# Patient Record
Sex: FEMALE | Race: WHITE | Employment: OTHER | URBAN - METROPOLITAN AREA
[De-identification: names, ages, dates, MRNs, and addresses within clinical notes are randomized per-mention and may not be internally consistent; named-entity substitution may affect disease eponyms.]

---

## 2017-01-06 DIAGNOSIS — E55.9 VITAMIN D DEFICIENCY: ICD-10-CM

## 2017-01-06 DIAGNOSIS — E78.00 PURE HYPERCHOLESTEROLEMIA: ICD-10-CM

## 2017-01-06 LAB — LDL CHOLESTEROL DIRECT: 114 MG/DL

## 2017-01-10 ENCOUNTER — TELEPHONE (OUTPATIENT)
Dept: FAMILY MEDICINE CLINIC | Age: 67
End: 2017-01-10

## 2017-01-10 DIAGNOSIS — E55.9 VITAMIN D DEFICIENCY: Primary | ICD-10-CM

## 2017-01-10 DIAGNOSIS — E78.00 PURE HYPERCHOLESTEROLEMIA: ICD-10-CM

## 2017-01-12 ENCOUNTER — OFFICE VISIT (OUTPATIENT)
Dept: FAMILY MEDICINE CLINIC | Age: 67
End: 2017-01-12

## 2017-01-12 VITALS
HEART RATE: 60 BPM | BODY MASS INDEX: 33.72 KG/M2 | SYSTOLIC BLOOD PRESSURE: 114 MMHG | WEIGHT: 183.25 LBS | HEIGHT: 62 IN | DIASTOLIC BLOOD PRESSURE: 56 MMHG | RESPIRATION RATE: 16 BRPM

## 2017-01-12 DIAGNOSIS — Z12.11 SCREEN FOR COLON CANCER: ICD-10-CM

## 2017-01-12 DIAGNOSIS — M25.50 ARTHRALGIA, UNSPECIFIED JOINT: ICD-10-CM

## 2017-01-12 DIAGNOSIS — Z00.00 WELL ADULT EXAM: Primary | ICD-10-CM

## 2017-01-12 LAB
HEMOCCULT STL QL: NORMAL

## 2017-01-12 PROCEDURE — 99396 PREV VISIT EST AGE 40-64: CPT | Performed by: FAMILY MEDICINE

## 2017-01-12 PROCEDURE — 82270 OCCULT BLOOD FECES: CPT | Performed by: FAMILY MEDICINE

## 2017-01-12 RX ORDER — MELOXICAM 15 MG/1
15 TABLET ORAL DAILY
Qty: 30 TABLET | Refills: 11 | Status: SHIPPED | OUTPATIENT
Start: 2017-01-12 | End: 2017-07-12

## 2017-01-12 ASSESSMENT — ENCOUNTER SYMPTOMS
CONSTIPATION: 0
SHORTNESS OF BREATH: 0
SINUS PRESSURE: 0

## 2017-03-27 ENCOUNTER — TELEPHONE (OUTPATIENT)
Dept: FAMILY MEDICINE CLINIC | Age: 67
End: 2017-03-27

## 2017-06-14 RX ORDER — LISINOPRIL 20 MG/1
TABLET ORAL
Qty: 90 TABLET | Refills: 3 | Status: SHIPPED | OUTPATIENT
Start: 2017-06-14 | End: 2018-09-10 | Stop reason: SDUPTHER

## 2017-07-05 ENCOUNTER — TELEPHONE (OUTPATIENT)
Dept: FAMILY MEDICINE CLINIC | Age: 67
End: 2017-07-05

## 2017-07-08 LAB
CHOLESTEROL, TOTAL: NORMAL MG/DL
CHOLESTEROL/HDL RATIO: NORMAL
HDLC SERPL-MCNC: NORMAL MG/DL (ref 35–70)
LDL CHOLESTEROL CALCULATED: 104 MG/DL (ref 0–160)
TRIGL SERPL-MCNC: NORMAL MG/DL
VLDLC SERPL CALC-MCNC: NORMAL MG/DL

## 2017-07-10 DIAGNOSIS — E55.9 VITAMIN D DEFICIENCY: ICD-10-CM

## 2017-07-10 DIAGNOSIS — E78.00 PURE HYPERCHOLESTEROLEMIA: ICD-10-CM

## 2017-07-11 DIAGNOSIS — E78.00 PURE HYPERCHOLESTEROLEMIA: ICD-10-CM

## 2017-07-11 DIAGNOSIS — E55.9 VITAMIN D DEFICIENCY: Primary | ICD-10-CM

## 2017-07-12 ENCOUNTER — TELEPHONE (OUTPATIENT)
Dept: FAMILY MEDICINE CLINIC | Age: 67
End: 2017-07-12

## 2017-07-12 ENCOUNTER — OFFICE VISIT (OUTPATIENT)
Dept: FAMILY MEDICINE CLINIC | Age: 67
End: 2017-07-12

## 2017-07-12 VITALS
HEIGHT: 62 IN | RESPIRATION RATE: 14 BRPM | HEART RATE: 68 BPM | WEIGHT: 186.25 LBS | SYSTOLIC BLOOD PRESSURE: 106 MMHG | BODY MASS INDEX: 34.27 KG/M2 | DIASTOLIC BLOOD PRESSURE: 64 MMHG

## 2017-07-12 DIAGNOSIS — I10 ESSENTIAL HYPERTENSION: ICD-10-CM

## 2017-07-12 DIAGNOSIS — E78.00 PURE HYPERCHOLESTEROLEMIA: Primary | ICD-10-CM

## 2017-07-12 PROCEDURE — 99213 OFFICE O/P EST LOW 20 MIN: CPT | Performed by: FAMILY MEDICINE

## 2017-07-12 RX ORDER — SIMVASTATIN 20 MG
TABLET ORAL
Qty: 90 TABLET | Refills: 3 | Status: SHIPPED | OUTPATIENT
Start: 2017-07-12 | End: 2018-07-05 | Stop reason: SDUPTHER

## 2017-07-12 ASSESSMENT — ENCOUNTER SYMPTOMS
BACK PAIN: 1
CONSTIPATION: 0
SINUS PRESSURE: 0
SHORTNESS OF BREATH: 0

## 2018-01-26 DIAGNOSIS — E78.00 PURE HYPERCHOLESTEROLEMIA: ICD-10-CM

## 2018-01-26 DIAGNOSIS — E55.9 VITAMIN D DEFICIENCY: ICD-10-CM

## 2018-01-26 LAB — LDL CHOLESTEROL DIRECT: 93 MG/DL

## 2018-01-27 DIAGNOSIS — E78.00 PURE HYPERCHOLESTEROLEMIA: Primary | ICD-10-CM

## 2018-01-27 DIAGNOSIS — E55.9 VITAMIN D DEFICIENCY: ICD-10-CM

## 2018-01-29 ENCOUNTER — TELEPHONE (OUTPATIENT)
Dept: FAMILY MEDICINE CLINIC | Age: 68
End: 2018-01-29

## 2018-02-02 ENCOUNTER — OFFICE VISIT (OUTPATIENT)
Dept: FAMILY MEDICINE CLINIC | Age: 68
End: 2018-02-02

## 2018-02-02 VITALS
HEART RATE: 72 BPM | RESPIRATION RATE: 16 BRPM | WEIGHT: 187 LBS | DIASTOLIC BLOOD PRESSURE: 68 MMHG | HEIGHT: 62 IN | SYSTOLIC BLOOD PRESSURE: 130 MMHG | BODY MASS INDEX: 34.41 KG/M2

## 2018-02-02 DIAGNOSIS — M48.062 SPINAL STENOSIS OF LUMBAR REGION WITH NEUROGENIC CLAUDICATION: Primary | ICD-10-CM

## 2018-02-02 PROCEDURE — 99213 OFFICE O/P EST LOW 20 MIN: CPT | Performed by: FAMILY MEDICINE

## 2018-02-02 RX ORDER — FAMOTIDINE 10 MG
10 TABLET ORAL DAILY PRN
Status: ON HOLD | COMMUNITY
End: 2019-02-04 | Stop reason: ALTCHOICE

## 2018-02-02 ASSESSMENT — ENCOUNTER SYMPTOMS
SINUS PRESSURE: 0
BACK PAIN: 1
SHORTNESS OF BREATH: 0
CONSTIPATION: 0

## 2018-02-06 ENCOUNTER — HOSPITAL ENCOUNTER (OUTPATIENT)
Age: 68
Discharge: HOME | End: 2018-02-06
Payer: MEDICARE

## 2018-02-06 ENCOUNTER — HOSPITAL ENCOUNTER (OUTPATIENT)
Dept: GENERAL RADIOLOGY | Age: 68
Discharge: HOME | End: 2018-02-06
Payer: MEDICARE

## 2018-02-06 DIAGNOSIS — M48.062 SPINAL STENOSIS OF LUMBAR REGION WITH NEUROGENIC CLAUDICATION: ICD-10-CM

## 2018-02-06 PROCEDURE — 72110 X-RAY EXAM L-2 SPINE 4/>VWS: CPT

## 2018-02-19 ENCOUNTER — TELEPHONE (OUTPATIENT)
Dept: FAMILY MEDICINE CLINIC | Age: 68
End: 2018-02-19

## 2018-04-09 ENCOUNTER — TELEPHONE (OUTPATIENT)
Dept: FAMILY MEDICINE CLINIC | Age: 68
End: 2018-04-09

## 2018-04-09 DIAGNOSIS — M48.062 SPINAL STENOSIS OF LUMBAR REGION WITH NEUROGENIC CLAUDICATION: Primary | ICD-10-CM

## 2018-04-09 DIAGNOSIS — I10 ESSENTIAL HYPERTENSION: Primary | ICD-10-CM

## 2018-04-10 ENCOUNTER — HOSPITAL ENCOUNTER (OUTPATIENT)
Age: 68
Discharge: HOME | End: 2018-04-10
Payer: MEDICARE

## 2018-04-10 DIAGNOSIS — I10 ESSENTIAL HYPERTENSION: ICD-10-CM

## 2018-04-10 LAB
BUN BLDV-MCNC: 19 MG/DL (ref 7–22)
CREAT SERPL-MCNC: 0.8 MG/DL (ref 0.4–1.2)
GFR SERPL CREATININE-BSD FRML MDRD: 72 ML/MIN/1.73M2

## 2018-04-10 PROCEDURE — 36415 COLL VENOUS BLD VENIPUNCTURE: CPT

## 2018-04-10 PROCEDURE — 82565 ASSAY OF CREATININE: CPT

## 2018-04-10 PROCEDURE — 84520 ASSAY OF UREA NITROGEN: CPT

## 2018-04-13 ENCOUNTER — TELEPHONE (OUTPATIENT)
Dept: FAMILY MEDICINE CLINIC | Age: 68
End: 2018-04-13

## 2018-04-13 ENCOUNTER — HOSPITAL ENCOUNTER (OUTPATIENT)
Dept: MRI IMAGING | Age: 68
Discharge: HOME | End: 2018-04-13
Payer: MEDICARE

## 2018-04-13 DIAGNOSIS — F41.9 ANXIETY: Primary | ICD-10-CM

## 2018-04-13 DIAGNOSIS — M48.062 SPINAL STENOSIS OF LUMBAR REGION WITH NEUROGENIC CLAUDICATION: ICD-10-CM

## 2018-04-13 PROCEDURE — 72148 MRI LUMBAR SPINE W/O DYE: CPT

## 2018-04-13 RX ORDER — ALPRAZOLAM 1 MG/1
1 TABLET ORAL ONCE
Qty: 1 TABLET | Refills: 0 | Status: SHIPPED | OUTPATIENT
Start: 2018-04-13 | End: 2018-04-13

## 2018-04-17 ENCOUNTER — TELEPHONE (OUTPATIENT)
Dept: FAMILY MEDICINE CLINIC | Age: 68
End: 2018-04-17

## 2018-07-05 DIAGNOSIS — I10 ESSENTIAL HYPERTENSION: ICD-10-CM

## 2018-07-05 DIAGNOSIS — E78.00 PURE HYPERCHOLESTEROLEMIA: ICD-10-CM

## 2018-07-05 RX ORDER — SIMVASTATIN 20 MG
TABLET ORAL
Qty: 90 TABLET | Refills: 0 | Status: SHIPPED | OUTPATIENT
Start: 2018-07-05 | End: 2018-10-07 | Stop reason: SDUPTHER

## 2018-09-10 ENCOUNTER — TELEPHONE (OUTPATIENT)
Dept: FAMILY MEDICINE CLINIC | Age: 68
End: 2018-09-10

## 2018-09-10 DIAGNOSIS — I10 ESSENTIAL HYPERTENSION: ICD-10-CM

## 2018-09-10 DIAGNOSIS — E55.9 VITAMIN D DEFICIENCY: Primary | ICD-10-CM

## 2018-09-10 RX ORDER — LISINOPRIL 20 MG/1
TABLET ORAL
Qty: 90 TABLET | Refills: 0 | Status: SHIPPED | OUTPATIENT
Start: 2018-09-10 | End: 2018-10-09 | Stop reason: SDUPTHER

## 2018-09-10 NOTE — TELEPHONE ENCOUNTER
Pt called stating that she was going to get labs done but they have . She is asking for them to be reordered and faxed for her to get them done.      Fax to [facility] on [address] also needing a refill of:     lisinopril (PRINIVIL;ZESTRIL) 20 MG tablet    Send 90 day supply to [facility] on [address]

## 2018-09-12 DIAGNOSIS — I10 ESSENTIAL HYPERTENSION: ICD-10-CM

## 2018-09-12 DIAGNOSIS — E55.9 VITAMIN D DEFICIENCY: ICD-10-CM

## 2018-09-12 LAB
CHOLESTEROL, TOTAL: NORMAL MG/DL
CHOLESTEROL/HDL RATIO: NORMAL
HDLC SERPL-MCNC: NORMAL MG/DL (ref 35–70)
LDL CHOLESTEROL CALCULATED: 100 MG/DL (ref 0–160)
TRIGL SERPL-MCNC: NORMAL MG/DL
VLDLC SERPL CALC-MCNC: NORMAL MG/DL

## 2018-10-07 DIAGNOSIS — I10 ESSENTIAL HYPERTENSION: ICD-10-CM

## 2018-10-07 DIAGNOSIS — E78.00 PURE HYPERCHOLESTEROLEMIA: ICD-10-CM

## 2018-10-08 RX ORDER — SIMVASTATIN 20 MG
TABLET ORAL
Qty: 90 TABLET | Refills: 3 | Status: SHIPPED | OUTPATIENT
Start: 2018-10-08 | End: 2018-10-09 | Stop reason: SDUPTHER

## 2018-10-09 ENCOUNTER — OFFICE VISIT (OUTPATIENT)
Dept: FAMILY MEDICINE CLINIC | Age: 68
End: 2018-10-09

## 2018-10-09 VITALS
BODY MASS INDEX: 33.51 KG/M2 | DIASTOLIC BLOOD PRESSURE: 62 MMHG | WEIGHT: 182.13 LBS | HEART RATE: 64 BPM | SYSTOLIC BLOOD PRESSURE: 120 MMHG | HEIGHT: 62 IN | RESPIRATION RATE: 13 BRPM

## 2018-10-09 DIAGNOSIS — G89.29 CHRONIC MIDLINE LOW BACK PAIN WITHOUT SCIATICA: Primary | ICD-10-CM

## 2018-10-09 DIAGNOSIS — F33.41 RECURRENT MAJOR DEPRESSIVE DISORDER, IN PARTIAL REMISSION (HCC): ICD-10-CM

## 2018-10-09 DIAGNOSIS — E78.00 PURE HYPERCHOLESTEROLEMIA: ICD-10-CM

## 2018-10-09 DIAGNOSIS — E55.9 VITAMIN D DEFICIENCY: ICD-10-CM

## 2018-10-09 DIAGNOSIS — M54.50 CHRONIC MIDLINE LOW BACK PAIN WITHOUT SCIATICA: Primary | ICD-10-CM

## 2018-10-09 DIAGNOSIS — I10 ESSENTIAL HYPERTENSION: ICD-10-CM

## 2018-10-09 PROCEDURE — 99214 OFFICE O/P EST MOD 30 MIN: CPT | Performed by: FAMILY MEDICINE

## 2018-10-09 PROCEDURE — 1101F PT FALLS ASSESS-DOCD LE1/YR: CPT | Performed by: FAMILY MEDICINE

## 2018-10-09 RX ORDER — AMLODIPINE BESYLATE 2.5 MG/1
2.5 TABLET ORAL DAILY
Qty: 90 TABLET | Refills: 3 | Status: SHIPPED | OUTPATIENT
Start: 2018-10-09 | End: 2021-12-09 | Stop reason: SDUPTHER

## 2018-10-09 RX ORDER — LISINOPRIL 20 MG/1
TABLET ORAL
Qty: 90 TABLET | Refills: 3 | Status: SHIPPED | OUTPATIENT
Start: 2018-10-09 | End: 2019-10-28 | Stop reason: SDUPTHER

## 2018-10-09 RX ORDER — SIMVASTATIN 20 MG
TABLET ORAL
Qty: 90 TABLET | Refills: 3 | Status: SHIPPED | OUTPATIENT
Start: 2018-10-09 | End: 2019-12-13 | Stop reason: SDUPTHER

## 2018-10-09 ASSESSMENT — PATIENT HEALTH QUESTIONNAIRE - PHQ9
SUM OF ALL RESPONSES TO PHQ QUESTIONS 1-9: 0
SUM OF ALL RESPONSES TO PHQ QUESTIONS 1-9: 0
1. LITTLE INTEREST OR PLEASURE IN DOING THINGS: 0
2. FEELING DOWN, DEPRESSED OR HOPELESS: 0
SUM OF ALL RESPONSES TO PHQ9 QUESTIONS 1 & 2: 0

## 2018-10-09 ASSESSMENT — ENCOUNTER SYMPTOMS
SHORTNESS OF BREATH: 0
SINUS PRESSURE: 0
CONSTIPATION: 0

## 2018-11-27 ENCOUNTER — OFFICE VISIT (OUTPATIENT)
Dept: PHYSICAL MEDICINE AND REHAB | Age: 68
End: 2018-11-27
Payer: MEDICARE

## 2018-11-27 VITALS
SYSTOLIC BLOOD PRESSURE: 134 MMHG | HEIGHT: 62 IN | WEIGHT: 192.8 LBS | DIASTOLIC BLOOD PRESSURE: 82 MMHG | HEART RATE: 72 BPM | BODY MASS INDEX: 35.48 KG/M2

## 2018-11-27 DIAGNOSIS — G89.4 CHRONIC PAIN SYNDROME: ICD-10-CM

## 2018-11-27 DIAGNOSIS — M46.1 SI (SACROILIAC) JOINT INFLAMMATION (HCC): ICD-10-CM

## 2018-11-27 DIAGNOSIS — M47.816 SPONDYLOSIS OF LUMBAR REGION WITHOUT MYELOPATHY OR RADICULOPATHY: Primary | ICD-10-CM

## 2018-11-27 PROCEDURE — G8484 FLU IMMUNIZE NO ADMIN: HCPCS | Performed by: NURSE PRACTITIONER

## 2018-11-27 PROCEDURE — 1036F TOBACCO NON-USER: CPT | Performed by: NURSE PRACTITIONER

## 2018-11-27 PROCEDURE — 1101F PT FALLS ASSESS-DOCD LE1/YR: CPT | Performed by: NURSE PRACTITIONER

## 2018-11-27 PROCEDURE — G8427 DOCREV CUR MEDS BY ELIG CLIN: HCPCS | Performed by: NURSE PRACTITIONER

## 2018-11-27 PROCEDURE — 1123F ACP DISCUSS/DSCN MKR DOCD: CPT | Performed by: NURSE PRACTITIONER

## 2018-11-27 PROCEDURE — 99205 OFFICE O/P NEW HI 60 MIN: CPT | Performed by: NURSE PRACTITIONER

## 2018-11-27 PROCEDURE — 1090F PRES/ABSN URINE INCON ASSESS: CPT | Performed by: NURSE PRACTITIONER

## 2018-11-27 PROCEDURE — G8400 PT W/DXA NO RESULTS DOC: HCPCS | Performed by: NURSE PRACTITIONER

## 2018-11-27 PROCEDURE — G8417 CALC BMI ABV UP PARAM F/U: HCPCS | Performed by: NURSE PRACTITIONER

## 2018-11-27 PROCEDURE — 3017F COLORECTAL CA SCREEN DOC REV: CPT | Performed by: NURSE PRACTITIONER

## 2018-11-27 PROCEDURE — 4040F PNEUMOC VAC/ADMIN/RCVD: CPT | Performed by: NURSE PRACTITIONER

## 2018-11-27 RX ORDER — HYDROCODONE BITARTRATE AND ACETAMINOPHEN 5; 325 MG/1; MG/1
1 TABLET ORAL 2 TIMES DAILY PRN
Qty: 28 TABLET | Refills: 0 | Status: SHIPPED | OUTPATIENT
Start: 2018-11-27 | End: 2018-12-11

## 2018-11-27 ASSESSMENT — ENCOUNTER SYMPTOMS
SORE THROAT: 0
DIARRHEA: 0
EYE PAIN: 0
CONSTIPATION: 0
WHEEZING: 0
CHEST TIGHTNESS: 0
VOMITING: 0
COUGH: 0
COLOR CHANGE: 0
SHORTNESS OF BREATH: 0
NAUSEA: 0
SINUS PRESSURE: 0
PHOTOPHOBIA: 0
ABDOMINAL PAIN: 0
BACK PAIN: 1
RHINORRHEA: 0

## 2018-11-27 NOTE — PROGRESS NOTES
ChiefComplaint: Low back pain SI pain    HPI  New patient here today with c/o low back pain for the last 7 years. Denies any trauma fall or MVA. She worked at [employer] for 21 years and stands all shift. She has never and lumbar spine surgery. She has seen Dr Hieu Gomez. She has went to [facility] pain clinic in the past and has had Bilateral Lumbar RFA L4-5,5-S1 in 2014 with great relief for about 6-12 months. She also had SI  MBB and Caudal epidurals with some relief. Describes the low back pain as constant sharp aching pain increases to stabbing pains with standing, walking. The pain is 90% back, rarely she has a burning sensation into her thighs and tailbone. The pain radiates into her tailbone and SI also. She has morning stiffness and c/o muscle spasms. Rates pain at worse 9/10, best 3/10, average 6/10. Treatments tried Lumbar RFA in 2014, Caudal Epidural SI injection, PT, home therapy, ice, heat, Chiropractor, TENS Unit, binder, OTC Advil, Neurontin, NSAID,Tramdaol, Tylenol, OTC rubs patches, creams, compound cream.  She denies h/o cancer or long term steroid use. She denies any bowel or bladder dysfunction. Lumbar MRI reviewed 2018  FINDINGS:           There is 5 mm of anterolisthesis of L4 on L5. This is new. There are degenerative marrow changes in the endplates at the V2-6 level. There is associated edema on the right.  There are no compression fractures.  No pars defects are noted.  There is disc    desiccation throughout.       The distal spinal cord, conus medullaris and cauda equina are normal.        There are mild degenerative changes in the lower thoracic spine.       On the axial images, at L1-L2, there are mild facet degenerative changes. There is no spinal canal stenosis. There is mild bilateral foraminal stenosis.       At L2-L3, there are facet degenerative changes. There is thickening of the ligamentum flavum. There is a diffuse disc bulge. There is mild spinal canal stenosis.  There is moderate severity right foraminal stenosis. There is mild left foraminal stenosis.       At L3-L4, there are mild facet degenerative changes. There is thickening of ligamentum flavum. There is mild spinal canal stenosis. There is no significant foraminal stenosis.       At L4-L5, there are severe left-sided facet degenerative changes. There are moderate severity right-sided facet degenerative changes. There is a diffuse disc bulge. There is moderate severity spinal canal stenosis. There is moderate to severe left    foraminal stenosis.       At L5-S1, there is no spinal canal stenosis. There is mild left foraminal stenosis.       There are no suspicious findings in the visualized aspects of the retroperitoneum and paraspinal soft tissues.           Impression       1. Moderate to severe left foraminal stenosis with moderate severity spinal canal stenosis at the L4-5 level. There is also 5 mm of anterolisthesis at this level. 2. Mild spinal canal stenosis at the L3-4 level. 3. Mild spinal canal stenosis with moderate severity right and mild left foraminal stenosis at the L2-3 level. Lumbar XR reviewed 2/6/2018  1. Mild thoracolumbar dextro scoliosis. Mild lumbar levoscoliosis. Partial sacralization of L5 with bilateral pseudoarthroses. Marked lateral disc space narrowing L2-3 right side. 2. Mild antegrade spondylolisthesis L4 upon L5, 5 mm. Marked disc space narrowing and degenerative disc disease at this level. 3. No evidence for spondylolysis. No fractures seen. Mild degenerative spondylosis scattered throughout the lumbar spine. Moderate degenerative facet arthropathy L2-3 right side, L3-4 and L4-5 bilaterally. 4. Overall appearance of lumbar spine has worsened since prior study.                     The patient is allergic to motrin [ibuprofen] and pcn [penicillins]. Lv Mast  has a past medical history of Adenomatous colon polyp;  Chronic gastritis; GERD (gastroesophageal reflux 2016  · NSAIDs: Yes,  any benefit? Yes,  how long taken:months   · Chiropractic: Yes,  any benefit? No  · Muscle relaxants: Yes,  any benefit? Yes  · Narcotics: Yes,  any benefit? Yes  · Spine surgeon consult: Yes Dr Yara Castano  · Any Implants: No    Meds. Prescribed:   Orders Placed This Encounter   Medications    HYDROcodone-acetaminophen (NORCO) 5-325 MG per tablet     Sig: Take 1 tablet by mouth 2 times daily as needed for Pain for up to 14 days. .     Dispense:  28 tablet     Refill:  0       Return for Lumbar Facet [email or web address] Bilateral, Follow up after procedure, Follow up pain medications. Time spent with patient was 60  minutes more than 50% was spent  Counseling/coordinated the patient'scare.     Electronically signed by SHERRY Hagan CNP on 11/27/2018 at 8:55 AM

## 2018-12-13 ENCOUNTER — TELEPHONE (OUTPATIENT)
Dept: OPERATING ROOM | Age: 68
End: 2018-12-13

## 2018-12-27 ENCOUNTER — TELEPHONE (OUTPATIENT)
Dept: OPERATING ROOM | Age: 68
End: 2018-12-27

## 2018-12-31 DIAGNOSIS — G89.4 CHRONIC PAIN SYNDROME: Primary | ICD-10-CM

## 2018-12-31 RX ORDER — HYDROCODONE BITARTRATE AND ACETAMINOPHEN 5; 325 MG/1; MG/1
1 TABLET ORAL 2 TIMES DAILY
Qty: 60 TABLET | Refills: 0 | Status: SHIPPED | OUTPATIENT
Start: 2018-12-31 | End: 2019-01-30

## 2018-12-31 NOTE — TELEPHONE ENCOUNTER
OARRS reviewed. UDS: screen on 11/27/2018, no results (new patient). Last seen: 11/27/2018 as a new patient. Follow-up: procedure scheduled for 12/18/2018 was cancelled, scheduled for 01/28/2019 and was also cancelled. Please advise.

## 2019-01-17 ENCOUNTER — PREP FOR PROCEDURE (OUTPATIENT)
Dept: PHYSICAL MEDICINE AND REHAB | Age: 69
End: 2019-01-17

## 2019-02-04 ENCOUNTER — ANESTHESIA (OUTPATIENT)
Dept: OPERATING ROOM | Age: 69
End: 2019-02-04
Payer: MEDICARE

## 2019-02-04 ENCOUNTER — APPOINTMENT (OUTPATIENT)
Dept: GENERAL RADIOLOGY | Age: 69
End: 2019-02-04
Attending: PAIN MEDICINE
Payer: MEDICARE

## 2019-02-04 ENCOUNTER — ANESTHESIA EVENT (OUTPATIENT)
Dept: OPERATING ROOM | Age: 69
End: 2019-02-04
Payer: MEDICARE

## 2019-02-04 ENCOUNTER — HOSPITAL ENCOUNTER (OUTPATIENT)
Age: 69
Setting detail: OUTPATIENT SURGERY
Discharge: HOME | End: 2019-02-04
Attending: PAIN MEDICINE | Admitting: PAIN MEDICINE
Payer: MEDICARE

## 2019-02-04 VITALS
SYSTOLIC BLOOD PRESSURE: 98 MMHG | OXYGEN SATURATION: 99 % | DIASTOLIC BLOOD PRESSURE: 54 MMHG | RESPIRATION RATE: 16 BRPM

## 2019-02-04 VITALS
WEIGHT: 186.4 LBS | OXYGEN SATURATION: 95 % | RESPIRATION RATE: 16 BRPM | BODY MASS INDEX: 34.3 KG/M2 | TEMPERATURE: 97.5 F | SYSTOLIC BLOOD PRESSURE: 116 MMHG | HEART RATE: 79 BPM | DIASTOLIC BLOOD PRESSURE: 71 MMHG | HEIGHT: 62 IN

## 2019-02-04 PROCEDURE — 7100000011 HC PHASE II RECOVERY - ADDTL 15 MIN: Performed by: PAIN MEDICINE

## 2019-02-04 PROCEDURE — 3700000000 HC ANESTHESIA ATTENDED CARE: Performed by: PAIN MEDICINE

## 2019-02-04 PROCEDURE — 2500000003 HC RX 250 WO HCPCS: Performed by: NURSE ANESTHETIST, CERTIFIED REGISTERED

## 2019-02-04 PROCEDURE — 64494 INJ PARAVERT F JNT L/S 2 LEV: CPT | Performed by: PAIN MEDICINE

## 2019-02-04 PROCEDURE — 6360000002 HC RX W HCPCS: Performed by: PAIN MEDICINE

## 2019-02-04 PROCEDURE — 3600000054 HC PAIN LEVEL 3 BASE: Performed by: PAIN MEDICINE

## 2019-02-04 PROCEDURE — 3209999900 FLUORO FOR SURGICAL PROCEDURES

## 2019-02-04 PROCEDURE — 6360000002 HC RX W HCPCS: Performed by: NURSE ANESTHETIST, CERTIFIED REGISTERED

## 2019-02-04 PROCEDURE — 2500000003 HC RX 250 WO HCPCS: Performed by: PAIN MEDICINE

## 2019-02-04 PROCEDURE — 64493 INJ PARAVERT F JNT L/S 1 LEV: CPT | Performed by: PAIN MEDICINE

## 2019-02-04 PROCEDURE — 64495 INJ PARAVERT F JNT L/S 3 LEV: CPT | Performed by: PAIN MEDICINE

## 2019-02-04 PROCEDURE — 2709999900 HC NON-CHARGEABLE SUPPLY: Performed by: PAIN MEDICINE

## 2019-02-04 PROCEDURE — 7100000010 HC PHASE II RECOVERY - FIRST 15 MIN: Performed by: PAIN MEDICINE

## 2019-02-04 RX ORDER — LIDOCAINE HYDROCHLORIDE 10 MG/ML
INJECTION, SOLUTION INFILTRATION; PERINEURAL PRN
Status: DISCONTINUED | OUTPATIENT
Start: 2019-02-04 | End: 2019-02-04 | Stop reason: SDUPTHER

## 2019-02-04 RX ORDER — PROPOFOL 10 MG/ML
INJECTION, EMULSION INTRAVENOUS PRN
Status: DISCONTINUED | OUTPATIENT
Start: 2019-02-04 | End: 2019-02-04 | Stop reason: SDUPTHER

## 2019-02-04 RX ORDER — ROPIVACAINE HYDROCHLORIDE 2 MG/ML
INJECTION, SOLUTION EPIDURAL; INFILTRATION; PERINEURAL PRN
Status: DISCONTINUED | OUTPATIENT
Start: 2019-02-04 | End: 2019-02-04 | Stop reason: HOSPADM

## 2019-02-04 RX ORDER — BETAMETHASONE SODIUM PHOSPHATE AND BETAMETHASONE ACETATE 3; 3 MG/ML; MG/ML
INJECTION, SUSPENSION INTRA-ARTICULAR; INTRALESIONAL; INTRAMUSCULAR; SOFT TISSUE PRN
Status: DISCONTINUED | OUTPATIENT
Start: 2019-02-04 | End: 2019-02-04 | Stop reason: HOSPADM

## 2019-02-04 RX ORDER — PANTOPRAZOLE SODIUM 40 MG/1
40 GRANULE, DELAYED RELEASE ORAL
COMMUNITY
End: 2019-08-27

## 2019-02-04 RX ORDER — LIDOCAINE HYDROCHLORIDE 10 MG/ML
INJECTION, SOLUTION INFILTRATION; PERINEURAL PRN
Status: DISCONTINUED | OUTPATIENT
Start: 2019-02-04 | End: 2019-02-04 | Stop reason: HOSPADM

## 2019-02-04 RX ADMIN — PROPOFOL 50 MG: 10 INJECTION, EMULSION INTRAVENOUS at 10:29

## 2019-02-04 RX ADMIN — LIDOCAINE HYDROCHLORIDE 20 MG: 10 INJECTION, SOLUTION INFILTRATION; PERINEURAL at 10:29

## 2019-02-04 RX ADMIN — PROPOFOL 50 MG: 10 INJECTION, EMULSION INTRAVENOUS at 10:31

## 2019-02-04 ASSESSMENT — PULMONARY FUNCTION TESTS
PIF_VALUE: 0

## 2019-02-04 ASSESSMENT — PAIN - FUNCTIONAL ASSESSMENT: PAIN_FUNCTIONAL_ASSESSMENT: 0-10

## 2019-02-04 ASSESSMENT — PAIN DESCRIPTION - DESCRIPTORS: DESCRIPTORS: SHARP;CONSTANT;SORE

## 2019-02-04 ASSESSMENT — PAIN SCALES - GENERAL: PAINLEVEL_OUTOF10: 0

## 2019-02-08 ENCOUNTER — OFFICE VISIT (OUTPATIENT)
Dept: PHYSICAL MEDICINE AND REHAB | Age: 69
End: 2019-02-08
Payer: MEDICARE

## 2019-02-08 VITALS
HEIGHT: 62 IN | BODY MASS INDEX: 34.63 KG/M2 | SYSTOLIC BLOOD PRESSURE: 138 MMHG | DIASTOLIC BLOOD PRESSURE: 73 MMHG | HEART RATE: 70 BPM | WEIGHT: 188.2 LBS

## 2019-02-08 DIAGNOSIS — M47.816 SPONDYLOSIS OF LUMBAR REGION WITHOUT MYELOPATHY OR RADICULOPATHY: Primary | ICD-10-CM

## 2019-02-08 DIAGNOSIS — M46.1 SI (SACROILIAC) JOINT INFLAMMATION (HCC): ICD-10-CM

## 2019-02-08 DIAGNOSIS — G89.4 CHRONIC PAIN SYNDROME: ICD-10-CM

## 2019-02-08 PROCEDURE — 99213 OFFICE O/P EST LOW 20 MIN: CPT | Performed by: NURSE PRACTITIONER

## 2019-02-08 ASSESSMENT — ENCOUNTER SYMPTOMS
COLOR CHANGE: 0
BACK PAIN: 1

## 2019-03-11 ENCOUNTER — PREP FOR PROCEDURE (OUTPATIENT)
Dept: PHYSICAL MEDICINE AND REHAB | Age: 69
End: 2019-03-11

## 2019-03-25 ENCOUNTER — ANESTHESIA (OUTPATIENT)
Dept: OPERATING ROOM | Age: 69
End: 2019-03-25
Payer: MEDICARE

## 2019-03-25 ENCOUNTER — TELEPHONE (OUTPATIENT)
Dept: FAMILY MEDICINE CLINIC | Age: 69
End: 2019-03-25

## 2019-03-25 ENCOUNTER — HOSPITAL ENCOUNTER (OUTPATIENT)
Age: 69
Setting detail: OUTPATIENT SURGERY
Discharge: HOME | End: 2019-03-25
Attending: PAIN MEDICINE | Admitting: PAIN MEDICINE
Payer: MEDICARE

## 2019-03-25 ENCOUNTER — ANESTHESIA EVENT (OUTPATIENT)
Dept: OPERATING ROOM | Age: 69
End: 2019-03-25
Payer: MEDICARE

## 2019-03-25 ENCOUNTER — HOSPITAL ENCOUNTER (OUTPATIENT)
Dept: GENERAL RADIOLOGY | Age: 69
Setting detail: OUTPATIENT SURGERY
Discharge: HOME | End: 2019-03-25
Attending: PAIN MEDICINE
Payer: MEDICARE

## 2019-03-25 VITALS
DIASTOLIC BLOOD PRESSURE: 51 MMHG | SYSTOLIC BLOOD PRESSURE: 108 MMHG | RESPIRATION RATE: 15 BRPM | OXYGEN SATURATION: 96 %

## 2019-03-25 VITALS
RESPIRATION RATE: 16 BRPM | HEIGHT: 62 IN | HEART RATE: 80 BPM | TEMPERATURE: 97.6 F | BODY MASS INDEX: 34.41 KG/M2 | OXYGEN SATURATION: 94 % | WEIGHT: 187 LBS | DIASTOLIC BLOOD PRESSURE: 59 MMHG | SYSTOLIC BLOOD PRESSURE: 102 MMHG

## 2019-03-25 DIAGNOSIS — F32.A ANXIETY AND DEPRESSION: ICD-10-CM

## 2019-03-25 DIAGNOSIS — F41.9 ANXIETY AND DEPRESSION: ICD-10-CM

## 2019-03-25 DIAGNOSIS — R52 PAIN: ICD-10-CM

## 2019-03-25 PROCEDURE — 2500000003 HC RX 250 WO HCPCS: Performed by: PAIN MEDICINE

## 2019-03-25 PROCEDURE — 3209999900 FLUORO FOR SURGICAL PROCEDURES

## 2019-03-25 PROCEDURE — 64493 INJ PARAVERT F JNT L/S 1 LEV: CPT | Performed by: PAIN MEDICINE

## 2019-03-25 PROCEDURE — 3600000056 HC PAIN LEVEL 4 BASE: Performed by: PAIN MEDICINE

## 2019-03-25 PROCEDURE — 7100000011 HC PHASE II RECOVERY - ADDTL 15 MIN: Performed by: PAIN MEDICINE

## 2019-03-25 PROCEDURE — 7100000010 HC PHASE II RECOVERY - FIRST 15 MIN: Performed by: PAIN MEDICINE

## 2019-03-25 PROCEDURE — 6360000002 HC RX W HCPCS: Performed by: NURSE ANESTHETIST, CERTIFIED REGISTERED

## 2019-03-25 PROCEDURE — 64495 INJ PARAVERT F JNT L/S 3 LEV: CPT | Performed by: PAIN MEDICINE

## 2019-03-25 PROCEDURE — 3700000000 HC ANESTHESIA ATTENDED CARE: Performed by: PAIN MEDICINE

## 2019-03-25 PROCEDURE — 64494 INJ PARAVERT F JNT L/S 2 LEV: CPT | Performed by: PAIN MEDICINE

## 2019-03-25 PROCEDURE — 2709999900 HC NON-CHARGEABLE SUPPLY: Performed by: PAIN MEDICINE

## 2019-03-25 PROCEDURE — 6360000002 HC RX W HCPCS: Performed by: PAIN MEDICINE

## 2019-03-25 PROCEDURE — 2580000003 HC RX 258: Performed by: NURSE ANESTHETIST, CERTIFIED REGISTERED

## 2019-03-25 RX ORDER — PROPOFOL 10 MG/ML
INJECTION, EMULSION INTRAVENOUS PRN
Status: DISCONTINUED | OUTPATIENT
Start: 2019-03-25 | End: 2019-03-25 | Stop reason: SDUPTHER

## 2019-03-25 RX ORDER — ROPIVACAINE HYDROCHLORIDE 2 MG/ML
INJECTION, SOLUTION EPIDURAL; INFILTRATION; PERINEURAL PRN
Status: DISCONTINUED | OUTPATIENT
Start: 2019-03-25 | End: 2019-03-25 | Stop reason: ALTCHOICE

## 2019-03-25 RX ORDER — LIDOCAINE HYDROCHLORIDE 10 MG/ML
INJECTION, SOLUTION INFILTRATION; PERINEURAL PRN
Status: DISCONTINUED | OUTPATIENT
Start: 2019-03-25 | End: 2019-03-25 | Stop reason: ALTCHOICE

## 2019-03-25 RX ORDER — METHYLPREDNISOLONE ACETATE 80 MG/ML
INJECTION, SUSPENSION INTRA-ARTICULAR; INTRALESIONAL; INTRAMUSCULAR; SOFT TISSUE PRN
Status: DISCONTINUED | OUTPATIENT
Start: 2019-03-25 | End: 2019-03-25 | Stop reason: ALTCHOICE

## 2019-03-25 RX ORDER — SODIUM CHLORIDE 9 MG/ML
INJECTION, SOLUTION INTRAVENOUS CONTINUOUS PRN
Status: DISCONTINUED | OUTPATIENT
Start: 2019-03-25 | End: 2019-03-25 | Stop reason: SDUPTHER

## 2019-03-25 RX ORDER — BETAMETHASONE SODIUM PHOSPHATE AND BETAMETHASONE ACETATE 3; 3 MG/ML; MG/ML
INJECTION, SUSPENSION INTRA-ARTICULAR; INTRALESIONAL; INTRAMUSCULAR; SOFT TISSUE PRN
Status: DISCONTINUED | OUTPATIENT
Start: 2019-03-25 | End: 2019-03-25 | Stop reason: ALTCHOICE

## 2019-03-25 RX ADMIN — PROPOFOL 50 MCG/KG/MIN: 10 INJECTION, EMULSION INTRAVENOUS at 10:15

## 2019-03-25 RX ADMIN — PROPOFOL 50 MG: 10 INJECTION, EMULSION INTRAVENOUS at 10:15

## 2019-03-25 RX ADMIN — PROPOFOL 30 MG: 10 INJECTION, EMULSION INTRAVENOUS at 10:19

## 2019-03-25 RX ADMIN — SODIUM CHLORIDE: 9 INJECTION, SOLUTION INTRAVENOUS at 10:12

## 2019-03-25 ASSESSMENT — PAIN - FUNCTIONAL ASSESSMENT: PAIN_FUNCTIONAL_ASSESSMENT: 0-10

## 2019-03-25 ASSESSMENT — PAIN DESCRIPTION - DESCRIPTORS: DESCRIPTORS: CONSTANT

## 2019-03-25 ASSESSMENT — PAIN SCALES - GENERAL: PAINLEVEL_OUTOF10: 0

## 2019-05-14 ENCOUNTER — OFFICE VISIT (OUTPATIENT)
Dept: PHYSICAL MEDICINE AND REHAB | Age: 69
End: 2019-05-14
Payer: MEDICARE

## 2019-05-14 VITALS
WEIGHT: 187 LBS | HEART RATE: 71 BPM | DIASTOLIC BLOOD PRESSURE: 65 MMHG | HEIGHT: 62 IN | BODY MASS INDEX: 34.41 KG/M2 | SYSTOLIC BLOOD PRESSURE: 110 MMHG

## 2019-05-14 DIAGNOSIS — G89.4 CHRONIC PAIN SYNDROME: ICD-10-CM

## 2019-05-14 DIAGNOSIS — M47.816 SPONDYLOSIS OF LUMBAR REGION WITHOUT MYELOPATHY OR RADICULOPATHY: Primary | ICD-10-CM

## 2019-05-14 PROCEDURE — 99213 OFFICE O/P EST LOW 20 MIN: CPT | Performed by: NURSE PRACTITIONER

## 2019-05-14 ASSESSMENT — ENCOUNTER SYMPTOMS
BACK PAIN: 1
COLOR CHANGE: 0

## 2019-05-14 NOTE — PROGRESS NOTES
[address] [facility]  [address]  Dept: [phone]  Dept Fax: [phone]  Loc: [phone]    Visit Date: 5/14/2019    Functionality Assessment/Goals Worksheet     On a scale of 0 (Does not Interfere) to 10 (Completely Interferes)     1. Which number describes how during the past week pain has interfered with       the following:  A. General Activity:  7  B. Mood: 6  C. Walking Ability:  7  D. Normal Work (Includes both work outside the home and housework):  7  E. Relations with Other People:   3  F. Sleep:   5  G. Enjoyment of Life:   8    2. Patient Prefers to Take their Pain Medications:     []  On a regular basis   [x]  Only when necessary    []  Does not take pain medications    3. What are the Patient's Goals/Expectations for Visiting Pain Management? []  Learn about my pain    [x]  Receive Medication   []  Physical Therapy     []  Treat Depression   [x]  Receive Injections    []  Treat Sleep   []  Deal with Anxiety and Stress   []  Treat Opoid Dependence/Addiction   []  Other:      HPI:   Nadia Izquierdo is a 79 y.o. female is here today for    Chief Complaint: Low back pain    HPI     Diagnostic/Confirmatory median branch blocks at the levels of L2-3,L3-4 and L4-5 bilateral under fluoroscopic guidance # 2 with Dr Dionne Tim on 3/25/19. Patient states that she got decent relief for about 2-3 days then pain returned slowly over the next week. Patient states it was the best she had felt in a long time and very happy with the results. Patient states pain became more dull, patient states that walking, standing, and sleeping were improved and able to function better in what she was doing. Patient states that overall improvement was about 75-80% relief.      Previous OV \"Diagnostic/Confirmatory median branch blocks at the levels of L2-3,L3-4 and L4-5 bilateral under fluoroscopic guidance # 1 with Dr Tico Borges on 2/4/19. Patient states overall she feels she has gotten over 50% relief with the injections still today. Patient states the pain has changed from sharp to more dull and more tolerable. Patient states that her ADLs, walking, standing, and sleeping have been easier. \"    Patient would like to proceed with RFA for longer term pain relief. Patient states she would like to proceed with the right side first.     Medications reviewed. Pain scale with out pain medications or at its worst is 8/10. Pain scale with pain medications or at its best is 6/10. Drug screen reviewed from 11/27/18 and was appropriate      The patientis allergic to motrin [ibuprofen] and pcn [penicillins]. Past Medical History  Lavern Fournier  has a past medical history of Adenomatous colon polyp, Chronic gastritis, Coronary artery spasm ([address] GERD (gastroesophageal reflux disease), Hiatal hernia, Hypertension, MVP (mitral valve prolapse), Obesity, Class II, BMI 35-39.9, with comorbidity, Osteopenia, Scoliosis (and kyphoscoliosis), idiopathic, and Vitamin D deficiency. Past Surgical History  The patient  has a past surgical history that includes Carpal tunnel release (2007); Hand surgery (2008); Upper gastrointestinal endoscopy (10/2013); Cardiac catheterization (2008); Cardiac catheterization (12/18/2018); Endoscopy, colon, diagnostic (01/27/2019); Nerve Block Lumb Facet Level 1 Bilateral (Bilateral, 2/4/2019); and Nerve Block Lumb Facet Level 1 Bilateral (Bilateral, 3/25/2019). Family History  This patient's family history includes Alzheimer's Disease in her mother; Heart Attack in her brother and father; Heart Disease in her brother and father; High Blood Pressure in her father and mother. Social History  Lavern Fournier  reports that she has never smoked. She has never used smokeless tobacco. She reports that she does not drink alcohol or use drugs.     Medications    Current Outpatient Medications:     sertraline (ZOLOFT) 50 MG macrocephalic and not microcephalic. Right Ear: External ear normal.   Left Ear: External ear normal.   Eyes: Conjunctivae are normal. Right eye exhibits no discharge. Left eye exhibits no discharge. Neck: No tracheal deviation present. Pulmonary/Chest: Effort normal. No respiratory distress. Musculoskeletal: She exhibits tenderness. She exhibits no edema. Lumbar back: She exhibits decreased range of motion, tenderness, pain and spasm. Back:    Neurological: She is alert and oriented to person, place, and time. No cranial nerve deficit. Skin: Skin is warm and dry. No rash noted. She is not diaphoretic. No pallor. Psychiatric: She has a normal mood and affect. Her speech is normal and behavior is normal. Judgment and thought content normal. She is not actively hallucinating. Cognition and memory are normal. She is attentive. Vitals reviewed. Assessment:     1. Spondylosis of lumbar region without myelopathy or radiculopathy    2. Chronic pain syndrome            Plan:      · OARRS reviewed. Current MED: 0  · Patient was not offered naloxone for home. · Discussed long term side effects of medications, tolerance, dependency and addiction. · Previous UDS reviewed  · Patient told can not receive any pain medications from any other source. · No evidence of abuse, diversion or aberrant behavior.  Medications and/or procedures to improve function and quality of life- patient understanding with this and that may not be pain free   Discussed with patient about safe storage of medications at home   Discussed possible weaning of medication dosing dependent on treatment/procedure results.  Testing: none   Procedures: Lumbar RFA L2-3, L3-4, L4-5 BILATERAL, RIGHT SIDE FIRST - schedule both please    Discussed with patient about risks with procedure including infection, reaction to medication, increased pain, or bleeding.  Medications: none       Meds.  Prescribed:   No orders of

## 2019-05-20 ENCOUNTER — ANESTHESIA EVENT (OUTPATIENT)
Dept: OPERATING ROOM | Age: 69
End: 2019-05-20
Payer: MEDICARE

## 2019-05-20 ENCOUNTER — APPOINTMENT (OUTPATIENT)
Dept: GENERAL RADIOLOGY | Age: 69
End: 2019-05-20
Attending: PAIN MEDICINE
Payer: MEDICARE

## 2019-05-20 ENCOUNTER — ANESTHESIA (OUTPATIENT)
Dept: OPERATING ROOM | Age: 69
End: 2019-05-20
Payer: MEDICARE

## 2019-05-20 ENCOUNTER — HOSPITAL ENCOUNTER (OUTPATIENT)
Age: 69
Setting detail: OUTPATIENT SURGERY
Discharge: HOME | End: 2019-05-20
Attending: PAIN MEDICINE | Admitting: PAIN MEDICINE
Payer: MEDICARE

## 2019-05-20 VITALS
HEART RATE: 69 BPM | DIASTOLIC BLOOD PRESSURE: 65 MMHG | RESPIRATION RATE: 16 BRPM | HEIGHT: 62 IN | OXYGEN SATURATION: 93 % | SYSTOLIC BLOOD PRESSURE: 132 MMHG | BODY MASS INDEX: 34.41 KG/M2 | WEIGHT: 187 LBS | TEMPERATURE: 98 F

## 2019-05-20 VITALS
OXYGEN SATURATION: 97 % | SYSTOLIC BLOOD PRESSURE: 170 MMHG | RESPIRATION RATE: 12 BRPM | DIASTOLIC BLOOD PRESSURE: 79 MMHG

## 2019-05-20 PROCEDURE — 7100000011 HC PHASE II RECOVERY - ADDTL 15 MIN: Performed by: PAIN MEDICINE

## 2019-05-20 PROCEDURE — 2709999900 HC NON-CHARGEABLE SUPPLY: Performed by: PAIN MEDICINE

## 2019-05-20 PROCEDURE — 2500000003 HC RX 250 WO HCPCS: Performed by: PAIN MEDICINE

## 2019-05-20 PROCEDURE — 3700000001 HC ADD 15 MINUTES (ANESTHESIA): Performed by: PAIN MEDICINE

## 2019-05-20 PROCEDURE — 6360000002 HC RX W HCPCS: Performed by: NURSE ANESTHETIST, CERTIFIED REGISTERED

## 2019-05-20 PROCEDURE — 64636 DESTROY L/S FACET JNT ADDL: CPT | Performed by: PAIN MEDICINE

## 2019-05-20 PROCEDURE — 6360000002 HC RX W HCPCS: Performed by: PAIN MEDICINE

## 2019-05-20 PROCEDURE — 7100000010 HC PHASE II RECOVERY - FIRST 15 MIN: Performed by: PAIN MEDICINE

## 2019-05-20 PROCEDURE — 3209999900 FLUORO FOR SURGICAL PROCEDURES

## 2019-05-20 PROCEDURE — 3700000000 HC ANESTHESIA ATTENDED CARE: Performed by: PAIN MEDICINE

## 2019-05-20 PROCEDURE — 3600000056 HC PAIN LEVEL 4 BASE: Performed by: PAIN MEDICINE

## 2019-05-20 PROCEDURE — 64635 DESTROY LUMB/SAC FACET JNT: CPT | Performed by: PAIN MEDICINE

## 2019-05-20 PROCEDURE — 3600000057 HC PAIN LEVEL 4 ADDL 15 MIN: Performed by: PAIN MEDICINE

## 2019-05-20 PROCEDURE — 2500000003 HC RX 250 WO HCPCS: Performed by: NURSE ANESTHETIST, CERTIFIED REGISTERED

## 2019-05-20 RX ORDER — METHYLPREDNISOLONE ACETATE 80 MG/ML
INJECTION, SUSPENSION INTRA-ARTICULAR; INTRALESIONAL; INTRAMUSCULAR; SOFT TISSUE PRN
Status: DISCONTINUED | OUTPATIENT
Start: 2019-05-20 | End: 2019-05-20 | Stop reason: ALTCHOICE

## 2019-05-20 RX ORDER — LIDOCAINE HYDROCHLORIDE 10 MG/ML
INJECTION, SOLUTION INFILTRATION; PERINEURAL PRN
Status: DISCONTINUED | OUTPATIENT
Start: 2019-05-20 | End: 2019-05-20 | Stop reason: SDUPTHER

## 2019-05-20 RX ORDER — ROPIVACAINE HYDROCHLORIDE 2 MG/ML
INJECTION, SOLUTION EPIDURAL; INFILTRATION; PERINEURAL PRN
Status: DISCONTINUED | OUTPATIENT
Start: 2019-05-20 | End: 2019-05-20 | Stop reason: ALTCHOICE

## 2019-05-20 RX ORDER — FENTANYL CITRATE 50 UG/ML
INJECTION, SOLUTION INTRAMUSCULAR; INTRAVENOUS PRN
Status: DISCONTINUED | OUTPATIENT
Start: 2019-05-20 | End: 2019-05-20 | Stop reason: SDUPTHER

## 2019-05-20 RX ORDER — PROPOFOL 10 MG/ML
INJECTION, EMULSION INTRAVENOUS PRN
Status: DISCONTINUED | OUTPATIENT
Start: 2019-05-20 | End: 2019-05-20 | Stop reason: SDUPTHER

## 2019-05-20 RX ORDER — LIDOCAINE HYDROCHLORIDE 10 MG/ML
INJECTION, SOLUTION EPIDURAL; INFILTRATION; INTRACAUDAL; PERINEURAL PRN
Status: DISCONTINUED | OUTPATIENT
Start: 2019-05-20 | End: 2019-05-20 | Stop reason: ALTCHOICE

## 2019-05-20 RX ADMIN — FENTANYL CITRATE 100 MCG: 50 INJECTION INTRAMUSCULAR; INTRAVENOUS at 12:45

## 2019-05-20 RX ADMIN — PROPOFOL 50 MG: 10 INJECTION, EMULSION INTRAVENOUS at 12:54

## 2019-05-20 RX ADMIN — LIDOCAINE HYDROCHLORIDE 3 ML: 10 INJECTION, SOLUTION INFILTRATION; PERINEURAL at 12:54

## 2019-05-20 RX ADMIN — PROPOFOL 30 MG: 10 INJECTION, EMULSION INTRAVENOUS at 12:55

## 2019-05-20 ASSESSMENT — PULMONARY FUNCTION TESTS
PIF_VALUE: 0

## 2019-05-20 ASSESSMENT — PAIN SCALES - GENERAL: PAINLEVEL_OUTOF10: 2

## 2019-05-20 ASSESSMENT — PAIN DESCRIPTION - DESCRIPTORS: DESCRIPTORS: ACHING;NAGGING

## 2019-05-20 ASSESSMENT — PAIN DESCRIPTION - PAIN TYPE: TYPE: CHRONIC PAIN

## 2019-05-20 ASSESSMENT — PAIN DESCRIPTION - FREQUENCY: FREQUENCY: CONTINUOUS

## 2019-05-20 ASSESSMENT — PAIN DESCRIPTION - LOCATION: LOCATION: BACK

## 2019-05-20 ASSESSMENT — PAIN - FUNCTIONAL ASSESSMENT: PAIN_FUNCTIONAL_ASSESSMENT: 0-10

## 2019-05-20 ASSESSMENT — PAIN DESCRIPTION - ORIENTATION: ORIENTATION: LOWER

## 2019-05-20 NOTE — PROGRESS NOTES
1259 Awake and oriented on arrival to PACU pain minimal  1305  to bedside pt given something  To eat and drink  1325 Pt up to chair to get dressed gait steady

## 2019-05-20 NOTE — ANESTHESIA PRE PROCEDURE
BP Readings from Last 3 Encounters:   05/20/19 126/67   05/14/19 110/65   03/25/19 (!) 102/59       NPO Status: Time of last liquid consumption: 0500                        Time of last solid consumption: 1800                        Date of last liquid consumption: 05/20/19                        Date of last solid food consumption: 05/19/19    BMI:   Wt Readings from Last 3 Encounters:   05/20/19 187 lb (84.8 kg)   05/14/19 187 lb (84.8 kg)   03/25/19 187 lb (84.8 kg)     Body mass index is 34.2 kg/m². CBC:   Lab Results   Component Value Date    WBC 5.7 12/11/2018    RBC 4.17 12/11/2018    HGB 12.6 12/11/2018    HCT 37.7 12/11/2018    MCV 90.3 12/11/2018    RDW 13.6 12/11/2018     12/11/2018       CMP:   Lab Results   Component Value Date     12/20/2018    K 4.0 12/20/2018     12/20/2018    CO2 23 12/20/2018    BUN 20 12/20/2018    CREATININE 0.57 12/20/2018    AGRATIO 1.6 12/14/2018    LABGLOM 72 04/10/2018    GLUCOSE 91 12/20/2018    PROT 7.4 12/14/2018    CALCIUM 9.5 12/20/2018    BILITOT 1.1 12/14/2018    ALKPHOS 88 12/14/2018    AST 27 12/14/2018    ALT 21 12/14/2018       POC Tests: No results for input(s): POCGLU, POCNA, POCK, POCCL, POCBUN, POCHEMO, POCHCT in the last 72 hours.     Coags:   Lab Results   Component Value Date    PROTIME 12.0 12/14/2018    INR 1.04 12/14/2018       HCG (If Applicable): No results found for: PREGTESTUR, PREGSERUM, HCG, HCGQUANT     ABGs: No results found for: PHART, PO2ART, MZI0ODW, EWR1YEA, BEART, L6XPTOFE     Type & Screen (If Applicable):  No results found for: LABABO, LABRH    Anesthesia Evaluation    Airway: Mallampati: II       Mouth opening: > = 3 FB Dental:          Pulmonary:                              Cardiovascular:    (+) hypertension:, CAD:,                   Neuro/Psych:               GI/Hepatic/Renal:   (+) hiatal hernia, GERD:,           Endo/Other:                     Abdominal:   (+) obese,         Vascular: Anesthesia Plan      MAC     ASA 3             Anesthetic plan and risks discussed with patient. Plan discussed with CRNA.                   Samantha Coppola MD   5/20/2019

## 2019-06-03 ENCOUNTER — OFFICE VISIT (OUTPATIENT)
Dept: PHYSICAL MEDICINE AND REHAB | Age: 69
End: 2019-06-03
Payer: MEDICARE

## 2019-06-03 VITALS
HEIGHT: 62 IN | SYSTOLIC BLOOD PRESSURE: 113 MMHG | DIASTOLIC BLOOD PRESSURE: 70 MMHG | HEART RATE: 56 BPM | WEIGHT: 187 LBS | BODY MASS INDEX: 34.41 KG/M2

## 2019-06-03 DIAGNOSIS — G89.4 CHRONIC PAIN SYNDROME: ICD-10-CM

## 2019-06-03 DIAGNOSIS — M46.1 SI (SACROILIAC) JOINT INFLAMMATION (HCC): ICD-10-CM

## 2019-06-03 DIAGNOSIS — M47.816 SPONDYLOSIS OF LUMBAR REGION WITHOUT MYELOPATHY OR RADICULOPATHY: Primary | ICD-10-CM

## 2019-06-03 PROCEDURE — 99213 OFFICE O/P EST LOW 20 MIN: CPT | Performed by: NURSE PRACTITIONER

## 2019-06-03 ASSESSMENT — ENCOUNTER SYMPTOMS
BACK PAIN: 1
COLOR CHANGE: 0

## 2019-06-03 NOTE — PROGRESS NOTES
[address] [facility]  [address] [facility] [address]  Dept: [phone]  Dept Fax: [phone]: [phone]    Visit Date: 6/3/2019    Functionality Assessment/Goals Worksheet     On a scale of 0 (Does not Interfere) to 10 (Completely Interferes)     1. Which number describes how during the past week pain has interfered with       the following:  A. General Activity:  9  B. Mood: 6  C. Walking Ability:  9  D. Normal Work (Includes both work outside the home and housework):  9  E. Relations with Other People:   3  F. Sleep:   3  G. Enjoyment of Life:   9    2. Patient Prefers to Take their Pain Medications:     []  On a regular basis   [x]  Only when necessary    []  Does not take pain medications    3. What are the Patient's Goals/Expectations for Visiting Pain Management? []  Learn about my pain    []  Receive Medication   []  Physical Therapy     []  Treat Depression   [x]  Receive Injections    []  Treat Sleep   []  Deal with Anxiety and Stress   []  Treat Opoid Dependence/Addiction   []  Other:  Pain in area where I had injection         HPI:   Neri Sanders is a 79 y.o. female is here today for    Chief Complaint: Low back pain    HPI     Radiofrequency ablation of median branches at the levels of L2-3,L3-4 and L4-5 right under fluoroscopic guidance with Dr Arroyo Poster on 5/20/19. Patient states that her right low back with at least 50% relief, but states hard to tell for sure as left side needs done also. Patient states she has had some in tenderness to the touch in the right buttock. Patient has been using Aspercreme with lidocaine and states it only helps a little bit. Discussed using more consistently the lidocaine, ibuprofen, and ice PRN. Patient agreeable. Patient with osteoporosis, discussed f/u with Lexii Vilchis for another dexa scan. Will avoid steroids at this time and monitor results. Medications reviewed. Pain scale with out pain medications or at its worst is 6/10. Pain scale with pain medications or at its best is 5/10. Drug screen reviewed from 11/27/18 and was appropriate      The patientis allergic to motrin [ibuprofen] and pcn [penicillins]. Past Medical History  Juan Carvajal  has a past medical history of Adenomatous colon polyp, Chronic gastritis, Coronary artery spasm ([address] GERD (gastroesophageal reflux disease), Hiatal hernia, Hyperlipidemia, Hypertension, MVP (mitral valve prolapse), Obesity, Class II, BMI 35-39.9, with comorbidity, Osteopenia, Scoliosis (and kyphoscoliosis), idiopathic, and Vitamin D deficiency. Past Surgical History  The patient  has a past surgical history that includes Carpal tunnel release (2007); Hand surgery (2008); Upper gastrointestinal endoscopy (10/2013); Cardiac catheterization (2008); Cardiac catheterization (12/18/2018); Endoscopy, colon, diagnostic (01/27/2019); Nerve Block Lumb Facet Level 1 Bilateral (Bilateral, 2/4/2019); Nerve Block Lumb Facet Level 1 Bilateral (Bilateral, 3/25/2019); and Lumbar spine surgery (Right, 5/20/2019). Family History  This patient's family history includes Alzheimer's Disease in her mother; Heart Attack in her brother and father; Heart Disease in her brother and father; High Blood Pressure in her father and mother. Social History  Juan Carvajal  reports that she has never smoked. She has never used smokeless tobacco. She reports that she does not drink alcohol or use drugs.     Medications    Current Outpatient Medications:     pantoprazole sodium (PROTONIX) 40 MG PACK packet, Take 40 mg by mouth every morning (before breakfast), Disp: , Rfl:     ibuprofen (ADVIL;MOTRIN) 200 MG tablet, Take 200 mg by mouth every 6 hours as needed for Pain, Disp: , Rfl:     amLODIPine (NORVASC) 2.5 MG tablet, Take 1 tablet by mouth daily, Disp: 90 tablet, Rfl: 3    simvastatin (ZOCOR) 20 MG tablet, TAKE 1 TABLET BY MOUTH EVERY DAY, Disp: 90 tablet, Rfl: 3    lisinopril (PRINIVIL;ZESTRIL) 20 MG tablet, TAKE 1 TABLET BY MOUTH DAILY. , Disp: 90 tablet, Rfl: 3    Cholecalciferol (VITAMIN D3) 5000 UNITS CAPS, Take 1 capsule by mouth daily. , Disp: , Rfl:     aspirin 81 MG tablet, Take 81 mg by mouth daily. , Disp: , Rfl:     calcium carbonate 600 MG TABS tablet, Take 1 tablet by mouth 2 times daily. , Disp: , Rfl:     Subjective:      Review of Systems   Constitutional: Positive for activity change. Negative for chills, diaphoresis, fatigue and fever. Genitourinary: Negative for difficulty urinating, frequency and urgency. Musculoskeletal: Positive for arthralgias, back pain and myalgias. Negative for gait problem, neck pain and neck stiffness. Skin: Negative for color change, rash and wound. Allergic/Immunologic: Negative for environmental allergies and food allergies. Neurological: Negative for dizziness, seizures, light-headedness, numbness and headaches. Hematological: Does not bruise/bleed easily. Psychiatric/Behavioral: Negative for sleep disturbance. The patient is not nervous/anxious. Objective:     Vitals:    06/03/19 0745   BP: 113/70   Site: Left Upper Arm   Position: Sitting   Cuff Size: Large Adult   Pulse: 56   Weight: 187 lb (84.8 kg)   Height: 5' 2\" (1.575 m)       Physical Exam   Constitutional: She is oriented to person, place, and time. She appears well-developed and well-nourished. No distress. HENT:   Head: Normocephalic and atraumatic. Not macrocephalic and not microcephalic. Right Ear: External ear normal.   Left Ear: External ear normal.   Eyes: Conjunctivae are normal. Right eye exhibits no discharge. Left eye exhibits no discharge. Neck: No tracheal deviation present. Pulmonary/Chest: Effort normal. No respiratory distress. Musculoskeletal: She exhibits tenderness. She exhibits no edema. Lumbar back: She exhibits decreased range of motion, tenderness, pain and spasm. Back:    Neurological: She is alert and oriented to person, place, and time. No cranial nerve deficit. Skin: Skin is warm and dry. No rash noted. She is not diaphoretic. No pallor. Psychiatric: She has a normal mood and affect. Her speech is normal and behavior is normal. Judgment and thought content normal. She is not actively hallucinating. Cognition and memory are normal. She is attentive. Vitals reviewed. Assessment:     1. Spondylosis of lumbar region without myelopathy or radiculopathy    2. Chronic pain syndrome    3. SI (sacroiliac) joint inflammation ([facility] [address]            Plan:      · OARRS reviewed. Current MED: 0  · Patient was not offered naloxone for home. · Discussed long term side effects of medications, tolerance, dependency and addiction. · Previous UDS reviewed  · UDS preformed today for compliance. · Patient told can not receive any pain medications from any other source. · No evidence of abuse, diversion or aberrant behavior.  Medications and/or procedures to improve function and quality of life- patient understanding with this and that may not be pain free   Discussed with patient about safe storage of medications at home   Discussed possible weaning of medication dosing dependent on treatment/procedure results.  Testing: none    Procedures: Lumbar RFA  L2-3,L3-4 and L4-5 LEFT - already scheduled    Discussed with patient about risks with procedure including infection, reaction to medication, increased pain, or bleeding.  Medications: Discussed using more consistently the lidocaine, ibuprofen, and ice PRN. Meds. Prescribed:   No orders of the defined types were placed in this encounter. Return for Lumbar RFA  L2-3,L3-4 and L4-5 LEFT - already scheduled , follow up after procedure.          Electronically signed by SHERRY Peng CNP on6/3/2019 at 8:04 AM

## 2019-06-06 LAB — LDL CHOLESTEROL DIRECT: 84 MG/DL

## 2019-06-07 DIAGNOSIS — E55.9 VITAMIN D DEFICIENCY: ICD-10-CM

## 2019-06-07 DIAGNOSIS — E78.00 PURE HYPERCHOLESTEROLEMIA: ICD-10-CM

## 2019-06-09 DIAGNOSIS — E78.00 PURE HYPERCHOLESTEROLEMIA: Primary | ICD-10-CM

## 2019-06-09 DIAGNOSIS — E55.9 VITAMIN D DEFICIENCY: ICD-10-CM

## 2019-06-10 ENCOUNTER — PREP FOR PROCEDURE (OUTPATIENT)
Dept: PHYSICAL MEDICINE AND REHAB | Age: 69
End: 2019-06-10

## 2019-06-10 ENCOUNTER — TELEPHONE (OUTPATIENT)
Dept: FAMILY MEDICINE CLINIC | Age: 69
End: 2019-06-10

## 2019-06-10 NOTE — TELEPHONE ENCOUNTER
----- Message from Donnette Spurling, MD sent at 6/9/2019  5:28 PM EDT -----  Let her know the lab was fine and no med changes are needed.  Check the fasting labs in early December

## 2019-06-10 NOTE — H&P (VIEW-ONLY)
Shannan Khanna is a 79 y.o. female is here today for     Chief Complaint: Low back pain          The patientis allergic to motrin [ibuprofen] and pcn [penicillins]. Past Medical History  Floyd Gustafson  has a past medical history of Adenomatous colon polyp, Chronic gastritis, Coronary artery spasm ([address] GERD (gastroesophageal reflux disease), Hiatal hernia, Hyperlipidemia, Hypertension, MVP (mitral valve prolapse), Obesity, Class II, BMI 35-39.9, with comorbidity, Osteopenia, Scoliosis (and kyphoscoliosis), idiopathic, and Vitamin D deficiency. Past Surgical History  The patient  has a past surgical history that includes Carpal tunnel release (2007); Hand surgery (2008); Upper gastrointestinal endoscopy (10/2013); Cardiac catheterization (2008); Cardiac catheterization (12/18/2018); Endoscopy, colon, diagnostic (01/27/2019); Nerve Block Lumb Facet Level 1 Bilateral (Bilateral, 2/4/2019); Nerve Block Lumb Facet Level 1 Bilateral (Bilateral, 3/25/2019); and Lumbar spine surgery (Right, 5/20/2019). Family History  This patient's family history includes Alzheimer's Disease in her mother; Heart Attack in her brother and father; Heart Disease in her brother and father; High Blood Pressure in her father and mother. Social History  Floyd Gustafson  reports that she has never smoked. She has never used smokeless tobacco. She reports that she does not drink alcohol or use drugs.      Medications    Current Medication      Current Outpatient Medications:     pantoprazole sodium (PROTONIX) 40 MG PACK packet, Take 40 mg by mouth every morning (before breakfast), Disp: , Rfl:     ibuprofen (ADVIL;MOTRIN) 200 MG tablet, Take 200 mg by mouth every 6 hours as needed for Pain, Disp: , Rfl:     amLODIPine (NORVASC) 2.5 MG tablet, Take 1 tablet by mouth daily, Disp: 90 tablet, Rfl: 3    simvastatin (ZOCOR) 20 MG tablet, TAKE 1 TABLET BY MOUTH EVERY DAY, Disp: 90 tablet, Rfl: 3    lisinopril (PRINIVIL;ZESTRIL) 20 MG tablet, TAKE 1 TABLET BY MOUTH DAILY. , Disp: 90 tablet, Rfl: 3    Cholecalciferol (VITAMIN D3) 5000 UNITS CAPS, Take 1 capsule by mouth daily. , Disp: , Rfl:     aspirin 81 MG tablet, Take 81 mg by mouth daily. , Disp: , Rfl:     calcium carbonate 600 MG TABS tablet, Take 1 tablet by mouth 2 times daily. , Disp: , Rfl:         Subjective:      Review of Systems   Constitutional: Positive for activity change. Negative for chills, diaphoresis, fatigue and fever. Genitourinary: Negative for difficulty urinating, frequency and urgency. Musculoskeletal: Positive for arthralgias, back pain and myalgias. Negative for gait problem, neck pain and neck stiffness. Skin: Negative for color change, rash and wound. Allergic/Immunologic: Negative for environmental allergies and food allergies. Neurological: Negative for dizziness, seizures, light-headedness, numbness and headaches. Hematological: Does not bruise/bleed easily. Psychiatric/Behavioral: Negative for sleep disturbance. The patient is not nervous/anxious. Objective:      Vitals                                  Weight: 187 lb (84.8 kg)   Height: 5' 2\" (1.575 m)            Physical Exam   Constitutional: She is oriented to person, place, and time. She appears well-developed and well-nourished. No distress. HENT:   Head: Normocephalic and atraumatic. Not macrocephalic and not microcephalic. Right Ear: External ear normal.   Left Ear: External ear normal.   Eyes: Conjunctivae are normal. Right eye exhibits no discharge. Left eye exhibits no discharge. Neck: No tracheal deviation present. Pulmonary/Chest: Effort normal. No respiratory distress. Musculoskeletal: She exhibits tenderness. She exhibits no edema. Lumbar back: She exhibits decreased range of motion, tenderness, pain and spasm. Back:    Neurological: She is alert and oriented to person, place, and time. No cranial nerve deficit. Skin: Skin is warm and dry. No rash noted.  She is not diaphoretic. No pallor. Psychiatric: She has a normal mood and affect. Her speech is normal and behavior is normal. Judgment and thought content normal. She is not actively hallucinating. Cognition and memory are normal. She is attentive. Vitals reviewed. Assessment:      1. Spondylosis of lumbar region without myelopathy or radiculopathy    2. Chronic pain syndrome    3.  SI (sacroiliac) joint inflammation (HCC)          Plan:  Lumbar RFA  L2-3,L3-4 and L4-5 LEFT       SHERRY Cobian - KADY  6/10/2019

## 2019-06-10 NOTE — H&P
Roby Gomez is a 79 y.o. female is here today for     Chief Complaint: Low back pain          The patientis allergic to motrin [ibuprofen] and pcn [penicillins]. Past Medical History  Juan Carvajal  has a past medical history of Adenomatous colon polyp, Chronic gastritis, Coronary artery spasm ([address] GERD (gastroesophageal reflux disease), Hiatal hernia, Hyperlipidemia, Hypertension, MVP (mitral valve prolapse), Obesity, Class II, BMI 35-39.9, with comorbidity, Osteopenia, Scoliosis (and kyphoscoliosis), idiopathic, and Vitamin D deficiency. Past Surgical History  The patient  has a past surgical history that includes Carpal tunnel release (2007); Hand surgery (2008); Upper gastrointestinal endoscopy (10/2013); Cardiac catheterization (2008); Cardiac catheterization (12/18/2018); Endoscopy, colon, diagnostic (01/27/2019); Nerve Block Lumb Facet Level 1 Bilateral (Bilateral, 2/4/2019); Nerve Block Lumb Facet Level 1 Bilateral (Bilateral, 3/25/2019); and Lumbar spine surgery (Right, 5/20/2019). Family History  This patient's family history includes Alzheimer's Disease in her mother; Heart Attack in her brother and father; Heart Disease in her brother and father; High Blood Pressure in her father and mother. Social History  Juan Carvajal  reports that she has never smoked. She has never used smokeless tobacco. She reports that she does not drink alcohol or use drugs.      Medications    Current Medication      Current Outpatient Medications:     pantoprazole sodium (PROTONIX) 40 MG PACK packet, Take 40 mg by mouth every morning (before breakfast), Disp: , Rfl:     ibuprofen (ADVIL;MOTRIN) 200 MG tablet, Take 200 mg by mouth every 6 hours as needed for Pain, Disp: , Rfl:     amLODIPine (NORVASC) 2.5 MG tablet, Take 1 tablet by mouth daily, Disp: 90 tablet, Rfl: 3    simvastatin (ZOCOR) 20 MG tablet, TAKE 1 TABLET BY MOUTH EVERY DAY, Disp: 90 tablet, Rfl: 3    lisinopril (PRINIVIL;ZESTRIL) 20 MG tablet, TAKE 1 TABLET BY MOUTH DAILY. , Disp: 90 tablet, Rfl: 3    Cholecalciferol (VITAMIN D3) 5000 UNITS CAPS, Take 1 capsule by mouth daily. , Disp: , Rfl:     aspirin 81 MG tablet, Take 81 mg by mouth daily. , Disp: , Rfl:     calcium carbonate 600 MG TABS tablet, Take 1 tablet by mouth 2 times daily. , Disp: , Rfl:         Subjective:      Review of Systems   Constitutional: Positive for activity change. Negative for chills, diaphoresis, fatigue and fever. Genitourinary: Negative for difficulty urinating, frequency and urgency. Musculoskeletal: Positive for arthralgias, back pain and myalgias. Negative for gait problem, neck pain and neck stiffness. Skin: Negative for color change, rash and wound. Allergic/Immunologic: Negative for environmental allergies and food allergies. Neurological: Negative for dizziness, seizures, light-headedness, numbness and headaches. Hematological: Does not bruise/bleed easily. Psychiatric/Behavioral: Negative for sleep disturbance. The patient is not nervous/anxious. Objective:      Vitals                                  Weight: 187 lb (84.8 kg)   Height: 5' 2\" (1.575 m)            Physical Exam   Constitutional: She is oriented to person, place, and time. She appears well-developed and well-nourished. No distress. HENT:   Head: Normocephalic and atraumatic. Not macrocephalic and not microcephalic. Right Ear: External ear normal.   Left Ear: External ear normal.   Eyes: Conjunctivae are normal. Right eye exhibits no discharge. Left eye exhibits no discharge. Neck: No tracheal deviation present. Pulmonary/Chest: Effort normal. No respiratory distress. Musculoskeletal: She exhibits tenderness. She exhibits no edema. Lumbar back: She exhibits decreased range of motion, tenderness, pain and spasm. Back:    Neurological: She is alert and oriented to person, place, and time. No cranial nerve deficit. Skin: Skin is warm and dry. No rash noted.  She is

## 2019-06-17 ENCOUNTER — HOSPITAL ENCOUNTER (OUTPATIENT)
Age: 69
Setting detail: OUTPATIENT SURGERY
Discharge: HOME | End: 2019-06-17
Attending: PAIN MEDICINE | Admitting: PAIN MEDICINE
Payer: MEDICARE

## 2019-06-17 ENCOUNTER — APPOINTMENT (OUTPATIENT)
Dept: GENERAL RADIOLOGY | Age: 69
End: 2019-06-17
Attending: PAIN MEDICINE
Payer: MEDICARE

## 2019-06-17 ENCOUNTER — ANESTHESIA EVENT (OUTPATIENT)
Dept: OPERATING ROOM | Age: 69
End: 2019-06-17
Payer: MEDICARE

## 2019-06-17 ENCOUNTER — ANESTHESIA (OUTPATIENT)
Dept: OPERATING ROOM | Age: 69
End: 2019-06-17
Payer: MEDICARE

## 2019-06-17 VITALS
WEIGHT: 186 LBS | TEMPERATURE: 97.7 F | HEART RATE: 69 BPM | BODY MASS INDEX: 34.23 KG/M2 | DIASTOLIC BLOOD PRESSURE: 54 MMHG | SYSTOLIC BLOOD PRESSURE: 92 MMHG | OXYGEN SATURATION: 94 % | RESPIRATION RATE: 18 BRPM | HEIGHT: 62 IN

## 2019-06-17 VITALS
DIASTOLIC BLOOD PRESSURE: 58 MMHG | RESPIRATION RATE: 11 BRPM | SYSTOLIC BLOOD PRESSURE: 122 MMHG | OXYGEN SATURATION: 97 %

## 2019-06-17 PROCEDURE — 7100000010 HC PHASE II RECOVERY - FIRST 15 MIN: Performed by: PAIN MEDICINE

## 2019-06-17 PROCEDURE — 3600000056 HC PAIN LEVEL 4 BASE: Performed by: PAIN MEDICINE

## 2019-06-17 PROCEDURE — 64635 DESTROY LUMB/SAC FACET JNT: CPT | Performed by: PAIN MEDICINE

## 2019-06-17 PROCEDURE — 3700000001 HC ADD 15 MINUTES (ANESTHESIA): Performed by: PAIN MEDICINE

## 2019-06-17 PROCEDURE — 7100000011 HC PHASE II RECOVERY - ADDTL 15 MIN: Performed by: PAIN MEDICINE

## 2019-06-17 PROCEDURE — 64636 DESTROY L/S FACET JNT ADDL: CPT | Performed by: PAIN MEDICINE

## 2019-06-17 PROCEDURE — 2500000003 HC RX 250 WO HCPCS: Performed by: PAIN MEDICINE

## 2019-06-17 PROCEDURE — 3209999900 FLUORO FOR SURGICAL PROCEDURES

## 2019-06-17 PROCEDURE — 2709999900 HC NON-CHARGEABLE SUPPLY: Performed by: PAIN MEDICINE

## 2019-06-17 PROCEDURE — 6360000002 HC RX W HCPCS: Performed by: PAIN MEDICINE

## 2019-06-17 PROCEDURE — 3700000000 HC ANESTHESIA ATTENDED CARE: Performed by: PAIN MEDICINE

## 2019-06-17 PROCEDURE — 3600000057 HC PAIN LEVEL 4 ADDL 15 MIN: Performed by: PAIN MEDICINE

## 2019-06-17 PROCEDURE — 6360000002 HC RX W HCPCS: Performed by: NURSE ANESTHETIST, CERTIFIED REGISTERED

## 2019-06-17 RX ORDER — LIDOCAINE HYDROCHLORIDE 10 MG/ML
INJECTION, SOLUTION EPIDURAL; INFILTRATION; INTRACAUDAL; PERINEURAL PRN
Status: DISCONTINUED | OUTPATIENT
Start: 2019-06-17 | End: 2019-06-17 | Stop reason: ALTCHOICE

## 2019-06-17 RX ORDER — FENTANYL CITRATE 50 UG/ML
INJECTION, SOLUTION INTRAMUSCULAR; INTRAVENOUS PRN
Status: DISCONTINUED | OUTPATIENT
Start: 2019-06-17 | End: 2019-06-17 | Stop reason: SDUPTHER

## 2019-06-17 RX ORDER — PROPOFOL 10 MG/ML
INJECTION, EMULSION INTRAVENOUS PRN
Status: DISCONTINUED | OUTPATIENT
Start: 2019-06-17 | End: 2019-06-17 | Stop reason: SDUPTHER

## 2019-06-17 RX ORDER — ROPIVACAINE HYDROCHLORIDE 2 MG/ML
INJECTION, SOLUTION EPIDURAL; INFILTRATION; PERINEURAL PRN
Status: DISCONTINUED | OUTPATIENT
Start: 2019-06-17 | End: 2019-06-17 | Stop reason: ALTCHOICE

## 2019-06-17 RX ORDER — METHYLPREDNISOLONE ACETATE 80 MG/ML
INJECTION, SUSPENSION INTRA-ARTICULAR; INTRALESIONAL; INTRAMUSCULAR; SOFT TISSUE PRN
Status: DISCONTINUED | OUTPATIENT
Start: 2019-06-17 | End: 2019-06-17 | Stop reason: ALTCHOICE

## 2019-06-17 RX ADMIN — FENTANYL CITRATE 50 MCG: 50 INJECTION INTRAMUSCULAR; INTRAVENOUS at 08:17

## 2019-06-17 RX ADMIN — FENTANYL CITRATE 50 MCG: 50 INJECTION INTRAMUSCULAR; INTRAVENOUS at 08:21

## 2019-06-17 RX ADMIN — PROPOFOL 50 MG: 10 INJECTION, EMULSION INTRAVENOUS at 08:26

## 2019-06-17 ASSESSMENT — PULMONARY FUNCTION TESTS
PIF_VALUE: 0

## 2019-06-17 ASSESSMENT — PAIN DESCRIPTION - DESCRIPTORS: DESCRIPTORS: DISCOMFORT

## 2019-06-17 ASSESSMENT — PAIN - FUNCTIONAL ASSESSMENT: PAIN_FUNCTIONAL_ASSESSMENT: 0-10

## 2019-06-17 NOTE — INTERVAL H&P NOTE
H&P Update    Patient's History and Physical from Tatum 10, 2019 was reviewed. Patient examined. There has been no change.     Vashti Oar

## 2019-06-17 NOTE — ANESTHESIA PRE PROCEDURE
Department of Anesthesiology  Preprocedure Note       Name:  Jaden Ruano   Age:  79 y.o.  :  1950                                          MRN:  [id number]         Date:  2019      Surgeon: Alistair Marie):  Zee Menendez MD    Procedure: RFA L2-3, L3-4, L4-5, LEFT SIDE (Left )    Medications prior to admission:   Prior to Admission medications    Medication Sig Start Date End Date Taking? Authorizing Provider   pantoprazole sodium (PROTONIX) 40 MG PACK packet Take 40 mg by mouth every morning (before breakfast)    Historical Provider, MD   ibuprofen (ADVIL;MOTRIN) 200 MG tablet Take 200 mg by mouth every 6 hours as needed for Pain    Historical Provider, MD   amLODIPine (NORVASC) 2.5 MG tablet Take 1 tablet by mouth daily 10/9/18 10/9/19  Jud Gottlieb MD   simvastatin (ZOCOR) 20 MG tablet TAKE 1 TABLET BY MOUTH EVERY DAY 10/9/18   Jud Gottlieb MD   lisinopril (PRINIVIL;ZESTRIL) 20 MG tablet TAKE 1 TABLET BY MOUTH DAILY. 10/9/18   Jud Gottlieb MD   Cholecalciferol (VITAMIN D3) 5000 UNITS CAPS Take 1 capsule by mouth daily. Jud Gottlieb MD   aspirin 81 MG tablet Take 81 mg by mouth daily. Historical Provider, MD   calcium carbonate 600 MG TABS tablet Take 1 tablet by mouth 2 times daily. Historical Provider, MD       Current medications:    No current outpatient medications on file. No current facility-administered medications for this visit. Allergies:     Allergies   Allergen Reactions    Motrin [Ibuprofen]      Palpitations with high doses    Pcn [Penicillins] Rash       Problem List:    Patient Active Problem List   Diagnosis Code    Anxiety and depression F41.9, F32.9    Obesity, Class II, BMI 35-39.9, with comorbidity [id number]    Osteopenia M85.80    Vitamin D deficiency E55.9    Chronic gastritis K29.50    Tonsillar hypertrophy J35.1    Globus pharyngeus F45.8    Disease of larynx J38.7    GERD (gastroesophageal reflux disease) K21.9  Dysphagia R13.10    Nasal septal deviation J34.2    Epistaxis R04.0    Nasal mucositis (ulcerative) J34.81    Multiple thyroid nodules E04.2    Pure hypercholesterolemia E78.00    Essential hypertension I10    Lumbar spondylosis M47.816    Spondylosis of lumbar region without myelopathy or radiculopathy M47.816       Past Medical History:        Diagnosis Date    Adenomatous colon polyp 2012    Chronic gastritis 2013    Coronary artery spasm (HCC)     GERD (gastroesophageal reflux disease) 2002    Hiatal hernia 01/27/2019    Hyperlipidemia     Hypertension 2001    MVP (mitral valve prolapse)     Obesity, Class II, BMI 35-39.9, with comorbidity 2012    Osteopenia 10/2012    Scoliosis (and kyphoscoliosis), idiopathic     Vitamin D deficiency 2012       Past Surgical History:        Procedure Laterality Date    CARDIAC CATHETERIZATION  2008    CARDIAC CATHETERIZATION  12/18/2018    CARPAL TUNNEL RELEASE  2007    right    ENDOSCOPY, COLON, DIAGNOSTIC  01/27/2019    esophagus stretching    HAND SURGERY  2008    left trigger finger    LUMBAR SPINE SURGERY Right 5/20/2019    LUMBAR RFA, L2-3, L3-4, L4-5, RIGHT SIDE performed by Zee Menendez MD at [facility] 41 FACET LEVEL 1 BILATERAL Bilateral 2/4/2019    LUMBAR FACET MBB @ L2-3,  L3-4,  L4-5   BILATERAL performed by Zee Menendez MD at [place] [address] 1 BILATERAL Bilateral 3/25/2019    LUMBAR FACET MBB @ L2-3,  L3-4,  L4-5   BILATERAL performed by Zee Menendez MD at [address] [place] [address]  10/2013       Social History:    Social History     Tobacco Use    Smoking status: Never Smoker    Smokeless tobacco: Never Used   Substance Use Topics    Alcohol use: No                                Counseling given: Not Answered      Vital Signs (Current): There were no vitals filed for this visit. BP Readings from Last 3 Encounters:   06/03/19 113/70   05/20/19 132/65   05/20/19 (!) 170/79       NPO Status:                                                                                 BMI:   Wt Readings from Last 3 Encounters:   06/03/19 187 lb (84.8 kg)   05/20/19 187 lb (84.8 kg)   05/14/19 187 lb (84.8 kg)     There is no height or weight on file to calculate BMI.    CBC:   Lab Results   Component Value Date    WBC 5.7 12/11/2018    RBC 4.17 12/11/2018    HGB 12.6 12/11/2018    HCT 37.7 12/11/2018    MCV 90.3 12/11/2018    RDW 13.6 12/11/2018     12/11/2018       CMP:   Lab Results   Component Value Date     12/20/2018    K 4.0 12/20/2018     12/20/2018    CO2 23 12/20/2018    BUN 20 12/20/2018    CREATININE 0.57 12/20/2018    AGRATIO 1.6 12/14/2018    LABGLOM 72 04/10/2018    GLUCOSE 91 12/20/2018    PROT 7.4 12/14/2018    CALCIUM 9.5 12/20/2018    BILITOT 1.1 12/14/2018    ALKPHOS 88 12/14/2018    AST 27 12/14/2018    ALT 21 12/14/2018       POC Tests: No results for input(s): POCGLU, POCNA, POCK, POCCL, POCBUN, POCHEMO, POCHCT in the last 72 hours.     Coags:   Lab Results   Component Value Date    PROTIME 12.0 12/14/2018    INR 1.04 12/14/2018       HCG (If Applicable): No results found for: PREGTESTUR, PREGSERUM, HCG, HCGQUANT     ABGs: No results found for: PHART, PO2ART, CZZ4RKH, DEZ6ERQ, BEART, V2FWXQAF     Type & Screen (If Applicable):  No results found for: LABABO, LABRH    Anesthesia Evaluation    Airway: Mallampati: II       Mouth opening: > = 3 FB Dental:          Pulmonary:                              Cardiovascular:    (+) hypertension:, CAD:,                   Neuro/Psych:               GI/Hepatic/Renal:   (+) hiatal hernia, GERD:,           Endo/Other:                     Abdominal:   (+) obese,         Vascular:                                          Anesthesia Plan      MAC     ASA 3             Anesthetic plan and risks discussed with

## 2019-06-17 NOTE — OP NOTE
Pre-Procedure Note    Patient Name: Jazmín Matta   Date of [address] Record Number: [id number]  Date: 6/17/19    Indication:  Lower back pain  Consent: On file. Vital Signs:   Vitals:    06/17/19 0710   BP: 135/67   Pulse: 65   Resp: 16   Temp: 96.9 °F (36.1 °C)   SpO2: 98%       Past Medical History:   has a past medical history of Adenomatous colon polyp, Chronic gastritis, Coronary artery spasm (HCC), GERD (gastroesophageal reflux disease), Hiatal hernia, Hyperlipidemia, Hypertension, MVP (mitral valve prolapse), Obesity, Class II, BMI 35-39.9, with comorbidity, Osteopenia, Scoliosis (and kyphoscoliosis), idiopathic, and Vitamin D deficiency. Past Surgical History:   has a past surgical history that includes Carpal tunnel release (2007); Hand surgery (2008); Upper gastrointestinal endoscopy (10/2013); Cardiac catheterization (2008); Cardiac catheterization (12/18/2018); Endoscopy, colon, diagnostic (01/27/2019); Nerve Block Lumb Facet Level 1 Bilateral (Bilateral, 2/4/2019); Nerve Block Lumb Facet Level 1 Bilateral (Bilateral, 3/25/2019); and Lumbar spine surgery (Right, 5/20/2019). Pre-Sedation Documentation and Exam:   Vital signs have been reviewed (see flow sheet for vitals). Sedation/ Anesthesia Plan:   MAC    Patient is an appropriate candidate for plan of sedation: yes    Preoperative Diagnosis:  L-spondylosis    Post-Op Dx: as above    Procedure Performed:  :Radiofrequency ablation of median branches at the levels of L2-3,L3-4 and L4-5 left under fluoroscopic guidance     Indication for the Procedure: The patient has ahistory of chronic low back pain that is not responding well to the conservative treatment. Patient's pain is mostly axial in nature. Pain is interfering with the activities of daily living. Physical examination revealed facet tenderness and facet loading is positive.   Patient had undergone lumbar facet joint injections with pain relief that lasted for only a short period of time and had greater than 70% pain relief with confirmatory median branch blocks. Hence we decided to do radiofrequency abalation of median branches for long term pain releif. The procedure and risks  were discussed with the patient and an informed consent was obtained. Procedure:  Left side   A meaningful communication was kept up with the patient throughout the procedure. The patient is placed in prone position and skin over the back was prepped and draped in sterile manner. Then using fluoroscopy the junction of the transverse process of the vertebra with the superior process of the facet joint was observed and the view was optimized. The skin and deep tissues posterior were infiltrated with 9 ml of  1% xylocaine. The RF canula with the 10 mm active tip was introduced through the skin wheal under fluoroscopy guidance such that the tip of the needle lies in the groove of the transverse process with the superior processes of the facet joint. Then a lateral view of the lumbar spine was obtained to make sure the tip of needle is not in the neural foramen. Then electric impedence was checked to make sure it is acceptable. Then a sensory stimulus was applied at 50 Hz up to 1 volt and concordant pain symptoms were reproduced. Then a motor stimulus was applied at 2 Hz up to 2 volts and no motor stimulation was seen in lower extremities. Some multifidus stimulus was seen. Then after negative aspiration a mixture of depomedrol 80 mg  and 0.2%  Ropivacaine 1.5 cc  was injected through the needle. Then a initial lesion was done at 80 degrees centigrade for 90 seconds. For the L4 median branch the junction of the transverse process of L5 with the superolateral possible facet joint was taken as a reference point and for S1 median branch the most lateral and superior aspect of S1 foramina was taken as a reference point,.   For L3 median branch the junction of L4 transverse process and superior articular process of facet joint was taken as reference point and so on. Patient's vital signs and neurological status remained stable throughout the procedure and post procedural period. The patient tolerated the procedure well and was discharged home in stable condition.     Electronically signed by Zee Menendez MD on 6/17/19 at 8:18 AM

## 2019-06-17 NOTE — PROGRESS NOTES
0831- Pt arrived to PACU phase 2, VSS, pt breathing deeply on room air, snack given, significant other at bedside. 5039- IV removed pt up to get dressed  0431 35 06 90- Pt given discharge instructions verbalized understanding  [id number] PT discharged home.

## 2019-06-17 NOTE — ANESTHESIA POSTPROCEDURE EVALUATION
Department of Anesthesiology  Postprocedure Note    Patient: Mendel Rear  MRN: [id number]  YOB: 1950  Date of evaluation: 6/17/2019  Time:  9:21 AM     Procedure Summary     Date:  06/17/19 Room / Location:  [facility] OR 03 / [address] [facility]    Anesthesia Start:  0815 Anesthesia Stop:  0830    Procedure:  LUMBAR RFA L2-3, L3-4, L4-5, LEFT SIDE (Left ) Diagnosis:  (LUMBAR SPONDYLOSIS)    Surgeon:  Serene Koch MD Responsible Provider:  Blair Worthy DO    Anesthesia Type:  MAC ASA Status:  3          Anesthesia Type: MAC    Eloy Phase I:      Eloy Phase II: Eloy Score: 10    Last vitals: Reviewed and per EMR flowsheets.        Anesthesia Post Evaluation    Patient location during evaluation: bedside  Patient participation: complete - patient participated  Level of consciousness: awake and alert  Airway patency: patent  Nausea & Vomiting: no nausea and no vomiting  Complications: no  Cardiovascular status: hemodynamically stable  Respiratory status: acceptable  Hydration status: stable

## 2019-06-19 ENCOUNTER — TELEPHONE (OUTPATIENT)
Dept: FAMILY MEDICINE CLINIC | Age: 69
End: 2019-06-19

## 2019-06-19 ENCOUNTER — OFFICE VISIT (OUTPATIENT)
Dept: FAMILY MEDICINE CLINIC | Age: 69
End: 2019-06-19

## 2019-06-19 VITALS
RESPIRATION RATE: 14 BRPM | HEIGHT: 62 IN | HEART RATE: 64 BPM | WEIGHT: 184.25 LBS | BODY MASS INDEX: 33.9 KG/M2 | SYSTOLIC BLOOD PRESSURE: 132 MMHG | DIASTOLIC BLOOD PRESSURE: 70 MMHG

## 2019-06-19 DIAGNOSIS — I10 ESSENTIAL HYPERTENSION: Primary | ICD-10-CM

## 2019-06-19 DIAGNOSIS — Z78.0 POST-MENOPAUSAL: ICD-10-CM

## 2019-06-19 DIAGNOSIS — F33.41 RECURRENT MAJOR DEPRESSIVE DISORDER IN PARTIAL REMISSION (HCC): ICD-10-CM

## 2019-06-19 PROCEDURE — 1036F TOBACCO NON-USER: CPT | Performed by: FAMILY MEDICINE

## 2019-06-19 PROCEDURE — 99213 OFFICE O/P EST LOW 20 MIN: CPT | Performed by: FAMILY MEDICINE

## 2019-06-19 PROCEDURE — 1123F ACP DISCUSS/DSCN MKR DOCD: CPT | Performed by: FAMILY MEDICINE

## 2019-06-19 PROCEDURE — 1090F PRES/ABSN URINE INCON ASSESS: CPT | Performed by: FAMILY MEDICINE

## 2019-06-19 PROCEDURE — 3017F COLORECTAL CA SCREEN DOC REV: CPT | Performed by: FAMILY MEDICINE

## 2019-06-19 PROCEDURE — 4040F PNEUMOC VAC/ADMIN/RCVD: CPT | Performed by: FAMILY MEDICINE

## 2019-06-19 PROCEDURE — G8417 CALC BMI ABV UP PARAM F/U: HCPCS | Performed by: FAMILY MEDICINE

## 2019-06-19 PROCEDURE — G8400 PT W/DXA NO RESULTS DOC: HCPCS | Performed by: FAMILY MEDICINE

## 2019-06-19 PROCEDURE — G8427 DOCREV CUR MEDS BY ELIG CLIN: HCPCS | Performed by: FAMILY MEDICINE

## 2019-06-19 RX ORDER — PANTOPRAZOLE SODIUM 40 MG/1
TABLET, DELAYED RELEASE ORAL
COMMUNITY
Start: 2019-06-19 | End: 2020-12-16 | Stop reason: SDUPTHER

## 2019-06-19 ASSESSMENT — ENCOUNTER SYMPTOMS
SINUS PRESSURE: 0
SHORTNESS OF BREATH: 0
CONSTIPATION: 0

## 2019-06-19 ASSESSMENT — PATIENT HEALTH QUESTIONNAIRE - PHQ9
2. FEELING DOWN, DEPRESSED OR HOPELESS: 0
1. LITTLE INTEREST OR PLEASURE IN DOING THINGS: 0
SUM OF ALL RESPONSES TO PHQ QUESTIONS 1-9: 0
SUM OF ALL RESPONSES TO PHQ QUESTIONS 1-9: 0
SUM OF ALL RESPONSES TO PHQ9 QUESTIONS 1 & 2: 0

## 2019-06-19 NOTE — TELEPHONE ENCOUNTER
DEXA scan scheduled at [facility] for Tues July 16th at 10am. Pt informed and orders faxed.

## 2019-06-19 NOTE — PROGRESS NOTES
Subjective:      Patient ID: Hardik Torres is a 79 y.o. female. HPI  1. She continues with pain management  2. There is family history of dementia and she worries about that  3. She states she had the flu and pneumonia shots in 10/2018 but we have no record. Review of Systems   Constitutional: Negative for fatigue. HENT: Negative for sinus pressure. Eyes: Negative for visual disturbance. Respiratory: Negative for shortness of breath. Cardiovascular: Negative for chest pain. Gastrointestinal: Negative for constipation. Genitourinary: Negative. Musculoskeletal: Negative for arthralgias. Skin: Negative for rash. Neurological: Negative for headaches. The patient's medications, allergies, past medical problems, surgical, social, and family histories were reviewed and updated as needed. Objective:   Physical Exam   Constitutional: She is oriented to person, place, and time. She appears well-developed and well-nourished. No distress. HENT:   Head: Normocephalic and atraumatic. Eyes: Conjunctivae are normal. No scleral icterus. Neck: No tracheal deviation present. Cardiovascular: Normal rate, regular rhythm and normal heart sounds. Pulmonary/Chest: Effort normal and breath sounds normal.   Musculoskeletal: She exhibits no edema. Neurological: She is alert and oriented to person, place, and time. Skin: Skin is warm and dry. Psychiatric: She has a normal mood and affect. Her behavior is normal.   Blood pressure 132/70, pulse 64, resp. rate 14, height 5' 2\" (1.575 m), weight 184 lb 4 oz (83.6 kg), not currently breastfeeding. MOCA done today    Assessment:       Diagnosis Orders   1. Essential hypertension     2.  Post-menopausal  JACKELINE Dexa Bone Density Scan                Plan:      Do the fasting labs in December   Get the bone density test done  See me in October        Florian Seip, MD

## 2019-07-23 ENCOUNTER — OFFICE VISIT (OUTPATIENT)
Dept: PHYSICAL MEDICINE AND REHAB | Age: 69
End: 2019-07-23
Payer: MEDICARE

## 2019-07-23 VITALS
HEIGHT: 62 IN | SYSTOLIC BLOOD PRESSURE: 122 MMHG | BODY MASS INDEX: 33.92 KG/M2 | HEART RATE: 56 BPM | WEIGHT: 184.3 LBS | DIASTOLIC BLOOD PRESSURE: 68 MMHG

## 2019-07-23 DIAGNOSIS — M51.36 BULGING LUMBAR DISC: Primary | ICD-10-CM

## 2019-07-23 DIAGNOSIS — M46.1 SI (SACROILIAC) JOINT INFLAMMATION (HCC): ICD-10-CM

## 2019-07-23 DIAGNOSIS — M47.816 SPONDYLOSIS OF LUMBAR REGION WITHOUT MYELOPATHY OR RADICULOPATHY: ICD-10-CM

## 2019-07-23 DIAGNOSIS — G89.4 CHRONIC PAIN SYNDROME: ICD-10-CM

## 2019-07-23 DIAGNOSIS — M48.062 LUMBAR STENOSIS WITH NEUROGENIC CLAUDICATION: ICD-10-CM

## 2019-07-23 PROCEDURE — 99214 OFFICE O/P EST MOD 30 MIN: CPT | Performed by: NURSE PRACTITIONER

## 2019-07-23 ASSESSMENT — ENCOUNTER SYMPTOMS
BACK PAIN: 1
COLOR CHANGE: 0

## 2019-07-26 ENCOUNTER — TELEPHONE (OUTPATIENT)
Dept: FAMILY MEDICINE CLINIC | Age: 69
End: 2019-07-26

## 2019-07-29 ENCOUNTER — TELEPHONE (OUTPATIENT)
Dept: FAMILY MEDICINE CLINIC | Age: 69
End: 2019-07-29

## 2019-07-29 RX ORDER — RALOXIFENE HYDROCHLORIDE 60 MG/1
60 TABLET, FILM COATED ORAL DAILY
Qty: 30 TABLET | Refills: 11 | Status: SHIPPED | OUTPATIENT
Start: 2019-07-29 | End: 2019-08-07 | Stop reason: SDUPTHER

## 2019-07-29 NOTE — TELEPHONE ENCOUNTER
----- Message from Aroldo Burt MD sent at 7/26/2019  7:51 PM EDT -----  Let her know the bone density is down some from before and she should continue the calcium to equal 1500 mg daily and the vit D. It would be good to begin some generic evista to improve the bone strength. Check the DEXA in 2 years and the follow up vit D in December as arranged. Which pharmacy if she agrees to the evista?

## 2019-08-01 ENCOUNTER — ANESTHESIA (OUTPATIENT)
Dept: OPERATING ROOM | Age: 69
End: 2019-08-01
Payer: MEDICARE

## 2019-08-01 ENCOUNTER — HOSPITAL ENCOUNTER (OUTPATIENT)
Age: 69
Setting detail: OUTPATIENT SURGERY
Discharge: HOME | End: 2019-08-01
Attending: PAIN MEDICINE | Admitting: PAIN MEDICINE
Payer: MEDICARE

## 2019-08-01 ENCOUNTER — APPOINTMENT (OUTPATIENT)
Dept: GENERAL RADIOLOGY | Age: 69
End: 2019-08-01
Attending: PAIN MEDICINE
Payer: MEDICARE

## 2019-08-01 ENCOUNTER — ANESTHESIA EVENT (OUTPATIENT)
Dept: OPERATING ROOM | Age: 69
End: 2019-08-01
Payer: MEDICARE

## 2019-08-01 VITALS
OXYGEN SATURATION: 94 % | DIASTOLIC BLOOD PRESSURE: 67 MMHG | RESPIRATION RATE: 15 BRPM | SYSTOLIC BLOOD PRESSURE: 136 MMHG

## 2019-08-01 VITALS
DIASTOLIC BLOOD PRESSURE: 60 MMHG | HEART RATE: 75 BPM | TEMPERATURE: 98.3 F | SYSTOLIC BLOOD PRESSURE: 112 MMHG | OXYGEN SATURATION: 95 % | RESPIRATION RATE: 16 BRPM

## 2019-08-01 PROCEDURE — 3600000052 HC PAIN LEVEL 2 BASE: Performed by: PAIN MEDICINE

## 2019-08-01 PROCEDURE — 3209999900 FLUORO FOR SURGICAL PROCEDURES

## 2019-08-01 PROCEDURE — 3700000000 HC ANESTHESIA ATTENDED CARE: Performed by: PAIN MEDICINE

## 2019-08-01 PROCEDURE — 2580000003 HC RX 258: Performed by: PAIN MEDICINE

## 2019-08-01 PROCEDURE — 6360000002 HC RX W HCPCS: Performed by: PAIN MEDICINE

## 2019-08-01 PROCEDURE — 2500000003 HC RX 250 WO HCPCS: Performed by: PAIN MEDICINE

## 2019-08-01 PROCEDURE — 7100000011 HC PHASE II RECOVERY - ADDTL 15 MIN: Performed by: PAIN MEDICINE

## 2019-08-01 PROCEDURE — 7100000010 HC PHASE II RECOVERY - FIRST 15 MIN: Performed by: PAIN MEDICINE

## 2019-08-01 PROCEDURE — 2709999900 HC NON-CHARGEABLE SUPPLY: Performed by: PAIN MEDICINE

## 2019-08-01 PROCEDURE — 62323 NJX INTERLAMINAR LMBR/SAC: CPT | Performed by: PAIN MEDICINE

## 2019-08-01 PROCEDURE — 6360000002 HC RX W HCPCS: Performed by: ANESTHESIOLOGY

## 2019-08-01 PROCEDURE — 6360000004 HC RX CONTRAST MEDICATION: Performed by: PAIN MEDICINE

## 2019-08-01 RX ORDER — LIDOCAINE HYDROCHLORIDE 10 MG/ML
INJECTION, SOLUTION INFILTRATION; PERINEURAL PRN
Status: DISCONTINUED | OUTPATIENT
Start: 2019-08-01 | End: 2019-08-01 | Stop reason: ALTCHOICE

## 2019-08-01 RX ORDER — DEXAMETHASONE SODIUM PHOSPHATE 4 MG/ML
INJECTION, SOLUTION INTRA-ARTICULAR; INTRALESIONAL; INTRAMUSCULAR; INTRAVENOUS; SOFT TISSUE PRN
Status: DISCONTINUED | OUTPATIENT
Start: 2019-08-01 | End: 2019-08-01 | Stop reason: ALTCHOICE

## 2019-08-01 RX ORDER — 0.9 % SODIUM CHLORIDE 0.9 %
VIAL (ML) INJECTION PRN
Status: DISCONTINUED | OUTPATIENT
Start: 2019-08-01 | End: 2019-08-01 | Stop reason: ALTCHOICE

## 2019-08-01 RX ADMIN — PROPOFOL 50 MG: 10 INJECTION, EMULSION INTRAVENOUS at 14:15

## 2019-08-01 RX ADMIN — PROPOFOL 20 MG: 10 INJECTION, EMULSION INTRAVENOUS at 14:18

## 2019-08-01 ASSESSMENT — PAIN SCALES - GENERAL: PAINLEVEL_OUTOF10: 0

## 2019-08-01 ASSESSMENT — PULMONARY FUNCTION TESTS
PIF_VALUE: 0

## 2019-08-07 RX ORDER — RALOXIFENE HYDROCHLORIDE 60 MG/1
60 TABLET, FILM COATED ORAL DAILY
Qty: 90 TABLET | Refills: 3 | Status: ON HOLD | OUTPATIENT
Start: 2019-08-07 | End: 2020-06-15 | Stop reason: ALTCHOICE

## 2019-08-22 ENCOUNTER — OFFICE VISIT (OUTPATIENT)
Dept: PHYSICAL MEDICINE AND REHAB | Age: 69
End: 2019-08-22
Payer: MEDICARE

## 2019-08-22 VITALS
HEIGHT: 62 IN | DIASTOLIC BLOOD PRESSURE: 80 MMHG | HEART RATE: 61 BPM | SYSTOLIC BLOOD PRESSURE: 134 MMHG | BODY MASS INDEX: 33.92 KG/M2 | WEIGHT: 184.3 LBS

## 2019-08-22 DIAGNOSIS — M48.062 LUMBAR STENOSIS WITH NEUROGENIC CLAUDICATION: Primary | ICD-10-CM

## 2019-08-22 DIAGNOSIS — M47.816 SPONDYLOSIS OF LUMBAR REGION WITHOUT MYELOPATHY OR RADICULOPATHY: ICD-10-CM

## 2019-08-22 DIAGNOSIS — M51.36 BULGING LUMBAR DISC: ICD-10-CM

## 2019-08-22 DIAGNOSIS — G89.4 CHRONIC PAIN SYNDROME: ICD-10-CM

## 2019-08-22 PROCEDURE — 99213 OFFICE O/P EST LOW 20 MIN: CPT | Performed by: NURSE PRACTITIONER

## 2019-08-22 RX ORDER — ACETAMINOPHEN AND CODEINE PHOSPHATE 300; 30 MG/1; MG/1
1 TABLET ORAL EVERY 8 HOURS PRN
Qty: 42 TABLET | Refills: 0 | Status: SHIPPED | OUTPATIENT
Start: 2019-08-22 | End: 2019-09-05

## 2019-08-22 ASSESSMENT — ENCOUNTER SYMPTOMS
COLOR CHANGE: 0
BACK PAIN: 1

## 2019-08-22 NOTE — PROGRESS NOTES
(PROTONIX) 40 MG tablet, , Disp: , Rfl:     pantoprazole sodium (PROTONIX) 40 MG PACK packet, Take 40 mg by mouth every morning (before breakfast), Disp: , Rfl:     ibuprofen (ADVIL;MOTRIN) 200 MG tablet, Take 200 mg by mouth every 6 hours as needed for Pain, Disp: , Rfl:     amLODIPine (NORVASC) 2.5 MG tablet, Take 1 tablet by mouth daily, Disp: 90 tablet, Rfl: 3    simvastatin (ZOCOR) 20 MG tablet, TAKE 1 TABLET BY MOUTH EVERY DAY, Disp: 90 tablet, Rfl: 3    lisinopril (PRINIVIL;ZESTRIL) 20 MG tablet, TAKE 1 TABLET BY MOUTH DAILY. , Disp: 90 tablet, Rfl: 3    Cholecalciferol (VITAMIN D3) 5000 UNITS CAPS, Take 1 capsule by mouth daily. , Disp: , Rfl:     aspirin 81 MG tablet, Take 81 mg by mouth daily. , Disp: , Rfl:     calcium carbonate 600 MG TABS tablet, Take 1 tablet by mouth 2 times daily. , Disp: , Rfl:     Subjective:      Review of Systems   Constitutional: Positive for activity change. Negative for chills, diaphoresis, fatigue and fever. Genitourinary: Negative for difficulty urinating, frequency and urgency. Musculoskeletal: Positive for arthralgias, back pain and myalgias. Negative for gait problem, neck pain and neck stiffness. Skin: Negative for color change, rash and wound. Allergic/Immunologic: Negative for environmental allergies and food allergies. Neurological: Negative for dizziness, seizures, light-headedness, numbness and headaches. Hematological: Does not bruise/bleed easily. Psychiatric/Behavioral: Negative for sleep disturbance. The patient is not nervous/anxious. Objective:     Vitals:    08/22/19 0850   BP: 134/80   Site: Right Upper Arm   Position: Sitting   Cuff Size: Medium Adult   Pulse: 61   Weight: 184 lb 4.9 oz (83.6 kg)   Height: 5' 2.01\" (1.575 m)       Physical Exam   Constitutional: She is oriented to person, place, and time. She appears well-developed and well-nourished. No distress. HENT:   Head: Normocephalic and atraumatic.  Not macrocephalic

## 2019-08-30 ENCOUNTER — TELEPHONE (OUTPATIENT)
Dept: FAMILY MEDICINE CLINIC | Age: 69
End: 2019-08-30

## 2019-09-05 ENCOUNTER — PREP FOR PROCEDURE (OUTPATIENT)
Dept: PHYSICAL MEDICINE AND REHAB | Age: 69
End: 2019-09-05

## 2019-09-05 NOTE — H&P
exhibits no discharge. Neck: No tracheal deviation present. Pulmonary/Chest: Effort normal. No respiratory distress. Musculoskeletal: She exhibits tenderness. She exhibits no edema. Lumbar back: She exhibits decreased range of motion, tenderness, pain and spasm. Back:    Neurological: She is alert and oriented to person, place, and time. No cranial nerve deficit. Skin: Skin is warm and dry. No rash noted. She is not diaphoretic. No pallor. Psychiatric: She has a normal mood and affect. Her speech is normal and behavior is normal. Judgment and thought content normal. She is not actively hallucinating. Cognition and memory are normal. She is attentive. Vitals reviewed.        Assessment:      1. Lumbar stenosis with neurogenic claudication    2. Spondylosis of lumbar region without myelopathy or radiculopathy    3. Chronic pain syndrome    4.  Bulging lumbar disc           Plan:  LESI L4 #2       SHERRY Lee - CNP  9/5/2019

## 2019-09-06 ENCOUNTER — TELEPHONE (OUTPATIENT)
Dept: PHYSICAL MEDICINE AND REHAB | Age: 69
End: 2019-09-06

## 2019-09-19 ENCOUNTER — HOSPITAL ENCOUNTER (OUTPATIENT)
Age: 69
Setting detail: OUTPATIENT SURGERY
Discharge: HOME | End: 2019-09-19
Attending: PAIN MEDICINE | Admitting: PAIN MEDICINE
Payer: MEDICARE

## 2019-09-19 ENCOUNTER — ANESTHESIA EVENT (OUTPATIENT)
Dept: OPERATING ROOM | Age: 69
End: 2019-09-19
Payer: MEDICARE

## 2019-09-19 ENCOUNTER — ANESTHESIA (OUTPATIENT)
Dept: OPERATING ROOM | Age: 69
End: 2019-09-19
Payer: MEDICARE

## 2019-09-19 ENCOUNTER — APPOINTMENT (OUTPATIENT)
Dept: GENERAL RADIOLOGY | Age: 69
End: 2019-09-19
Attending: PAIN MEDICINE
Payer: MEDICARE

## 2019-09-19 VITALS
DIASTOLIC BLOOD PRESSURE: 60 MMHG | SYSTOLIC BLOOD PRESSURE: 125 MMHG | RESPIRATION RATE: 11 BRPM | OXYGEN SATURATION: 99 %

## 2019-09-19 VITALS
OXYGEN SATURATION: 96 % | DIASTOLIC BLOOD PRESSURE: 58 MMHG | WEIGHT: 187 LBS | HEIGHT: 62 IN | HEART RATE: 75 BPM | BODY MASS INDEX: 34.41 KG/M2 | RESPIRATION RATE: 15 BRPM | TEMPERATURE: 97 F | SYSTOLIC BLOOD PRESSURE: 123 MMHG

## 2019-09-19 PROCEDURE — 3700000000 HC ANESTHESIA ATTENDED CARE: Performed by: PAIN MEDICINE

## 2019-09-19 PROCEDURE — 3209999900 FLUORO FOR SURGICAL PROCEDURES

## 2019-09-19 PROCEDURE — 7100000011 HC PHASE II RECOVERY - ADDTL 15 MIN: Performed by: PAIN MEDICINE

## 2019-09-19 PROCEDURE — 2500000003 HC RX 250 WO HCPCS: Performed by: PAIN MEDICINE

## 2019-09-19 PROCEDURE — 3600000052 HC PAIN LEVEL 2 BASE: Performed by: PAIN MEDICINE

## 2019-09-19 PROCEDURE — 2500000003 HC RX 250 WO HCPCS: Performed by: NURSE ANESTHETIST, CERTIFIED REGISTERED

## 2019-09-19 PROCEDURE — 6360000004 HC RX CONTRAST MEDICATION: Performed by: PAIN MEDICINE

## 2019-09-19 PROCEDURE — 2580000003 HC RX 258: Performed by: PAIN MEDICINE

## 2019-09-19 PROCEDURE — 62323 NJX INTERLAMINAR LMBR/SAC: CPT | Performed by: PAIN MEDICINE

## 2019-09-19 PROCEDURE — 6360000002 HC RX W HCPCS: Performed by: PAIN MEDICINE

## 2019-09-19 PROCEDURE — 2709999900 HC NON-CHARGEABLE SUPPLY: Performed by: PAIN MEDICINE

## 2019-09-19 PROCEDURE — 6360000002 HC RX W HCPCS: Performed by: NURSE ANESTHETIST, CERTIFIED REGISTERED

## 2019-09-19 PROCEDURE — 7100000010 HC PHASE II RECOVERY - FIRST 15 MIN: Performed by: PAIN MEDICINE

## 2019-09-19 RX ORDER — LIDOCAINE HYDROCHLORIDE 10 MG/ML
INJECTION, SOLUTION INFILTRATION; PERINEURAL PRN
Status: DISCONTINUED | OUTPATIENT
Start: 2019-09-19 | End: 2019-09-19 | Stop reason: SDUPTHER

## 2019-09-19 RX ORDER — LIDOCAINE HYDROCHLORIDE 10 MG/ML
INJECTION, SOLUTION INFILTRATION; PERINEURAL PRN
Status: DISCONTINUED | OUTPATIENT
Start: 2019-09-19 | End: 2019-09-19 | Stop reason: ALTCHOICE

## 2019-09-19 RX ORDER — PROPOFOL 10 MG/ML
INJECTION, EMULSION INTRAVENOUS PRN
Status: DISCONTINUED | OUTPATIENT
Start: 2019-09-19 | End: 2019-09-19 | Stop reason: SDUPTHER

## 2019-09-19 RX ORDER — 0.9 % SODIUM CHLORIDE 0.9 %
VIAL (ML) INJECTION PRN
Status: DISCONTINUED | OUTPATIENT
Start: 2019-09-19 | End: 2019-09-19 | Stop reason: ALTCHOICE

## 2019-09-19 RX ORDER — DEXAMETHASONE SODIUM PHOSPHATE 4 MG/ML
INJECTION, SOLUTION INTRA-ARTICULAR; INTRALESIONAL; INTRAMUSCULAR; INTRAVENOUS; SOFT TISSUE PRN
Status: DISCONTINUED | OUTPATIENT
Start: 2019-09-19 | End: 2019-09-19 | Stop reason: ALTCHOICE

## 2019-09-19 RX ADMIN — PROPOFOL 100 MG: 10 INJECTION, EMULSION INTRAVENOUS at 14:32

## 2019-09-19 RX ADMIN — LIDOCAINE HYDROCHLORIDE 20 MG: 10 INJECTION, SOLUTION INFILTRATION; PERINEURAL at 14:32

## 2019-09-19 ASSESSMENT — PULMONARY FUNCTION TESTS
PIF_VALUE: 0

## 2019-09-19 ASSESSMENT — PAIN DESCRIPTION - PAIN TYPE: TYPE: SURGICAL PAIN

## 2019-09-19 ASSESSMENT — PAIN - FUNCTIONAL ASSESSMENT: PAIN_FUNCTIONAL_ASSESSMENT: 0-10

## 2019-09-19 ASSESSMENT — PAIN SCALES - GENERAL: PAINLEVEL_OUTOF10: 0

## 2019-09-19 NOTE — ANESTHESIA POSTPROCEDURE EVALUATION
Department of Anesthesiology  Postprocedure Note    Patient: Kellie Emerson  MRN: [id number]  YOB: 1950  Date of evaluation: 9/19/2019  Time:  2:43 PM     Procedure Summary     Date:  09/19/19 Room / Location:  [facility] OR [address] [place] [address]    Anesthesia Start:  1223 Anesthesia Stop:  9650    Procedure:  LESI L4 or other level #2 (Bilateral Neck) Diagnosis:  (lumbar spinal stenosis)    Surgeon:  Musa Carpenter MD Responsible Provider:  Lisa Otero MD    Anesthesia Type:  MAC ASA Status:  3          Anesthesia Type: MAC    Eloy Phase I:      Eloy Phase II:      Last vitals: Reviewed and per EMR flowsheets. Anesthesia Post Evaluation    Patient location during evaluation: PACU  Patient participation: complete - patient participated  Level of consciousness: awake and alert  Airway patency: patent  Nausea & Vomiting: no nausea and no vomiting  Complications: no  Cardiovascular status: hemodynamically stable  Respiratory status: acceptable  Hydration status: euvolemic      [facility]  POST-ANESTHESIA NOTE       Name:  Kellie Emerson                                         Age:  79 y.o.   MRN:  [id number]      Last Vitals:  /63   Pulse 72   Temp 96.8 °F (36 °C) (Temporal)   Resp 16   Ht 5' 2\" (1.575 m)   Wt 187 lb (84.8 kg)   SpO2 98%   BMI 34.20 kg/m²   Patient Vitals for the past 4 hrs:   BP Temp Temp src Pulse Resp SpO2 Height Weight   09/19/19 1334 133/63 96.8 °F (36 °C) Temporal 72 16 98 % 5' 2\" (1.575 m) 187 lb (84.8 kg)       Level of Consciousness:  Awake    Respiratory:  Stable    Oxygen Saturation:  Stable    Cardiovascular:  Stable    Hydration:  Adequate    PONV:  Stable    Post-op Pain:  Adequate analgesia    Post-op Assessment:  No apparent anesthetic complications    Additional Follow-Up / Treatment / Comment:  None    Neyda Lombardi MD  September 19, 2019   2:43 PM

## 2019-09-29 ASSESSMENT — ENCOUNTER SYMPTOMS
SINUS PRESSURE: 0
SHORTNESS OF BREATH: 0
CONSTIPATION: 0

## 2019-09-30 ENCOUNTER — OFFICE VISIT (OUTPATIENT)
Dept: FAMILY MEDICINE CLINIC | Age: 69
End: 2019-09-30

## 2019-09-30 VITALS
SYSTOLIC BLOOD PRESSURE: 108 MMHG | RESPIRATION RATE: 16 BRPM | DIASTOLIC BLOOD PRESSURE: 60 MMHG | BODY MASS INDEX: 34.67 KG/M2 | HEART RATE: 76 BPM | WEIGHT: 188.38 LBS | HEIGHT: 62 IN

## 2019-09-30 DIAGNOSIS — Z23 NEED FOR INFLUENZA VACCINATION: ICD-10-CM

## 2019-09-30 DIAGNOSIS — E78.00 PURE HYPERCHOLESTEROLEMIA: ICD-10-CM

## 2019-09-30 DIAGNOSIS — I10 ESSENTIAL HYPERTENSION: Primary | ICD-10-CM

## 2019-09-30 PROCEDURE — G8427 DOCREV CUR MEDS BY ELIG CLIN: HCPCS | Performed by: FAMILY MEDICINE

## 2019-09-30 PROCEDURE — 4040F PNEUMOC VAC/ADMIN/RCVD: CPT | Performed by: FAMILY MEDICINE

## 2019-09-30 PROCEDURE — 1090F PRES/ABSN URINE INCON ASSESS: CPT | Performed by: FAMILY MEDICINE

## 2019-09-30 PROCEDURE — 3017F COLORECTAL CA SCREEN DOC REV: CPT | Performed by: FAMILY MEDICINE

## 2019-09-30 PROCEDURE — G8417 CALC BMI ABV UP PARAM F/U: HCPCS | Performed by: FAMILY MEDICINE

## 2019-09-30 PROCEDURE — 1036F TOBACCO NON-USER: CPT | Performed by: FAMILY MEDICINE

## 2019-09-30 PROCEDURE — G0008 ADMIN INFLUENZA VIRUS VAC: HCPCS | Performed by: FAMILY MEDICINE

## 2019-09-30 PROCEDURE — 90688 IIV4 VACCINE SPLT 0.5 ML IM: CPT | Performed by: FAMILY MEDICINE

## 2019-09-30 PROCEDURE — G8400 PT W/DXA NO RESULTS DOC: HCPCS | Performed by: FAMILY MEDICINE

## 2019-09-30 PROCEDURE — 99214 OFFICE O/P EST MOD 30 MIN: CPT | Performed by: FAMILY MEDICINE

## 2019-09-30 PROCEDURE — 1123F ACP DISCUSS/DSCN MKR DOCD: CPT | Performed by: FAMILY MEDICINE

## 2019-09-30 ASSESSMENT — PATIENT HEALTH QUESTIONNAIRE - PHQ9
SUM OF ALL RESPONSES TO PHQ QUESTIONS 1-9: 0
1. LITTLE INTEREST OR PLEASURE IN DOING THINGS: 0
SUM OF ALL RESPONSES TO PHQ9 QUESTIONS 1 & 2: 0
SUM OF ALL RESPONSES TO PHQ QUESTIONS 1-9: 0
2. FEELING DOWN, DEPRESSED OR HOPELESS: 0

## 2019-10-03 ENCOUNTER — OFFICE VISIT (OUTPATIENT)
Dept: PHYSICAL MEDICINE AND REHAB | Age: 69
End: 2019-10-03
Payer: MEDICARE

## 2019-10-03 VITALS
WEIGHT: 187.39 LBS | SYSTOLIC BLOOD PRESSURE: 110 MMHG | BODY MASS INDEX: 34.48 KG/M2 | HEART RATE: 80 BPM | HEIGHT: 62 IN | DIASTOLIC BLOOD PRESSURE: 70 MMHG

## 2019-10-03 DIAGNOSIS — M51.36 BULGING LUMBAR DISC: ICD-10-CM

## 2019-10-03 DIAGNOSIS — M47.816 SPONDYLOSIS OF LUMBAR REGION WITHOUT MYELOPATHY OR RADICULOPATHY: Primary | ICD-10-CM

## 2019-10-03 DIAGNOSIS — M54.50 LUMBAR PAIN: ICD-10-CM

## 2019-10-03 DIAGNOSIS — M48.062 LUMBAR STENOSIS WITH NEUROGENIC CLAUDICATION: ICD-10-CM

## 2019-10-03 PROCEDURE — G8417 CALC BMI ABV UP PARAM F/U: HCPCS | Performed by: NURSE PRACTITIONER

## 2019-10-03 PROCEDURE — 4040F PNEUMOC VAC/ADMIN/RCVD: CPT | Performed by: NURSE PRACTITIONER

## 2019-10-03 PROCEDURE — 1036F TOBACCO NON-USER: CPT | Performed by: NURSE PRACTITIONER

## 2019-10-03 PROCEDURE — 3017F COLORECTAL CA SCREEN DOC REV: CPT | Performed by: NURSE PRACTITIONER

## 2019-10-03 PROCEDURE — 1123F ACP DISCUSS/DSCN MKR DOCD: CPT | Performed by: NURSE PRACTITIONER

## 2019-10-03 PROCEDURE — 99214 OFFICE O/P EST MOD 30 MIN: CPT | Performed by: NURSE PRACTITIONER

## 2019-10-03 PROCEDURE — G8400 PT W/DXA NO RESULTS DOC: HCPCS | Performed by: NURSE PRACTITIONER

## 2019-10-03 PROCEDURE — 1090F PRES/ABSN URINE INCON ASSESS: CPT | Performed by: NURSE PRACTITIONER

## 2019-10-03 PROCEDURE — G8427 DOCREV CUR MEDS BY ELIG CLIN: HCPCS | Performed by: NURSE PRACTITIONER

## 2019-10-03 PROCEDURE — G8482 FLU IMMUNIZE ORDER/ADMIN: HCPCS | Performed by: NURSE PRACTITIONER

## 2019-10-03 RX ORDER — TIZANIDINE 2 MG/1
2 TABLET ORAL EVERY 8 HOURS PRN
Qty: 42 TABLET | Refills: 0 | Status: SHIPPED | OUTPATIENT
Start: 2019-10-03 | End: 2020-05-07

## 2019-10-03 ASSESSMENT — ENCOUNTER SYMPTOMS
COLOR CHANGE: 0
BACK PAIN: 1

## 2019-10-30 ENCOUNTER — NURSE ONLY (OUTPATIENT)
Dept: FAMILY MEDICINE CLINIC | Age: 69
End: 2019-10-30

## 2019-10-30 DIAGNOSIS — Z23 NEED FOR PNEUMOCOCCAL VACCINATION: Primary | ICD-10-CM

## 2019-10-30 PROCEDURE — 90732 PPSV23 VACC 2 YRS+ SUBQ/IM: CPT | Performed by: FAMILY MEDICINE

## 2019-10-30 PROCEDURE — G0009 ADMIN PNEUMOCOCCAL VACCINE: HCPCS | Performed by: FAMILY MEDICINE

## 2019-11-26 ENCOUNTER — PREP FOR PROCEDURE (OUTPATIENT)
Dept: PHYSICAL MEDICINE AND REHAB | Age: 69
End: 2019-11-26

## 2019-12-02 ENCOUNTER — ANESTHESIA (OUTPATIENT)
Dept: OPERATING ROOM | Age: 69
End: 2019-12-02
Payer: MEDICARE

## 2019-12-02 ENCOUNTER — HOSPITAL ENCOUNTER (OUTPATIENT)
Age: 69
Setting detail: OUTPATIENT SURGERY
Discharge: HOME | End: 2019-12-02
Attending: PAIN MEDICINE | Admitting: PAIN MEDICINE
Payer: MEDICARE

## 2019-12-02 ENCOUNTER — ANESTHESIA EVENT (OUTPATIENT)
Dept: OPERATING ROOM | Age: 69
End: 2019-12-02
Payer: MEDICARE

## 2019-12-02 ENCOUNTER — APPOINTMENT (OUTPATIENT)
Dept: GENERAL RADIOLOGY | Age: 69
End: 2019-12-02
Attending: PAIN MEDICINE
Payer: MEDICARE

## 2019-12-02 VITALS
TEMPERATURE: 97.6 F | WEIGHT: 190 LBS | BODY MASS INDEX: 34.96 KG/M2 | OXYGEN SATURATION: 94 % | SYSTOLIC BLOOD PRESSURE: 96 MMHG | RESPIRATION RATE: 16 BRPM | DIASTOLIC BLOOD PRESSURE: 49 MMHG | HEIGHT: 62 IN | HEART RATE: 71 BPM

## 2019-12-02 VITALS
RESPIRATION RATE: 12 BRPM | DIASTOLIC BLOOD PRESSURE: 59 MMHG | SYSTOLIC BLOOD PRESSURE: 112 MMHG | OXYGEN SATURATION: 97 %

## 2019-12-02 PROCEDURE — 7100000010 HC PHASE II RECOVERY - FIRST 15 MIN: Performed by: PAIN MEDICINE

## 2019-12-02 PROCEDURE — 2500000003 HC RX 250 WO HCPCS: Performed by: PAIN MEDICINE

## 2019-12-02 PROCEDURE — 3700000000 HC ANESTHESIA ATTENDED CARE: Performed by: PAIN MEDICINE

## 2019-12-02 PROCEDURE — 2580000003 HC RX 258: Performed by: NURSE ANESTHETIST, CERTIFIED REGISTERED

## 2019-12-02 PROCEDURE — 2709999900 HC NON-CHARGEABLE SUPPLY: Performed by: PAIN MEDICINE

## 2019-12-02 PROCEDURE — 3209999900 FLUORO FOR SURGICAL PROCEDURES

## 2019-12-02 PROCEDURE — 6360000002 HC RX W HCPCS: Performed by: PAIN MEDICINE

## 2019-12-02 PROCEDURE — 6360000002 HC RX W HCPCS: Performed by: NURSE ANESTHETIST, CERTIFIED REGISTERED

## 2019-12-02 PROCEDURE — 3600000057 HC PAIN LEVEL 4 ADDL 15 MIN: Performed by: PAIN MEDICINE

## 2019-12-02 PROCEDURE — 3600000056 HC PAIN LEVEL 4 BASE: Performed by: PAIN MEDICINE

## 2019-12-02 PROCEDURE — 3700000001 HC ADD 15 MINUTES (ANESTHESIA): Performed by: PAIN MEDICINE

## 2019-12-02 PROCEDURE — 7100000011 HC PHASE II RECOVERY - ADDTL 15 MIN: Performed by: PAIN MEDICINE

## 2019-12-02 PROCEDURE — 64635 DESTROY LUMB/SAC FACET JNT: CPT | Performed by: PAIN MEDICINE

## 2019-12-02 PROCEDURE — 64636 DESTROY L/S FACET JNT ADDL: CPT | Performed by: PAIN MEDICINE

## 2019-12-02 RX ORDER — PROPOFOL 10 MG/ML
INJECTION, EMULSION INTRAVENOUS PRN
Status: DISCONTINUED | OUTPATIENT
Start: 2019-12-02 | End: 2019-12-02 | Stop reason: SDUPTHER

## 2019-12-02 RX ORDER — FENTANYL CITRATE 50 UG/ML
INJECTION, SOLUTION INTRAMUSCULAR; INTRAVENOUS PRN
Status: DISCONTINUED | OUTPATIENT
Start: 2019-12-02 | End: 2019-12-02 | Stop reason: SDUPTHER

## 2019-12-02 RX ORDER — ROPIVACAINE HYDROCHLORIDE 2 MG/ML
INJECTION, SOLUTION EPIDURAL; INFILTRATION; PERINEURAL PRN
Status: DISCONTINUED | OUTPATIENT
Start: 2019-12-02 | End: 2019-12-02 | Stop reason: ALTCHOICE

## 2019-12-02 RX ORDER — LIDOCAINE HYDROCHLORIDE 10 MG/ML
INJECTION, SOLUTION EPIDURAL; INFILTRATION; INTRACAUDAL; PERINEURAL PRN
Status: DISCONTINUED | OUTPATIENT
Start: 2019-12-02 | End: 2019-12-02 | Stop reason: ALTCHOICE

## 2019-12-02 RX ORDER — METHYLPREDNISOLONE ACETATE 80 MG/ML
INJECTION, SUSPENSION INTRA-ARTICULAR; INTRALESIONAL; INTRAMUSCULAR; SOFT TISSUE PRN
Status: DISCONTINUED | OUTPATIENT
Start: 2019-12-02 | End: 2019-12-02 | Stop reason: ALTCHOICE

## 2019-12-02 RX ORDER — SODIUM CHLORIDE 9 MG/ML
INJECTION INTRAVENOUS PRN
Status: DISCONTINUED | OUTPATIENT
Start: 2019-12-02 | End: 2019-12-02 | Stop reason: SDUPTHER

## 2019-12-02 RX ADMIN — SODIUM CHLORIDE 5 ML: 9 INJECTION, SOLUTION INTRAMUSCULAR; INTRAVENOUS; SUBCUTANEOUS at 09:11

## 2019-12-02 RX ADMIN — SODIUM CHLORIDE 5 ML: 9 INJECTION, SOLUTION INTRAMUSCULAR; INTRAVENOUS; SUBCUTANEOUS at 09:12

## 2019-12-02 RX ADMIN — PROPOFOL 50 MG: 10 INJECTION, EMULSION INTRAVENOUS at 09:19

## 2019-12-02 RX ADMIN — FENTANYL CITRATE 50 MCG: 50 INJECTION INTRAMUSCULAR; INTRAVENOUS at 09:11

## 2019-12-02 RX ADMIN — SODIUM CHLORIDE 5 ML: 9 INJECTION, SOLUTION INTRAMUSCULAR; INTRAVENOUS; SUBCUTANEOUS at 09:19

## 2019-12-02 RX ADMIN — FENTANYL CITRATE 50 MCG: 50 INJECTION INTRAMUSCULAR; INTRAVENOUS at 09:12

## 2019-12-02 ASSESSMENT — PULMONARY FUNCTION TESTS
PIF_VALUE: 0
PIF_VALUE: 1
PIF_VALUE: 0

## 2019-12-02 ASSESSMENT — PAIN SCALES - GENERAL: PAINLEVEL_OUTOF10: 0

## 2019-12-02 ASSESSMENT — PAIN DESCRIPTION - DESCRIPTORS: DESCRIPTORS: CONSTANT

## 2019-12-02 ASSESSMENT — PAIN - FUNCTIONAL ASSESSMENT: PAIN_FUNCTIONAL_ASSESSMENT: 0-10

## 2019-12-13 DIAGNOSIS — I10 ESSENTIAL HYPERTENSION: ICD-10-CM

## 2019-12-13 DIAGNOSIS — E78.00 PURE HYPERCHOLESTEROLEMIA: ICD-10-CM

## 2019-12-13 RX ORDER — SIMVASTATIN 20 MG
TABLET ORAL
Qty: 90 TABLET | Refills: 3 | Status: SHIPPED | OUTPATIENT
Start: 2019-12-13 | End: 2020-12-16 | Stop reason: SDUPTHER

## 2019-12-19 ENCOUNTER — OFFICE VISIT (OUTPATIENT)
Dept: PHYSICAL MEDICINE AND REHAB | Age: 69
End: 2019-12-19
Payer: MEDICARE

## 2019-12-19 VITALS
BODY MASS INDEX: 34.97 KG/M2 | SYSTOLIC BLOOD PRESSURE: 122 MMHG | WEIGHT: 190.04 LBS | DIASTOLIC BLOOD PRESSURE: 64 MMHG | HEIGHT: 62 IN

## 2019-12-19 DIAGNOSIS — M47.816 SPONDYLOSIS OF LUMBAR REGION WITHOUT MYELOPATHY OR RADICULOPATHY: Primary | ICD-10-CM

## 2019-12-19 DIAGNOSIS — M54.50 LUMBAR PAIN: ICD-10-CM

## 2019-12-19 DIAGNOSIS — M46.1 SI (SACROILIAC) JOINT INFLAMMATION (HCC): ICD-10-CM

## 2019-12-19 DIAGNOSIS — G89.4 CHRONIC PAIN SYNDROME: ICD-10-CM

## 2019-12-19 DIAGNOSIS — M48.062 LUMBAR STENOSIS WITH NEUROGENIC CLAUDICATION: ICD-10-CM

## 2019-12-19 DIAGNOSIS — M51.36 BULGING LUMBAR DISC: ICD-10-CM

## 2019-12-19 LAB
CHOLESTEROL, FASTING: NORMAL
HDLC SERPL-MCNC: NORMAL MG/DL
LDL CHOLESTEROL CALCULATED: 83 MG/DL (ref 0–160)
TRIGLYCERIDE, FASTING: NORMAL

## 2019-12-19 PROCEDURE — G8482 FLU IMMUNIZE ORDER/ADMIN: HCPCS | Performed by: NURSE PRACTITIONER

## 2019-12-19 PROCEDURE — 99213 OFFICE O/P EST LOW 20 MIN: CPT | Performed by: NURSE PRACTITIONER

## 2019-12-19 PROCEDURE — G8400 PT W/DXA NO RESULTS DOC: HCPCS | Performed by: NURSE PRACTITIONER

## 2019-12-19 PROCEDURE — 3017F COLORECTAL CA SCREEN DOC REV: CPT | Performed by: NURSE PRACTITIONER

## 2019-12-19 PROCEDURE — 1123F ACP DISCUSS/DSCN MKR DOCD: CPT | Performed by: NURSE PRACTITIONER

## 2019-12-19 PROCEDURE — 4040F PNEUMOC VAC/ADMIN/RCVD: CPT | Performed by: NURSE PRACTITIONER

## 2019-12-19 PROCEDURE — G8427 DOCREV CUR MEDS BY ELIG CLIN: HCPCS | Performed by: NURSE PRACTITIONER

## 2019-12-19 PROCEDURE — 1036F TOBACCO NON-USER: CPT | Performed by: NURSE PRACTITIONER

## 2019-12-19 PROCEDURE — G8417 CALC BMI ABV UP PARAM F/U: HCPCS | Performed by: NURSE PRACTITIONER

## 2019-12-19 PROCEDURE — 1090F PRES/ABSN URINE INCON ASSESS: CPT | Performed by: NURSE PRACTITIONER

## 2019-12-19 RX ORDER — TRAMADOL HYDROCHLORIDE 50 MG/1
50 TABLET ORAL EVERY 8 HOURS PRN
Qty: 42 TABLET | Refills: 0 | Status: SHIPPED | OUTPATIENT
Start: 2019-12-19 | End: 2020-01-02

## 2019-12-19 ASSESSMENT — ENCOUNTER SYMPTOMS
COLOR CHANGE: 0
BACK PAIN: 1

## 2019-12-20 DIAGNOSIS — E78.00 PURE HYPERCHOLESTEROLEMIA: ICD-10-CM

## 2019-12-20 DIAGNOSIS — E55.9 VITAMIN D DEFICIENCY: ICD-10-CM

## 2019-12-20 DIAGNOSIS — E78.00 PURE HYPERCHOLESTEROLEMIA: Primary | ICD-10-CM

## 2019-12-23 ENCOUNTER — TELEPHONE (OUTPATIENT)
Dept: FAMILY MEDICINE CLINIC | Age: 69
End: 2019-12-23

## 2020-05-07 ENCOUNTER — VIRTUAL VISIT (OUTPATIENT)
Dept: PHYSICAL MEDICINE AND REHAB | Age: 70
End: 2020-05-07
Payer: MEDICARE

## 2020-05-07 PROCEDURE — 3017F COLORECTAL CA SCREEN DOC REV: CPT | Performed by: NURSE PRACTITIONER

## 2020-05-07 PROCEDURE — 1090F PRES/ABSN URINE INCON ASSESS: CPT | Performed by: NURSE PRACTITIONER

## 2020-05-07 PROCEDURE — 4040F PNEUMOC VAC/ADMIN/RCVD: CPT | Performed by: NURSE PRACTITIONER

## 2020-05-07 PROCEDURE — G8400 PT W/DXA NO RESULTS DOC: HCPCS | Performed by: NURSE PRACTITIONER

## 2020-05-07 PROCEDURE — 1123F ACP DISCUSS/DSCN MKR DOCD: CPT | Performed by: NURSE PRACTITIONER

## 2020-05-07 PROCEDURE — 99214 OFFICE O/P EST MOD 30 MIN: CPT | Performed by: NURSE PRACTITIONER

## 2020-05-07 PROCEDURE — G8428 CUR MEDS NOT DOCUMENT: HCPCS | Performed by: NURSE PRACTITIONER

## 2020-05-07 RX ORDER — TIZANIDINE 2 MG/1
2-4 TABLET ORAL 3 TIMES DAILY PRN
Qty: 90 TABLET | Refills: 0 | Status: ON HOLD | OUTPATIENT
Start: 2020-05-07 | End: 2020-06-15 | Stop reason: ALTCHOICE

## 2020-05-07 ASSESSMENT — ENCOUNTER SYMPTOMS
COLOR CHANGE: 0
BACK PAIN: 1

## 2020-05-07 NOTE — PROGRESS NOTES
TELEHEALTH EVALUATION -- Audio/Visual (During EMPVF-36 public health emergency)      This service is provided through Telehealth. Patient reports that place of service was home. Provider was located at their homeEvaluation of the following organ systems was limited: Vitals/Constitutional/EENT/Resp/CV/GI//MS/Neuro/Skin/Heme-Lymph-Imm. Pursuant to the emergency declaration under the [address] [place] [address], [address] [place] [address] and the [employer] Act, this Virtual Visit was conducted with patient's (and/or legal guardian's) consent, to reduce the patient's risk of exposure to COVID-19 and provide necessary medical care. This Telehealth audio/visual visit was conducted using [employer]. Patient identification was verified at the start of this visit: yes        HPI:   Bassam Mullen is a 81 y.o. female is here today for    Chief Complaint: Low back pain    HPI     \"Radiofrequency ablation of median branches at the levels of L2-3,L3-4 and L4-5 right under fluoroscopic guidance with dr Reshma Thomas on 12/2/19. Patient states she has received at least 50% relief. Patient states she does have some bad days but overall has appreciated the improvement. \" patient states she feels this is nearly worn off.      \"Radiofrequency ablation of median branches at the levels of L2-3,L3-4 and L4-5 left under fluoroscopic guidance with Dr Reshma Thomas on 6/17/19. Patient states she has received 80% relief with this procedure. Patient feels she continues to get about 70% relief at this time. \" patient states she feels that this has worn off. Patient states that most of her pain comes with movement and activity as with prior to RFA. Patient states that she has issues with constant activity and needs to rest at times. \"Lumbar epidural steroid injection under fluoroscopy guidance # 2 at L4 with Dr Reshma Thomas on 9/19/19.  Patient states day one was 80% relief but now down Skin:     Coloration: Skin is not ashen, cyanotic, jaundiced or pale. Neurological:      General: No focal deficit present. Mental Status: She is alert and oriented to person, place, and time. Cranial Nerves: No cranial nerve deficit or facial asymmetry. Psychiatric:         Attention and Perception: Attention normal.         Mood and Affect: Mood and affect normal.         Speech: Speech normal.         Behavior: Behavior normal. Behavior is cooperative. Thought Content: Thought content normal.         Cognition and Memory: Cognition normal.         Judgment: Judgment normal.            Assessment:     1. Spondylosis of lumbar region without myelopathy or radiculopathy    2. Lumbar pain    3. Lumbar stenosis with neurogenic claudication    4. Chronic pain syndrome            Plan:      · [place]AR[place] reviewed. Current MED: 0  · Patient was offered naloxone for home. · Discussed long term side effects of medications, tolerance, dependency and addiction. · Previous UDS reviewed  · Patient told can not receive any pain medications from any other source. · No evidence of abuse, diversion or aberrant behavior.  Medications and/or procedures to improve function and quality of life- patient understanding with this and that may not be pain free   Discussed with patient about safe storage of medications at home   Discussed possible weaning of medication dosing dependent on treatment/procedure results.  Testing:  none   Procedures: Lumbar RFA  L2-3,L3-4 and L4-5 left   Discussed with patient about risks with procedure including infection, reaction to medication, increased pain, or bleeding.  Medications: tizanidine 2-4mg TID     Discussed possiblity      Meds.  Prescribed:   Orders Placed This Encounter   Medications    tiZANidine (ZANAFLEX) 2 MG tablet     Sig: Take 1-2 tablets by mouth 3 times daily as needed (Muscle spasms)     Dispense:  90 tablet     Refill:  0       Return for Lumbar RFA  L2-3,L3-4 and L4-5 left, follow up after procedure. Time spent with patient was 17 minutes, more than 50% was spent Counseling/coordinated the patient'scare.       Electronically signed by SHERRY Harvey CNP on5/7/2020 at 8:27 AM

## 2020-06-08 ENCOUNTER — TELEPHONE (OUTPATIENT)
Dept: PHYSICAL MEDICINE AND REHAB | Age: 70
End: 2020-06-08

## 2020-06-08 NOTE — TELEPHONE ENCOUNTER
Pt. Contacted, Procedure and follow up scheduled. Covid test 6/9 or 6/10/2020. Educated on pre-procedure instructions. Verbalized understanding.

## 2020-06-10 ENCOUNTER — HOSPITAL ENCOUNTER (OUTPATIENT)
Age: 70
Discharge: HOME | End: 2020-06-10
Payer: MEDICARE

## 2020-06-10 PROCEDURE — U0002 COVID-19 LAB TEST NON-CDC: HCPCS

## 2020-06-11 LAB
PERFORMING LAB: NORMAL
REPORT: NORMAL
SARS-COV-2: NOT DETECTED

## 2020-06-15 ENCOUNTER — HOSPITAL ENCOUNTER (OUTPATIENT)
Age: 70
Setting detail: OUTPATIENT SURGERY
Discharge: HOME | End: 2020-06-15
Attending: PAIN MEDICINE | Admitting: PAIN MEDICINE
Payer: MEDICARE

## 2020-06-15 ENCOUNTER — ANESTHESIA EVENT (OUTPATIENT)
Dept: OPERATING ROOM | Age: 70
End: 2020-06-15
Payer: MEDICARE

## 2020-06-15 ENCOUNTER — TELEPHONE (OUTPATIENT)
Dept: PHYSICAL MEDICINE AND REHAB | Age: 70
End: 2020-06-15

## 2020-06-15 ENCOUNTER — ANESTHESIA (OUTPATIENT)
Dept: OPERATING ROOM | Age: 70
End: 2020-06-15
Payer: MEDICARE

## 2020-06-15 ENCOUNTER — APPOINTMENT (OUTPATIENT)
Dept: GENERAL RADIOLOGY | Age: 70
End: 2020-06-15
Attending: PAIN MEDICINE
Payer: MEDICARE

## 2020-06-15 VITALS
OXYGEN SATURATION: 96 % | TEMPERATURE: 97.7 F | WEIGHT: 187.2 LBS | BODY MASS INDEX: 34.45 KG/M2 | DIASTOLIC BLOOD PRESSURE: 57 MMHG | HEIGHT: 62 IN | RESPIRATION RATE: 16 BRPM | HEART RATE: 77 BPM | SYSTOLIC BLOOD PRESSURE: 115 MMHG

## 2020-06-15 VITALS
DIASTOLIC BLOOD PRESSURE: 67 MMHG | SYSTOLIC BLOOD PRESSURE: 152 MMHG | RESPIRATION RATE: 11 BRPM | OXYGEN SATURATION: 96 %

## 2020-06-15 PROCEDURE — 3600000057 HC PAIN LEVEL 4 ADDL 15 MIN: Performed by: PAIN MEDICINE

## 2020-06-15 PROCEDURE — 3700000000 HC ANESTHESIA ATTENDED CARE: Performed by: PAIN MEDICINE

## 2020-06-15 PROCEDURE — 2709999900 HC NON-CHARGEABLE SUPPLY: Performed by: PAIN MEDICINE

## 2020-06-15 PROCEDURE — 6360000002 HC RX W HCPCS: Performed by: NURSE ANESTHETIST, CERTIFIED REGISTERED

## 2020-06-15 PROCEDURE — 7100000010 HC PHASE II RECOVERY - FIRST 15 MIN: Performed by: PAIN MEDICINE

## 2020-06-15 PROCEDURE — 7100000011 HC PHASE II RECOVERY - ADDTL 15 MIN: Performed by: PAIN MEDICINE

## 2020-06-15 PROCEDURE — 3209999900 FLUORO FOR SURGICAL PROCEDURES

## 2020-06-15 PROCEDURE — 3600000056 HC PAIN LEVEL 4 BASE: Performed by: PAIN MEDICINE

## 2020-06-15 PROCEDURE — 3700000001 HC ADD 15 MINUTES (ANESTHESIA): Performed by: PAIN MEDICINE

## 2020-06-15 PROCEDURE — 2500000003 HC RX 250 WO HCPCS: Performed by: PAIN MEDICINE

## 2020-06-15 PROCEDURE — 64635 DESTROY LUMB/SAC FACET JNT: CPT | Performed by: PAIN MEDICINE

## 2020-06-15 PROCEDURE — 2720000010 HC SURG SUPPLY STERILE: Performed by: PAIN MEDICINE

## 2020-06-15 PROCEDURE — 64636 DESTROY L/S FACET JNT ADDL: CPT | Performed by: PAIN MEDICINE

## 2020-06-15 PROCEDURE — 6360000002 HC RX W HCPCS: Performed by: PAIN MEDICINE

## 2020-06-15 RX ORDER — BUPIVACAINE HYDROCHLORIDE 2.5 MG/ML
INJECTION, SOLUTION EPIDURAL; INFILTRATION; INTRACAUDAL PRN
Status: DISCONTINUED | OUTPATIENT
Start: 2020-06-15 | End: 2020-06-15 | Stop reason: ALTCHOICE

## 2020-06-15 RX ORDER — METHYLPREDNISOLONE ACETATE 80 MG/ML
INJECTION, SUSPENSION INTRA-ARTICULAR; INTRALESIONAL; INTRAMUSCULAR; SOFT TISSUE PRN
Status: DISCONTINUED | OUTPATIENT
Start: 2020-06-15 | End: 2020-06-15 | Stop reason: ALTCHOICE

## 2020-06-15 RX ORDER — PROPOFOL 10 MG/ML
INJECTION, EMULSION INTRAVENOUS PRN
Status: DISCONTINUED | OUTPATIENT
Start: 2020-06-15 | End: 2020-06-15 | Stop reason: SDUPTHER

## 2020-06-15 RX ORDER — FENTANYL CITRATE 50 UG/ML
INJECTION, SOLUTION INTRAMUSCULAR; INTRAVENOUS PRN
Status: DISCONTINUED | OUTPATIENT
Start: 2020-06-15 | End: 2020-06-15 | Stop reason: SDUPTHER

## 2020-06-15 RX ORDER — LIDOCAINE HYDROCHLORIDE 10 MG/ML
INJECTION, SOLUTION EPIDURAL; INFILTRATION; INTRACAUDAL; PERINEURAL PRN
Status: DISCONTINUED | OUTPATIENT
Start: 2020-06-15 | End: 2020-06-15 | Stop reason: ALTCHOICE

## 2020-06-15 RX ADMIN — PROPOFOL 20 MG: 10 INJECTION, EMULSION INTRAVENOUS at 11:41

## 2020-06-15 RX ADMIN — PROPOFOL 50 MG: 10 INJECTION, EMULSION INTRAVENOUS at 11:42

## 2020-06-15 RX ADMIN — FENTANYL CITRATE 50 MCG: 50 INJECTION, SOLUTION INTRAMUSCULAR; INTRAVENOUS at 11:35

## 2020-06-15 RX ADMIN — FENTANYL CITRATE 50 MCG: 50 INJECTION, SOLUTION INTRAMUSCULAR; INTRAVENOUS at 11:37

## 2020-06-15 ASSESSMENT — PAIN SCALES - GENERAL
PAINLEVEL_OUTOF10: 0
PAINLEVEL_OUTOF10: 0

## 2020-06-15 ASSESSMENT — PULMONARY FUNCTION TESTS
PIF_VALUE: 0

## 2020-06-15 NOTE — H&P
[address] [address]  History and Physical Update    Pt Name: Ritchie Fermin  MRN: [id number]  YOB: 1950  Date of evaluation: 6/15/2020      I have examined the patient and reviewed the H&P/Consult and there are no changes to the patient or plans.         Electronically signed by Amrit Fitzgerald MD on 6/15/2020 at 9:35 AM
catheterization (2008); Cardiac catheterization (12/18/2018); Endoscopy, colon, diagnostic (01/27/2019); Nerve Block Lumb Facet Level 1 Bilateral (Bilateral, 2/4/2019); Nerve Block Lumb Facet Level 1 Bilateral (Bilateral, 3/25/2019); Lumbar spine surgery (Right, 5/20/2019); Lumbar spine surgery (Left, 6/17/2019); epidural steroid injection (Bilateral, 8/1/2019); epidural steroid injection (Bilateral, 9/19/2019); and Lumbar spine surgery (Right, 12/2/2019).    Family History  This patient's family history includes Alzheimer's Disease in her mother; Heart Attack in her brother and father; Heart Disease in her brother and father; High Blood Pressure in her father and mother.  Margarita Valles  reports that she has never smoked. She has never used smokeless tobacco. She reports that she does not drink alcohol or use drugs.     Medications    Current Medication      Current Outpatient Medications:     tiZANidine (ZANAFLEX) 2 MG tablet, Take 1-2 tablets by mouth 3 times daily as needed (Muscle spasms), Disp: 90 tablet, Rfl: 0    simvastatin (ZOCOR) 20 MG tablet, TAKE 1 TABLET BY MOUTH EVERY DAY, Disp: 90 tablet, Rfl: 3    lisinopril (PRINIVIL;ZESTRIL) 20 MG tablet, TAKE 1 TABLET BY MOUTH EVERY DAY, Disp: 90 tablet, Rfl: 3    raloxifene (EVISTA) 60 MG tablet, Take 1 tablet by mouth daily, Disp: 90 tablet, Rfl: 3    pantoprazole (PROTONIX) 40 MG tablet, , Disp: , Rfl:     ibuprofen (ADVIL;MOTRIN) 200 MG tablet, Take 200 mg by mouth every 6 hours as needed for Pain, Disp: , Rfl:     amLODIPine (NORVASC) 2.5 MG tablet, Take 1 tablet by mouth daily, Disp: 90 tablet, Rfl: 3    Cholecalciferol (VITAMIN D3) 5000 UNITS CAPS, Take 1 capsule by mouth daily. , Disp: , Rfl:     aspirin 81 MG tablet, Take 81 mg by mouth daily. , Disp: , Rfl:     calcium carbonate 600 MG TABS tablet, Take 1 tablet by mouth 2 times daily.   , Disp: , Rfl:         Subjective:      Review of Systems   Constitutional: Positive for activity

## 2020-06-15 NOTE — ANESTHESIA PRE PROCEDURE
Department of Anesthesiology  Preprocedure Note       Name:  Mary Ann Riddle   Age:  81 y.o.  :  1950                                          MRN:  [id number]         Date:  6/15/2020      Surgeon: Mayo Banks):  Lilly German MD    Procedure: Procedure(s):  Lumbar RFA  L2-3,L3-4 and L4-5 left,    Medications prior to admission:   Prior to Admission medications    Medication Sig Start Date End Date Taking? Authorizing Provider   simvastatin (ZOCOR) 20 MG tablet TAKE 1 TABLET BY MOUTH EVERY DAY 19  Yes Marjan Dennison MD   lisinopril (PRINIVIL;ZESTRIL) 20 MG tablet TAKE 1 TABLET BY MOUTH EVERY DAY 10/29/19  Yes Marjan Dennison MD   amLODIPine (NORVASC) 2.5 MG tablet Take 1 tablet by mouth daily 10/9/18 6/15/20 Yes Marjan Dennison MD   pantoprazole (PROTONIX) 40 MG tablet  19   Historical Provider, MD   ibuprofen (ADVIL;MOTRIN) 200 MG tablet Take 200 mg by mouth every 6 hours as needed for Pain    Historical Provider, MD   Cholecalciferol (VITAMIN D3) 5000 UNITS CAPS Take 1 capsule by mouth daily. Marjan Dennison MD   aspirin 81 MG tablet Take 81 mg by mouth daily. Historical Provider, MD   calcium carbonate 600 MG TABS tablet Take 1 tablet by mouth 2 times daily. Historical Provider, MD       Current medications:    No current facility-administered medications for this encounter. Allergies:     Allergies   Allergen Reactions    Codeine Swelling    Pcn [Penicillins] Rash       Problem List:    Patient Active Problem List   Diagnosis Code    Anxiety and depression F41.9, F32.9    Obesity, Class II, BMI 35-39.9, with comorbidity [id number]    Osteopenia M85.80    Vitamin D deficiency E55.9    Chronic gastritis K29.50    Tonsillar hypertrophy J35.1    Globus pharyngeus R09.89    Disease of larynx J38.7    GERD (gastroesophageal reflux disease) K21.9    Dysphagia R13.10    Nasal septal deviation J34.2    Epistaxis R04.0    Nasal mucositis (ulcerative) LUMBAR FACET MBB @ L2-3,  L3-4,  L4-5   BILATERAL performed by William Garcia MD at [facility]  10/2013       Social History:    Social History     Tobacco Use    Smoking status: Never Smoker    Smokeless tobacco: Never Used   Substance Use Topics    Alcohol use: No                                Counseling given: Not Answered      Vital Signs (Current):   Vitals:    06/15/20 1016   BP: 133/63   Pulse: 61   Resp: 20   Temp: 96.8 °F (36 °C)   TempSrc: Temporal   SpO2: 96%   Weight: 187 lb 3.2 oz (84.9 kg)   Height: 5' 2\" (1.575 m)                                              BP Readings from Last 3 Encounters:   06/15/20 133/63   12/19/19 122/64   12/02/19 (!) 96/49       NPO Status:                                                                                 BMI:   Wt Readings from Last 3 Encounters:   06/15/20 187 lb 3.2 oz (84.9 kg)   12/19/19 190 lb 0.6 oz (86.2 kg)   12/02/19 190 lb (86.2 kg)     Body mass index is 34.24 kg/m². CBC:   Lab Results   Component Value Date    WBC 6.9 08/30/2019    RBC 4.19 08/30/2019    HGB 12.5 08/30/2019    HCT 37.6 08/30/2019    MCV 89.8 08/30/2019    RDW 14.1 08/30/2019     08/30/2019       CMP:   Lab Results   Component Value Date     08/30/2019    K 3.8 08/30/2019     08/30/2019    CO2 26 08/30/2019    BUN 16 08/30/2019    CREATININE 0.67 08/30/2019    AGRATIO 1.7 08/30/2019    LABGLOM 72 04/10/2018    GLUCOSE 112 08/30/2019    PROT 6.5 08/30/2019    CALCIUM 8.90 08/30/2019    BILITOT 0.5 08/30/2019    ALKPHOS 87 08/30/2019    AST 22 08/30/2019    ALT 19 08/30/2019       POC Tests: No results for input(s): POCGLU, POCNA, POCK, POCCL, POCBUN, POCHEMO, POCHCT in the last 72 hours.     Coags:   Lab Results   Component Value Date    PROTIME 12.0 12/14/2018    INR 1.04 12/14/2018       HCG (If Applicable): No results found for: PREGTESTUR, PREGSERUM, HCG, HCGQUANT     ABGs: No results found for: PHART, PO2ART, ZPL2SPQ, KNA1RQF, BEART, A9TGECZA     Type & Screen (If Applicable):  No results found for: LABABO, LABRH    Drug/Infectious Status (If Applicable):  No results found for: HIV, HEPCAB    COVID-19 Screening (If Applicable):   Lab Results   Component Value Date    COVID19 NOT DETECTED 06/10/2020         Anesthesia Evaluation  Patient summary reviewed  Airway: Mallampati: III  TM distance: >3 FB   Neck ROM: full  Mouth opening: > = 3 FB Dental:          Pulmonary:                              Cardiovascular:    (+) hypertension:, CAD:,               Stress test reviewed                Neuro/Psych:   (+) neuromuscular disease:, psychiatric history:            GI/Hepatic/Renal:   (+) GERD:,           Endo/Other:                     Abdominal:           Vascular:                                      Anesthesia Plan      MAC     ASA 3       Induction: intravenous. Anesthetic plan and risks discussed with patient. Plan discussed with LIBERTAD Brock.  [address] [place] [address],    6/15/2020

## 2020-06-15 NOTE — OP NOTE
Operative Note  Pre-Procedure Note    Patient Name: Azucena Mccartney   YOB: 1950  Medical Record Number: [id number]  Date: 6/15/20    Indication:  Lower back pain  Consent: On file. Vital Signs:   Vitals:    06/15/20 1016   BP: 133/63   Pulse: 61   Resp: 20   Temp: 96.8 °F (36 °C)   SpO2: 96%       Past Medical History:   has a past medical history of Adenomatous colon polyp, Chronic gastritis, Coronary artery spasm (HCC), GERD (gastroesophageal reflux disease), Hiatal hernia, Hyperlipidemia, Hypertension, MVP (mitral valve prolapse), Obesity, Class II, BMI 35-39.9, with comorbidity, Osteopenia, Scoliosis (and kyphoscoliosis), idiopathic, and Vitamin D deficiency. Past Surgical History:   has a past surgical history that includes Carpal tunnel release (2007); Hand surgery (2008); Upper gastrointestinal endoscopy (10/2013); Cardiac catheterization (2008); Cardiac catheterization (12/18/2018); Endoscopy, colon, diagnostic (01/27/2019); Nerve Block Lumb Facet Level 1 Bilateral (Bilateral, 2/4/2019); Nerve Block Lumb Facet Level 1 Bilateral (Bilateral, 3/25/2019); Lumbar spine surgery (Right, 5/20/2019); Lumbar spine surgery (Left, 6/17/2019); epidural steroid injection (Bilateral, 8/1/2019); epidural steroid injection (Bilateral, 9/19/2019); and Lumbar spine surgery (Right, 12/2/2019). Pre-Sedation Documentation and Exam:   Vital signs have been reviewed (see flow sheet for vitals).      Sedation/ Anesthesia Plan:   MAC    Patient is an appropriate candidate for plan of sedation: yes       Preoperative Diagnosis:  L-spondylosis     Post-Op Dx: as above     Procedure Performed:  :Radiofrequency ablation of median branches at the levels of L2-3,L3-4 and L4-5 left  under fluoroscopic guidance      Indication for the Procedure:  The patient has ahistory of chronic low back pain that is not responding well to the conservative treatment.  Patient's pain is mostly axial in nature.  Pain is interfering with

## 2020-07-06 ENCOUNTER — OFFICE VISIT (OUTPATIENT)
Dept: PHYSICAL MEDICINE AND REHAB | Age: 70
End: 2020-07-06
Payer: MEDICARE

## 2020-07-06 VITALS
SYSTOLIC BLOOD PRESSURE: 106 MMHG | DIASTOLIC BLOOD PRESSURE: 60 MMHG | HEIGHT: 62 IN | TEMPERATURE: 97.2 F | BODY MASS INDEX: 34.41 KG/M2 | WEIGHT: 187 LBS

## 2020-07-06 PROCEDURE — 1090F PRES/ABSN URINE INCON ASSESS: CPT | Performed by: NURSE PRACTITIONER

## 2020-07-06 PROCEDURE — G8400 PT W/DXA NO RESULTS DOC: HCPCS | Performed by: NURSE PRACTITIONER

## 2020-07-06 PROCEDURE — 4040F PNEUMOC VAC/ADMIN/RCVD: CPT | Performed by: NURSE PRACTITIONER

## 2020-07-06 PROCEDURE — 99213 OFFICE O/P EST LOW 20 MIN: CPT | Performed by: NURSE PRACTITIONER

## 2020-07-06 PROCEDURE — 1123F ACP DISCUSS/DSCN MKR DOCD: CPT | Performed by: NURSE PRACTITIONER

## 2020-07-06 PROCEDURE — G8427 DOCREV CUR MEDS BY ELIG CLIN: HCPCS | Performed by: NURSE PRACTITIONER

## 2020-07-06 PROCEDURE — G8417 CALC BMI ABV UP PARAM F/U: HCPCS | Performed by: NURSE PRACTITIONER

## 2020-07-06 PROCEDURE — 3017F COLORECTAL CA SCREEN DOC REV: CPT | Performed by: NURSE PRACTITIONER

## 2020-07-06 PROCEDURE — 1036F TOBACCO NON-USER: CPT | Performed by: NURSE PRACTITIONER

## 2020-07-06 ASSESSMENT — ENCOUNTER SYMPTOMS
BACK PAIN: 1
COLOR CHANGE: 0

## 2020-07-06 NOTE — PROGRESS NOTES
[facility]  [address]  Dept: [phone]  Dept Fax: [phone]: [phone]    Visit Date: 7/6/2020    Functionality Assessment/Goals Worksheet     On a scale of 0 (Does not Interfere) to 10 (Completely Interferes)     1. Which number describes how during the past week pain has interfered with       the following:  A. General Activity:  4  B. Mood: 4  C. Walking Ability:  5  D. Normal Work (Includes both work outside the home and housework):  4  E. Relations with Other People:   2  F. Sleep:   2  G. Enjoyment of Life:   3    2. Patient Prefers to Take their Pain Medications:     []  On a regular basis   [x]  Only when necessary    []  Does not take pain medications    3. What are the Patient's Goals/Expectations for Visiting Pain Management? []  Learn about my pain    []  Receive Medication   []  Physical Therapy     []  Treat Depression   [x]  Receive Injections    []  Treat Sleep   []  Deal with Anxiety and Stress   []  Treat Opoid Dependence/Addiction   []  Other:      HPI:   Julio Parra is a 81 y.o. female is here today for    Chief Complaint: Low back pain    HPI     Radiofrequency ablation of median branches at the levels of L2-3,L3-4 and L4-5 left  under fluoroscopic guidance with Dr Josemanuel Monreal on 6/15/2020. Patient states she has received 60%+ relief on the left side, states this has worked great and happy with results. patient states pain has decreased in intensity. Radiofrequency ablation of median branches at the levels of L2-3,L3-4 and L4-5 right under fluoroscopic guidance with dr Josemanuel Monreal on 12/2/19. Patient states she has received at least 50% relief. Patient states she does have some bad days but overall has appreciated the improvement. patient feels this is nearly worn off at this time and would like to have this repeated. Medications reviewed. 20 MG tablet, TAKE 1 TABLET BY MOUTH EVERY DAY, Disp: 90 tablet, Rfl: 3    pantoprazole (PROTONIX) 40 MG tablet, , Disp: , Rfl:     ibuprofen (ADVIL;MOTRIN) 200 MG tablet, Take 200 mg by mouth every 6 hours as needed for Pain, Disp: , Rfl:     Cholecalciferol (VITAMIN D3) 5000 UNITS CAPS, Take 1 capsule by mouth daily. , Disp: , Rfl:     aspirin 81 MG tablet, Take 81 mg by mouth daily. , Disp: , Rfl:     calcium carbonate 600 MG TABS tablet, Take 1 tablet by mouth 2 times daily. , Disp: , Rfl:     amLODIPine (NORVASC) 2.5 MG tablet, Take 1 tablet by mouth daily, Disp: 90 tablet, Rfl: 3    Subjective:      Review of Systems   Constitutional: Positive for activity change. Negative for chills, diaphoresis, fatigue and fever. Genitourinary: Negative for difficulty urinating, frequency and urgency. Musculoskeletal: Positive for arthralgias, back pain and myalgias. Negative for gait problem, neck pain and neck stiffness. Skin: Negative for color change, rash and wound. Allergic/Immunologic: Negative for environmental allergies and food allergies. Neurological: Negative for dizziness, seizures, light-headedness, numbness and headaches. Hematological: Does not bruise/bleed easily. Psychiatric/Behavioral: Negative for sleep disturbance. The patient is not nervous/anxious. Objective:     Vitals:    07/06/20 1334   BP: 106/60   Site: Left Upper Arm   Position: Sitting   Cuff Size: Medium Adult   Temp: 97.2 °F (36.2 °C)   Weight: 187 lb (84.8 kg)   Height: 5' 2\" (1.575 m)       Physical Exam  Vitals signs reviewed. Constitutional:       General: She is not in acute distress. Appearance: She is well-developed. She is not diaphoretic. HENT:      Head: Normocephalic and atraumatic. Not macrocephalic and not microcephalic. Right Ear: External ear normal.      Left Ear: External ear normal.   Eyes:      General:         Right eye: No discharge. Left eye: No discharge. Conjunctiva/sclera: Conjunctivae normal.   Neck:      Trachea: No tracheal deviation. Pulmonary:      Effort: Pulmonary effort is normal. No respiratory distress. Musculoskeletal:         General: Tenderness present. Lumbar back: She exhibits decreased range of motion, tenderness, pain and spasm. Back:    Skin:     General: Skin is warm and dry. Coloration: Skin is not pale. Findings: No rash. Neurological:      Mental Status: She is alert and oriented to person, place, and time. Cranial Nerves: No cranial nerve deficit. Psychiatric:         Attention and Perception: She is attentive. Speech: Speech normal.         Behavior: Behavior normal.         Thought Content: Thought content normal.         Judgment: Judgment normal.            Assessment:     1. Spondylosis of lumbar region without myelopathy or radiculopathy    2. Lumbar pain    3. Chronic pain syndrome    4. Lumbar stenosis with neurogenic claudication    5. Bulging lumbar disc            Plan:      · OARRS reviewed. Current MED: 15  · Patient was not offered naloxone for home. · Discussed long term side effects of medications, tolerance, dependency and addiction. · Previous UDS reviewed  · Patient told can not receive any pain medications from any other source. · No evidence of abuse, diversion or aberrant behavior.  Medications and/or procedures to improve function and quality of life- patient understanding with this and that may not be pain free   Discussed with patient about safe storage of medications at home   Discussed possible weaning of medication dosing dependent on treatment/procedure results.  Testing: none   Procedures: Lumbar RFA L2-3,L3-4 and L4-5 right   Discussed with patient about risks with procedure including infection, reaction to medication, increased pain, or bleeding.  Medications:  Continue OTC at this time. Meds.  Prescribed:   No orders of the defined types were placed in this encounter. Return for Lumbar RFA L2-3,L3-4 and L4-5 right, follow up after procedure.            Electronically signed by SHERRY Simental CNP on7/6/2020 at 1:52 PM

## 2020-07-15 ENCOUNTER — TELEPHONE (OUTPATIENT)
Dept: FAMILY MEDICINE CLINIC | Age: 70
End: 2020-07-15

## 2020-07-15 RX ORDER — PREDNISONE 20 MG/1
20 TABLET ORAL DAILY
Qty: 10 TABLET | Refills: 0 | Status: SHIPPED | OUTPATIENT
Start: 2020-07-15 | End: 2020-07-25

## 2020-07-15 NOTE — TELEPHONE ENCOUNTER
Pt called req RX for spreading poison ivy. Nothing OTC helping per pt.     [facility], [address]    Phone [phone]

## 2020-07-27 ENCOUNTER — HOSPITAL ENCOUNTER (OUTPATIENT)
Age: 70
Discharge: HOME | End: 2020-07-27
Payer: MEDICARE

## 2020-07-27 PROCEDURE — U0003 INFECTIOUS AGENT DETECTION BY NUCLEIC ACID (DNA OR RNA); SEVERE ACUTE RESPIRATORY SYNDROME CORONAVIRUS 2 (SARS-COV-2) (CORONAVIRUS DISEASE [COVID-19]), AMPLIFIED PROBE TECHNIQUE, MAKING USE OF HIGH THROUGHPUT TECHNOLOGIES AS DESCRIBED BY CMS-2020-01-R: HCPCS

## 2020-07-29 LAB — SARS-COV-2: NOT DETECTED

## 2020-07-30 ENCOUNTER — TELEPHONE (OUTPATIENT)
Dept: PHYSICAL MEDICINE AND REHAB | Age: 70
End: 2020-07-30

## 2020-07-31 ENCOUNTER — APPOINTMENT (OUTPATIENT)
Dept: GENERAL RADIOLOGY | Age: 70
End: 2020-07-31
Attending: PAIN MEDICINE
Payer: MEDICARE

## 2020-07-31 ENCOUNTER — ANESTHESIA EVENT (OUTPATIENT)
Dept: OPERATING ROOM | Age: 70
End: 2020-07-31
Payer: MEDICARE

## 2020-07-31 ENCOUNTER — ANESTHESIA (OUTPATIENT)
Dept: OPERATING ROOM | Age: 70
End: 2020-07-31
Payer: MEDICARE

## 2020-07-31 ENCOUNTER — HOSPITAL ENCOUNTER (OUTPATIENT)
Age: 70
Setting detail: OUTPATIENT SURGERY
Discharge: HOME | End: 2020-07-31
Attending: PAIN MEDICINE | Admitting: PAIN MEDICINE
Payer: MEDICARE

## 2020-07-31 VITALS
RESPIRATION RATE: 11 BRPM | OXYGEN SATURATION: 98 % | SYSTOLIC BLOOD PRESSURE: 126 MMHG | DIASTOLIC BLOOD PRESSURE: 67 MMHG

## 2020-07-31 VITALS
HEART RATE: 74 BPM | HEIGHT: 62 IN | DIASTOLIC BLOOD PRESSURE: 63 MMHG | TEMPERATURE: 97.3 F | WEIGHT: 186 LBS | SYSTOLIC BLOOD PRESSURE: 105 MMHG | OXYGEN SATURATION: 94 % | RESPIRATION RATE: 18 BRPM | BODY MASS INDEX: 34.23 KG/M2

## 2020-07-31 PROCEDURE — 2709999900 HC NON-CHARGEABLE SUPPLY: Performed by: PAIN MEDICINE

## 2020-07-31 PROCEDURE — 3700000001 HC ADD 15 MINUTES (ANESTHESIA): Performed by: PAIN MEDICINE

## 2020-07-31 PROCEDURE — 6360000002 HC RX W HCPCS: Performed by: NURSE ANESTHETIST, CERTIFIED REGISTERED

## 2020-07-31 PROCEDURE — 64636 DESTROY L/S FACET JNT ADDL: CPT | Performed by: PAIN MEDICINE

## 2020-07-31 PROCEDURE — 3209999900 FLUORO FOR SURGICAL PROCEDURES

## 2020-07-31 PROCEDURE — 3600000056 HC PAIN LEVEL 4 BASE: Performed by: PAIN MEDICINE

## 2020-07-31 PROCEDURE — 2500000003 HC RX 250 WO HCPCS: Performed by: PAIN MEDICINE

## 2020-07-31 PROCEDURE — 7100000011 HC PHASE II RECOVERY - ADDTL 15 MIN: Performed by: PAIN MEDICINE

## 2020-07-31 PROCEDURE — 7100000010 HC PHASE II RECOVERY - FIRST 15 MIN: Performed by: PAIN MEDICINE

## 2020-07-31 PROCEDURE — 6360000002 HC RX W HCPCS: Performed by: PAIN MEDICINE

## 2020-07-31 PROCEDURE — 2720000010 HC SURG SUPPLY STERILE: Performed by: PAIN MEDICINE

## 2020-07-31 PROCEDURE — 3600000057 HC PAIN LEVEL 4 ADDL 15 MIN: Performed by: PAIN MEDICINE

## 2020-07-31 PROCEDURE — 2500000003 HC RX 250 WO HCPCS: Performed by: NURSE ANESTHETIST, CERTIFIED REGISTERED

## 2020-07-31 PROCEDURE — 64635 DESTROY LUMB/SAC FACET JNT: CPT | Performed by: PAIN MEDICINE

## 2020-07-31 PROCEDURE — 3700000000 HC ANESTHESIA ATTENDED CARE: Performed by: PAIN MEDICINE

## 2020-07-31 RX ORDER — BUPIVACAINE HYDROCHLORIDE 2.5 MG/ML
INJECTION, SOLUTION EPIDURAL; INFILTRATION; INTRACAUDAL PRN
Status: DISCONTINUED | OUTPATIENT
Start: 2020-07-31 | End: 2020-07-31 | Stop reason: ALTCHOICE

## 2020-07-31 RX ORDER — PROPOFOL 10 MG/ML
INJECTION, EMULSION INTRAVENOUS PRN
Status: DISCONTINUED | OUTPATIENT
Start: 2020-07-31 | End: 2020-07-31 | Stop reason: SDUPTHER

## 2020-07-31 RX ORDER — LIDOCAINE HYDROCHLORIDE 20 MG/ML
INJECTION, SOLUTION EPIDURAL; INFILTRATION; INTRACAUDAL; PERINEURAL PRN
Status: DISCONTINUED | OUTPATIENT
Start: 2020-07-31 | End: 2020-07-31 | Stop reason: SDUPTHER

## 2020-07-31 RX ORDER — LIDOCAINE HYDROCHLORIDE 10 MG/ML
INJECTION, SOLUTION EPIDURAL; INFILTRATION; INTRACAUDAL; PERINEURAL PRN
Status: DISCONTINUED | OUTPATIENT
Start: 2020-07-31 | End: 2020-07-31 | Stop reason: ALTCHOICE

## 2020-07-31 RX ORDER — FENTANYL CITRATE 50 UG/ML
INJECTION, SOLUTION INTRAMUSCULAR; INTRAVENOUS PRN
Status: DISCONTINUED | OUTPATIENT
Start: 2020-07-31 | End: 2020-07-31 | Stop reason: SDUPTHER

## 2020-07-31 RX ORDER — METHYLPREDNISOLONE ACETATE 80 MG/ML
INJECTION, SUSPENSION INTRA-ARTICULAR; INTRALESIONAL; INTRAMUSCULAR; SOFT TISSUE PRN
Status: DISCONTINUED | OUTPATIENT
Start: 2020-07-31 | End: 2020-07-31 | Stop reason: ALTCHOICE

## 2020-07-31 RX ADMIN — FENTANYL CITRATE 50 MCG: 50 INJECTION, SOLUTION INTRAMUSCULAR; INTRAVENOUS at 09:11

## 2020-07-31 RX ADMIN — FENTANYL CITRATE 50 MCG: 50 INJECTION, SOLUTION INTRAMUSCULAR; INTRAVENOUS at 09:10

## 2020-07-31 RX ADMIN — PROPOFOL 60 MG: 10 INJECTION, EMULSION INTRAVENOUS at 09:17

## 2020-07-31 RX ADMIN — LIDOCAINE HYDROCHLORIDE 40 MG: 20 INJECTION, SOLUTION EPIDURAL; INFILTRATION; INTRACAUDAL; PERINEURAL at 09:17

## 2020-07-31 ASSESSMENT — PULMONARY FUNCTION TESTS
PIF_VALUE: 0

## 2020-07-31 ASSESSMENT — PAIN - FUNCTIONAL ASSESSMENT: PAIN_FUNCTIONAL_ASSESSMENT: 0-10

## 2020-07-31 ASSESSMENT — PAIN DESCRIPTION - DESCRIPTORS: DESCRIPTORS: CONSTANT;SHARP;NAGGING

## 2020-07-31 NOTE — H&P
Radiofrequency ablation of median branches at the levels of L2-3,L3-4 and L4-5 left  under fluoroscopic guidance with Dr Zoran Gonzalez on 6/15/2020. Patient states she has received 60%+ relief on the left side, states this has worked great and happy with results. patient states pain has decreased in intensity.      Radiofrequency ablation of median branches at the levels of L2-3,L3-4 and L4-5 right under fluoroscopic guidance with dr Zoran Gonzalez on 12/2/19. Patient states she has received at least 50% relief. Patient states she does have some bad days but overall has appreciated the improvement. patient feels this is nearly worn off at this time and would like to have this repeated.      Medications reviewed. Patient denies side effects with medications. Patient states she is taking medications as prescribed. Shedenies receiving pain medications from other sources. She denies any ER visits since last visit. Pill count was not completed today and was appropriate        The patientis allergic to codeine and pcn [penicillins].     Past Medical History  Domenico Pean  has a past medical history of Adenomatous colon polyp, Chronic gastritis, Coronary artery spasm ([address] GERD (gastroesophageal reflux disease), Hiatal hernia, Hyperlipidemia, Hypertension, MVP (mitral valve prolapse), Obesity, Class II, BMI 35-39.9, with comorbidity, Osteopenia, Scoliosis (and kyphoscoliosis), idiopathic, and Vitamin D deficiency.     Past Surgical History  The patient  has a past surgical history that includes Carpal tunnel release (2007); Hand surgery (2008); Upper gastrointestinal endoscopy (10/2013); Cardiac catheterization (2008); Cardiac catheterization (12/18/2018); Endoscopy, colon, diagnostic (01/27/2019); Nerve Block Lumb Facet Level 1 Bilateral (Bilateral, 2/4/2019); Nerve Block Lumb Facet Level 1 Bilateral (Bilateral, 3/25/2019); Lumbar spine surgery (Right, 5/20/2019);  Lumbar spine surgery (Left, 6/17/2019); epidural steroid injection (Bilateral, 8/1/2019); epidural steroid injection (Bilateral, 9/19/2019); Lumbar spine surgery (Right, 12/2/2019); and Pain management procedure (Left, 6/15/2020).    Family History  This patient's family history includes Alzheimer's Disease in her mother; Heart Attack in her brother and father; Heart Disease in her brother and father; High Blood Pressure in her father and mother.  Lyric Carcamo  reports that she has never smoked. She has never used smokeless tobacco. She reports that she does not drink alcohol or use drugs.     Medications  Current Medication     Current Outpatient Medications:     simvastatin (ZOCOR) 20 MG tablet, TAKE 1 TABLET BY MOUTH EVERY DAY, Disp: 90 tablet, Rfl: 3    lisinopril (PRINIVIL;ZESTRIL) 20 MG tablet, TAKE 1 TABLET BY MOUTH EVERY DAY, Disp: 90 tablet, Rfl: 3    pantoprazole (PROTONIX) 40 MG tablet, , Disp: , Rfl:     ibuprofen (ADVIL;MOTRIN) 200 MG tablet, Take 200 mg by mouth every 6 hours as needed for Pain, Disp: , Rfl:     Cholecalciferol (VITAMIN D3) 5000 UNITS CAPS, Take 1 capsule by mouth daily. , Disp: , Rfl:     aspirin 81 MG tablet, Take 81 mg by mouth daily. , Disp: , Rfl:     calcium carbonate 600 MG TABS tablet, Take 1 tablet by mouth 2 times daily. , Disp: , Rfl:     amLODIPine (NORVASC) 2.5 MG tablet, Take 1 tablet by mouth daily, Disp: 90 tablet, Rfl: 3        Subjective:      Review of Systems   Constitutional: Positive for activity change. Negative for chills, diaphoresis, fatigue and fever. Genitourinary: Negative for difficulty urinating, frequency and urgency. Musculoskeletal: Positive for arthralgias, back pain and myalgias. Negative for gait problem, neck pain and neck stiffness. Skin: Negative for color change, rash and wound. Allergic/Immunologic: Negative for environmental allergies and food allergies. Neurological: Negative for dizziness, seizures, light-headedness, numbness and headaches. Hematological: Does not bruise/bleed easily. Psychiatric/Behavioral: Negative for sleep disturbance. The patient is not nervous/anxious.          Objective:      Vitals       Vitals:     07/06/20 1334   BP: 106/60   Site: Left Upper Arm   Position: Sitting   Cuff Size: Medium Adult   Temp: 97.2 °F (36.2 °C)   Weight: 187 lb (84.8 kg)   Height: 5' 2\" (1.575 m)           Physical Exam  Vitals signs reviewed. Constitutional:       General: She is not in acute distress. Appearance: She is well-developed. She is not diaphoretic. HENT:      Head: Normocephalic and atraumatic. Not macrocephalic and not microcephalic. Right Ear: External ear normal.      Left Ear: External ear normal.   Eyes:      General:         Right eye: No discharge. Left eye: No discharge. Conjunctiva/sclera: Conjunctivae normal.   Neck:      Trachea: No tracheal deviation. Pulmonary:      Effort: Pulmonary effort is normal. No respiratory distress. Musculoskeletal:         General: Tenderness present. Lumbar back: She exhibits decreased range of motion, tenderness, pain and spasm. Back:    Skin:     General: Skin is warm and dry. Coloration: Skin is not pale. Findings: No rash. Neurological:      Mental Status: She is alert and oriented to person, place, and time. Cranial Nerves: No cranial nerve deficit. Psychiatric:         Attention and Perception: She is attentive. Speech: Speech normal.         Behavior: Behavior normal.         Thought Content: Thought content normal.         Judgment: Judgment normal.               Assessment:      1. Spondylosis of lumbar region without myelopathy or radiculopathy    2. Lumbar pain    3. Chronic pain syndrome    4. Lumbar stenosis with neurogenic claudication    5. Bulging lumbar disc            Plan:      · OARRS reviewed. Current MED: 15  · Patient was not offered naloxone for home.    · Discussed long term side effects of medications, tolerance, dependency and

## 2020-07-31 NOTE — PROGRESS NOTES
2317- Pt arrived to PACU phase 2, VSS, pt beahting deeply on room air, Tabitha RN at bedside for report. 3173- Snack given  0937-Pt doing well.    [address] called  0940- IV removed  [id number]- PT discharged walked to car with this RN

## 2020-07-31 NOTE — H&P
[facility]  History and Physical Update    Pt Name: Jonas Luevano  MRN: [id number]  YOB: 1950  Date of evaluation: 7/31/2020      I have examined the patient and reviewed the H&P/Consult and there are no changes to the patient or plans.         Electronically signed by Francisca Flor MD on 7/31/2020 at 8:48 AM

## 2020-07-31 NOTE — OP NOTE
Operative Note    Pre-Procedure Note    Patient Name: Mir Saenz   YOB: 1950  Medical Record Number: [id number]  Date: 7/31/20    Indication:  Lower back pain  Consent: On file. Vital Signs:   Vitals:    07/31/20 0818   BP: 138/64   Pulse: 59   Resp: 15   Temp: 97.3 °F (36.3 °C)   SpO2: 99%       Past Medical History:   has a past medical history of Adenomatous colon polyp, Chronic gastritis, Coronary artery spasm (HCC), GERD (gastroesophageal reflux disease), Hiatal hernia, Hyperlipidemia, Hypertension, MVP (mitral valve prolapse), Obesity, Class II, BMI 35-39.9, with comorbidity, Osteopenia, Scoliosis (and kyphoscoliosis), idiopathic, and Vitamin D deficiency. Past Surgical History:   has a past surgical history that includes Carpal tunnel release (2007); Hand surgery (2008); Upper gastrointestinal endoscopy (10/2013); Cardiac catheterization (2008); Cardiac catheterization (12/18/2018); Endoscopy, colon, diagnostic (01/27/2019); Nerve Block Lumb Facet Level 1 Bilateral (Bilateral, 2/4/2019); Nerve Block Lumb Facet Level 1 Bilateral (Bilateral, 3/25/2019); Lumbar spine surgery (Right, 5/20/2019); Lumbar spine surgery (Left, 6/17/2019); epidural steroid injection (Bilateral, 8/1/2019); epidural steroid injection (Bilateral, 9/19/2019); Lumbar spine surgery (Right, 12/2/2019); and Pain management procedure (Left, 6/15/2020). Pre-Sedation Documentation and Exam:   Vital signs have been reviewed (see flow sheet for vitals).      Sedation/ Anesthesia Plan:   MAC    Patient is an appropriate candidate for plan of sedation: yes           Preoperative Diagnosis:  L-spondylosis     Post-Op Dx: as above     Procedure Performed:  :Radiofrequency ablation of median branches at the levels of L2-3,L3-4 and L4-5 right  under fluoroscopic guidance      Indication for the Procedure:  The patient has ahistory of chronic low back pain that is not responding well to the conservative treatment.  Patient's pain is mostly axial in nature.  Pain is interfering with the activities of daily living.  Physical examination revealed facet tenderness and facet loading is positive.  Patient had undergone lumbar facet joint injections with pain relief that lasted for only a short period of time and had greater than 70% pain relief with confirmatory median branch blocks.  Hence we decided to do radiofrequency abalation of median branches for long term pain releif.   The procedure and risks  were discussed with the patient and an informed consent was obtained.     Procedure:  Right side   A meaningful communication was kept up with the patient throughout the procedure. The patient is placed in prone position and skin over the back was prepped and draped in sterile manner.  Then using fluoroscopy the junction of the transverse process of the vertebra with the superior process of the facet joint was observed and the view was optimized.  The skin and deep tissues posterior were infiltrated with 9 ml of  1% xylocaine. The RF canula with the 10 mm active tip was introduced through the skin wheal under fluoroscopy guidance such that the tip of the needle lies in the groove of the transverse process with the superior processes of the facet joint.  Then a lateral view of the lumbar spine was obtained to make sure the tip of needle is not in the neural foramen.  Then electric impedence was checked to make sure it is acceptable. Then a sensory stimulus was applied at 50 Hz up to 1 volt and concordant pain symptoms were reproduced. Then a motor stimulus was applied at 2 Hz up to 2 volts and no motor stimulation was seen in lower extremities.  Some multifidus stimulus was seen. Dontae Miss after negative aspiration a mixture of depomedrol 80 mg  and 0.25%  marcaine 1.5 cc  was injected through the needle in divided doses.  Then a  lesion was done at 80 degrees centigrade for 90 seconds.     EBL-0     Patient's vital signs and neurological status remained stable throughout the procedure and post procedural period.  The patient tolerated the procedure well and was discharged home in stable condition.           Electronically signed by Carli Antunez MD on 7/31/20 at 9:12 AM EDT

## 2020-07-31 NOTE — ANESTHESIA PRE PROCEDURE
Department of Anesthesiology  Preprocedure Note       Name:  Manuel Vick   Age:  81 y.o.  :  1950                                          MRN:  [id number]         Date:  2020      Surgeon: Galen Baer):  Luis E Navarro MD    Procedure: Procedure(s):  Lumbar RFA L2-3,L3-4 and L4-5 right    Medications prior to admission:   Prior to Admission medications    Medication Sig Start Date End Date Taking? Authorizing Provider   simvastatin (ZOCOR) 20 MG tablet TAKE 1 TABLET BY MOUTH EVERY DAY 19  Yes Lamberto Elaine MD   lisinopril (PRINIVIL;ZESTRIL) 20 MG tablet TAKE 1 TABLET BY MOUTH EVERY DAY 10/29/19  Yes Lamberto Elaine MD   pantoprazole (PROTONIX) 40 MG tablet  19  Yes Historical Provider, MD   amLODIPine (NORVASC) 2.5 MG tablet Take 1 tablet by mouth daily 10/9/18 7/31/20 Yes Lamberto Elaine MD   ibuprofen (ADVIL;MOTRIN) 200 MG tablet Take 200 mg by mouth every 6 hours as needed for Pain    Historical Provider, MD   Cholecalciferol (VITAMIN D3) 5000 UNITS CAPS Take 1 capsule by mouth daily. Lamberto Elaine MD   aspirin 81 MG tablet Take 81 mg by mouth daily. Historical Provider, MD   calcium carbonate 600 MG TABS tablet Take 1 tablet by mouth 2 times daily. Historical Provider, MD       Current medications:    No current facility-administered medications for this encounter. Allergies:     Allergies   Allergen Reactions    Codeine Swelling    Pcn [Penicillins] Rash       Problem List:    Patient Active Problem List   Diagnosis Code    Anxiety and depression F41.9, F32.9    Obesity, Class II, BMI 35-39.9, with comorbidity [id number]    Osteopenia M85.80    Vitamin D deficiency E55.9    Chronic gastritis K29.50    Tonsillar hypertrophy J35.1    Globus pharyngeus R09.89    Disease of larynx J38.7    GERD (gastroesophageal reflux disease) K21.9    Dysphagia R13.10    Nasal septal deviation J34.2    Epistaxis R04.0    Nasal mucositis (ulcerative) LUMBAR FACET MBB @ L2-3,  L3-4,  L4-5   BILATERAL performed by Juvenal Germain MD at [address] Left 6/15/2020    Lumbar RFA  L2-3,L3-4 and L4-5 left, performed by Juvenal Germain MD at [facility]  10/2013       Social History:    Social History     Tobacco Use    Smoking status: Never Smoker    Smokeless tobacco: Never Used   Substance Use Topics    Alcohol use: No                                Counseling given: Not Answered      Vital Signs (Current): There were no vitals filed for this visit. BP Readings from Last 3 Encounters:   07/06/20 106/60   06/15/20 (!) 115/57   06/15/20 (!) 152/67       NPO Status: Time of last liquid consumption: 2000                        Time of last solid consumption: 2000                        Date of last liquid consumption: 07/30/20                        Date of last solid food consumption: 07/30/20    BMI:   Wt Readings from Last 3 Encounters:   07/06/20 187 lb (84.8 kg)   06/15/20 187 lb 3.2 oz (84.9 kg)   12/19/19 190 lb 0.6 oz (86.2 kg)     There is no height or weight on file to calculate BMI.    CBC:   Lab Results   Component Value Date    WBC 6.9 08/30/2019    RBC 4.19 08/30/2019    HGB 12.5 08/30/2019    HCT 37.6 08/30/2019    MCV 89.8 08/30/2019    RDW 14.1 08/30/2019     08/30/2019       CMP:   Lab Results   Component Value Date     08/30/2019    K 3.8 08/30/2019     08/30/2019    CO2 26 08/30/2019    BUN 16 08/30/2019    CREATININE 0.67 08/30/2019    AGRATIO 1.7 08/30/2019    LABGLOM 72 04/10/2018    GLUCOSE 112 08/30/2019    PROT 6.5 08/30/2019    CALCIUM 8.90 08/30/2019    BILITOT 0.5 08/30/2019    ALKPHOS 87 08/30/2019    AST 22 08/30/2019    ALT 19 08/30/2019       POC Tests: No results for input(s): POCGLU, POCNA, POCK, POCCL, POCBUN, POCHEMO, POCHCT in the last 72 hours.     Coags:   Lab Results

## 2020-08-17 ENCOUNTER — OFFICE VISIT (OUTPATIENT)
Dept: PHYSICAL MEDICINE AND REHAB | Age: 70
End: 2020-08-17
Payer: MEDICARE

## 2020-08-17 VITALS
BODY MASS INDEX: 34.23 KG/M2 | TEMPERATURE: 97.4 F | HEART RATE: 70 BPM | DIASTOLIC BLOOD PRESSURE: 70 MMHG | HEIGHT: 62 IN | WEIGHT: 186 LBS | SYSTOLIC BLOOD PRESSURE: 122 MMHG

## 2020-08-17 PROCEDURE — 4040F PNEUMOC VAC/ADMIN/RCVD: CPT | Performed by: NURSE PRACTITIONER

## 2020-08-17 PROCEDURE — G8417 CALC BMI ABV UP PARAM F/U: HCPCS | Performed by: NURSE PRACTITIONER

## 2020-08-17 PROCEDURE — 1123F ACP DISCUSS/DSCN MKR DOCD: CPT | Performed by: NURSE PRACTITIONER

## 2020-08-17 PROCEDURE — 1036F TOBACCO NON-USER: CPT | Performed by: NURSE PRACTITIONER

## 2020-08-17 PROCEDURE — 3017F COLORECTAL CA SCREEN DOC REV: CPT | Performed by: NURSE PRACTITIONER

## 2020-08-17 PROCEDURE — 99214 OFFICE O/P EST MOD 30 MIN: CPT | Performed by: NURSE PRACTITIONER

## 2020-08-17 PROCEDURE — 1090F PRES/ABSN URINE INCON ASSESS: CPT | Performed by: NURSE PRACTITIONER

## 2020-08-17 PROCEDURE — G8400 PT W/DXA NO RESULTS DOC: HCPCS | Performed by: NURSE PRACTITIONER

## 2020-08-17 PROCEDURE — G8427 DOCREV CUR MEDS BY ELIG CLIN: HCPCS | Performed by: NURSE PRACTITIONER

## 2020-08-17 RX ORDER — AMITRIPTYLINE HYDROCHLORIDE 10 MG/1
10 TABLET, FILM COATED ORAL NIGHTLY
Qty: 14 TABLET | Refills: 0 | Status: ON HOLD | OUTPATIENT
Start: 2020-08-17 | End: 2021-02-08

## 2020-08-17 ASSESSMENT — ENCOUNTER SYMPTOMS
BACK PAIN: 1
COLOR CHANGE: 0

## 2020-08-17 NOTE — PROGRESS NOTES
[facility]  [address]  Dept: [phone]  Dept Fax: [phone]: [phone]    Visit Date: 8/17/2020    Functionality Assessment/Goals Worksheet     On a scale of 0 (Does not Interfere) to 10 (Completely Interferes)     1. Which number describes how during the past week pain has interfered with       the following:  A. General Activity:  3  B. Mood: 1  C. Walking Ability:  3  D. Normal Work (Includes both work outside the home and housework):  4  E. Relations with Other People:   1  F. Sleep:   2  G. Enjoyment of Life:   4    2. Patient Prefers to Take their Pain Medications:     []  On a regular basis   [x]  Only when necessary    []  Does not take pain medications    3. What are the Patient's Goals/Expectations for Visiting Pain Management? []  Learn about my pain    []  Receive Medication   []  Physical Therapy     []  Treat Depression   [x]  Receive Injections    []  Treat Sleep   []  Deal with Anxiety and Stress   []  Treat Opoid Dependence/Addiction   []  Other:      HPI:   Eugenia Small is a 81 y.o. female is here today for    Chief Complaint: Low back pain    HPI     Radiofrequency ablation of median branches at the levels of L2-3,L3-4 and L4-5 right under fluoroscopic guidance with Dr Tony Pool on 7/31/2020. Patient states that she has received about 50-60% relief with the RFA. Patient states things are \"great\" compared to previously. Radiofrequency ablation of median branches at the levels of L2-3,L3-4 and L4-5 left  under fluoroscopic guidance with Dr [address] Pool on 6/15/2020. Patient states she has received 60%+ relief on the left side, states this has worked great and happy with results. patient states pain has decreased in intensity. Patient with issue at night getting comfortable due to ache/pain.  Discussed with patient about Elavil, states is prone to gaining weight with medications and doesn't want an antidepressant. Discussed would be limited with medications that don't have a side effect of weight gain for nerve pain. Patient states she would like to have a low dose. Agreed. Medications reviewed. The patientis allergic to codeine and pcn [penicillins]. Past Medical History  Francyne Dance  has a past medical history of Adenomatous colon polyp, Chronic gastritis, Coronary artery spasm ([address] GERD (gastroesophageal reflux disease), Hiatal hernia, Hyperlipidemia, Hypertension, MVP (mitral valve prolapse), Obesity, Class II, BMI 35-39.9, with comorbidity, Osteopenia, Scoliosis (and kyphoscoliosis), idiopathic, and Vitamin D deficiency. Past Surgical History  The patient  has a past surgical history that includes Carpal tunnel release (2007); Hand surgery (2008); Upper gastrointestinal endoscopy (10/2013); Cardiac catheterization (2008); Cardiac catheterization (12/18/2018); Endoscopy, colon, diagnostic (01/27/2019); Nerve Block Lumb Facet Level 1 Bilateral (Bilateral, 2/4/2019); Nerve Block Lumb Facet Level 1 Bilateral (Bilateral, 3/25/2019); Lumbar spine surgery (Right, 5/20/2019); Lumbar spine surgery (Left, 6/17/2019); epidural steroid injection (Bilateral, 8/1/2019); epidural steroid injection (Bilateral, 9/19/2019); Lumbar spine surgery (Right, 12/2/2019); Pain management procedure (Left, 6/15/2020); and Pain management procedure (Right, 7/31/2020). Family History  This patient's family history includes Alzheimer's Disease in her mother; Heart Attack in her brother and father; Heart Disease in her brother and father; High Blood Pressure in her father and mother. Social History  Francyne Dance  reports that she has never smoked. She has never used smokeless tobacco. She reports that she does not drink alcohol or use drugs.     Medications    Current Outpatient Medications:     simvastatin (ZOCOR) 20 MG tablet, TAKE 1 TABLET BY MOUTH EVERY DAY, Disp: 90 tablet, Rfl: 3    lisinopril (PRINIVIL;ZESTRIL) 20 MG tablet, TAKE 1 TABLET BY MOUTH EVERY DAY, Disp: 90 tablet, Rfl: 3    pantoprazole (PROTONIX) 40 MG tablet, , Disp: , Rfl:     ibuprofen (ADVIL;MOTRIN) 200 MG tablet, Take 200 mg by mouth every 6 hours as needed for Pain, Disp: , Rfl:     amLODIPine (NORVASC) 2.5 MG tablet, Take 1 tablet by mouth daily, Disp: 90 tablet, Rfl: 3    Cholecalciferol (VITAMIN D3) 5000 UNITS CAPS, Take 1 capsule by mouth daily. , Disp: , Rfl:     aspirin 81 MG tablet, Take 81 mg by mouth daily. , Disp: , Rfl:     calcium carbonate 600 MG TABS tablet, Take 1 tablet by mouth 2 times daily. , Disp: , Rfl:     Subjective:      Review of Systems   Constitutional: Positive for activity change. Negative for chills, diaphoresis, fatigue and fever. Genitourinary: Negative for difficulty urinating, frequency and urgency. Musculoskeletal: Positive for arthralgias, back pain and myalgias. Negative for gait problem, neck pain and neck stiffness. Skin: Negative for color change, rash and wound. Allergic/Immunologic: Negative for environmental allergies and food allergies. Neurological: Negative for dizziness, seizures, light-headedness, numbness and headaches. Hematological: Does not bruise/bleed easily. Psychiatric/Behavioral: Negative for sleep disturbance. The patient is not nervous/anxious. Objective:     Vitals:    08/17/20 0900   BP: 122/70   Site: Left Upper Arm   Position: Sitting   Cuff Size: Medium Adult   Pulse: 70   Temp: 97.4 °F (36.3 °C)   TempSrc: Temporal   Weight: 186 lb (84.4 kg)   Height: 5' 2\" (1.575 m)       Physical Exam  Vitals signs reviewed. Constitutional:       General: She is not in acute distress. Appearance: She is well-developed. She is not diaphoretic. HENT:      Head: Normocephalic and atraumatic. Not macrocephalic and not microcephalic.       Right Ear: External ear normal.      Left Ear: External ear normal.   Eyes:      General: Right eye: No discharge. Left eye: No discharge. Conjunctiva/sclera: Conjunctivae normal.   Neck:      Trachea: No tracheal deviation. Pulmonary:      Effort: Pulmonary effort is normal. No respiratory distress. Musculoskeletal:         General: Tenderness present. Lumbar back: She exhibits decreased range of motion, tenderness, pain and spasm. Back:    Skin:     General: Skin is warm and dry. Coloration: Skin is not pale. Findings: No rash. Neurological:      Mental Status: She is alert and oriented to person, place, and time. Cranial Nerves: No cranial nerve deficit. Psychiatric:         Attention and Perception: She is attentive. Speech: Speech normal.         Behavior: Behavior normal.         Thought Content: Thought content normal.         Judgment: Judgment normal.            Assessment:     1. Spondylosis of lumbar region without myelopathy or radiculopathy    2. Lumbar pain    3. Chronic pain syndrome            Plan:      · OARRS reviewed. Current MED: 0  · Patient was not offered naloxone for home. · Discussed long term side effects of medications, tolerance, dependency and addiction. · Previous UDS reviewed  · Patient told can not receive any pain medications from any other source. · No evidence of abuse, diversion or aberrant behavior.  Medications and/or procedures to improve function and quality of life- patient understanding with this and that may not be pain free   Discussed with patient about safe storage of medications at home   Discussed possible weaning of medication dosing dependent on treatment/procedure results.  Testing: none   Procedures: none a this time.  Discussed with patient about risks with procedure including infection, reaction to medication, increased pain, or bleeding.  Medications: elavil 10mg nightly - patient to call next week with lilliam Pringle for dietician consult when patient ready      Meds. Prescribed:   No orders of the defined types were placed in this encounter. Return in about 2 months (around 10/17/2020).            Electronically signed by SHERRY Mckinley CNP on8/17/2020 at 9:27 AM

## 2020-10-12 ENCOUNTER — OFFICE VISIT (OUTPATIENT)
Dept: PHYSICAL MEDICINE AND REHAB | Age: 70
End: 2020-10-12
Payer: MEDICARE

## 2020-10-12 VITALS
SYSTOLIC BLOOD PRESSURE: 118 MMHG | BODY MASS INDEX: 34.23 KG/M2 | HEIGHT: 62 IN | DIASTOLIC BLOOD PRESSURE: 72 MMHG | TEMPERATURE: 97.5 F | WEIGHT: 186 LBS

## 2020-10-12 PROCEDURE — 1123F ACP DISCUSS/DSCN MKR DOCD: CPT | Performed by: NURSE PRACTITIONER

## 2020-10-12 PROCEDURE — G8417 CALC BMI ABV UP PARAM F/U: HCPCS | Performed by: NURSE PRACTITIONER

## 2020-10-12 PROCEDURE — G8400 PT W/DXA NO RESULTS DOC: HCPCS | Performed by: NURSE PRACTITIONER

## 2020-10-12 PROCEDURE — 1090F PRES/ABSN URINE INCON ASSESS: CPT | Performed by: NURSE PRACTITIONER

## 2020-10-12 PROCEDURE — 99213 OFFICE O/P EST LOW 20 MIN: CPT | Performed by: NURSE PRACTITIONER

## 2020-10-12 PROCEDURE — 4040F PNEUMOC VAC/ADMIN/RCVD: CPT | Performed by: NURSE PRACTITIONER

## 2020-10-12 PROCEDURE — G8484 FLU IMMUNIZE NO ADMIN: HCPCS | Performed by: NURSE PRACTITIONER

## 2020-10-12 PROCEDURE — G8427 DOCREV CUR MEDS BY ELIG CLIN: HCPCS | Performed by: NURSE PRACTITIONER

## 2020-10-12 PROCEDURE — 1036F TOBACCO NON-USER: CPT | Performed by: NURSE PRACTITIONER

## 2020-10-12 PROCEDURE — 3017F COLORECTAL CA SCREEN DOC REV: CPT | Performed by: NURSE PRACTITIONER

## 2020-10-12 ASSESSMENT — ENCOUNTER SYMPTOMS
COLOR CHANGE: 0
BACK PAIN: 1

## 2020-10-12 NOTE — PROGRESS NOTES
has a past medical history of Adenomatous colon polyp, Chronic gastritis, Coronary artery spasm (HCC), GERD (gastroesophageal reflux disease), Hiatal hernia, Hyperlipidemia, Hypertension, MVP (mitral valve prolapse), Obesity, Class II, BMI 35-39.9, with comorbidity, Osteopenia, Scoliosis (and kyphoscoliosis), idiopathic, and Vitamin D deficiency. Past Surgical History  The patient  has a past surgical history that includes Carpal tunnel release (2007); Hand surgery (2008); Upper gastrointestinal endoscopy (10/2013); Cardiac catheterization (2008); Cardiac catheterization (12/18/2018); Endoscopy, colon, diagnostic (01/27/2019); Nerve Block Lumb Facet Level 1 Bilateral (Bilateral, 2/4/2019); Nerve Block Lumb Facet Level 1 Bilateral (Bilateral, 3/25/2019); Lumbar spine surgery (Right, 5/20/2019); Lumbar spine surgery (Left, 6/17/2019); epidural steroid injection (Bilateral, 8/1/2019); epidural steroid injection (Bilateral, 9/19/2019); Lumbar spine surgery (Right, 12/2/2019); Pain management procedure (Left, 6/15/2020); and Pain management procedure (Right, 7/31/2020). Family History  This patient's family history includes Alzheimer's Disease in her mother; Heart Attack in her brother and father; Heart Disease in her brother and father; High Blood Pressure in her father and mother. Social History  Annabella Mehta  reports that she has never smoked. She has never used smokeless tobacco. She reports that she does not drink alcohol or use drugs.     Medications    Current Outpatient Medications:     lisinopril (PRINIVIL;ZESTRIL) 20 MG tablet, Take 1 tablet by mouth daily She needs seen for more, Disp: 30 tablet, Rfl: 0    simvastatin (ZOCOR) 20 MG tablet, TAKE 1 TABLET BY MOUTH EVERY DAY, Disp: 90 tablet, Rfl: 3    pantoprazole (PROTONIX) 40 MG tablet, , Disp: , Rfl:     ibuprofen (ADVIL;MOTRIN) 200 MG tablet, Take 200 mg by mouth every 6 hours as needed for Pain, Disp: , Rfl:     Cholecalciferol (VITAMIN D3) 5000 UNITS CAPS, Take 1 capsule by mouth daily. , Disp: , Rfl:     aspirin 81 MG tablet, Take 81 mg by mouth daily. , Disp: , Rfl:     calcium carbonate 600 MG TABS tablet, Take 1 tablet by mouth 2 times daily. , Disp: , Rfl:     amitriptyline (ELAVIL) 10 MG tablet, Take 1 tablet by mouth nightly for 14 days, Disp: 14 tablet, Rfl: 0    amLODIPine (NORVASC) 2.5 MG tablet, Take 1 tablet by mouth daily, Disp: 90 tablet, Rfl: 3    Subjective:      Review of Systems   Constitutional: Positive for activity change. Negative for chills, diaphoresis, fatigue and fever. Genitourinary: Negative for difficulty urinating, frequency and urgency. Musculoskeletal: Positive for arthralgias, back pain and myalgias. Negative for gait problem, neck pain and neck stiffness. Skin: Negative for color change, rash and wound. Allergic/Immunologic: Negative for environmental allergies and food allergies. Neurological: Negative for dizziness, seizures, light-headedness, numbness and headaches. Hematological: Does not bruise/bleed easily. Psychiatric/Behavioral: Negative for sleep disturbance. The patient is not nervous/anxious. Objective:     Vitals:    10/12/20 0902   BP: 118/72   Temp: 97.5 °F (36.4 °C)   Weight: 186 lb (84.4 kg)   Height: 5' 2\" (1.575 m)       Physical Exam  Vitals signs reviewed. Constitutional:       General: She is not in acute distress. Appearance: She is well-developed. She is not diaphoretic. HENT:      Head: Normocephalic and atraumatic. Not macrocephalic and not microcephalic. Right Ear: External ear normal.      Left Ear: External ear normal.   Eyes:      General:         Right eye: No discharge. Left eye: No discharge. Conjunctiva/sclera: Conjunctivae normal.   Neck:      Trachea: No tracheal deviation. Pulmonary:      Effort: Pulmonary effort is normal. No respiratory distress. Musculoskeletal:         General: Tenderness present.       Lumbar back: She exhibits decreased range of motion, tenderness, pain and spasm. Back:    Skin:     General: Skin is warm and dry. Coloration: Skin is not pale. Findings: No rash. Neurological:      Mental Status: She is alert and oriented to person, place, and time. Cranial Nerves: No cranial nerve deficit. Psychiatric:         Attention and Perception: She is attentive. Speech: Speech normal.         Behavior: Behavior normal.         Thought Content: Thought content normal.         Judgment: Judgment normal.            Assessment:     1. Lumbar stenosis with neurogenic claudication    2. Bulging lumbar disc    3. 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 Lumbar pain    5. Chronic pain syndrome            Plan:      · OARRS reviewed. Current MED: 0  · Patient was not offered naloxone for home. · Discussed long term side effects of medications, tolerance, dependency and addiction. · Previous UDS reviewed  · Patient told can not receive any pain medications from any other source. · No evidence of abuse, diversion or aberrant behavior.  Medications and/or procedures to improve function and quality of life- patient understanding with this and that may not be pain free   Discussed with patient about safe storage of medications at home   Discussed possible weaning of medication dosing dependent on treatment/procedure results.  Testing: consideration for MRI depending on LESI results.  Procedures: LESI L4   Discussed with patient about risks with procedure including infection, reaction to medication, increased pain, or bleeding.  Medications: ibuprofen PRN       Meds. Prescribed:   No orders of the defined types were placed in this encounter. Return for  LESI L4, follow up after procedure.            Electronically signed by SHERRY Angulo CNP on10/12/2020 at 9:21 AM

## 2020-10-12 NOTE — PATIENT INSTRUCTIONS
 Testing: consideration for MRI depending on LESI results.  Procedures: LESI L4   Discussed with patient about risks with procedure including infection, reaction to medication, increased pain, or bleeding.    Medications: ibuprofen PRN

## 2020-10-29 ENCOUNTER — APPOINTMENT (OUTPATIENT)
Dept: GENERAL RADIOLOGY | Age: 70
End: 2020-10-29
Attending: PAIN MEDICINE
Payer: MEDICARE

## 2020-10-29 ENCOUNTER — HOSPITAL ENCOUNTER (OUTPATIENT)
Age: 70
Setting detail: OUTPATIENT SURGERY
Discharge: HOME | End: 2020-10-29
Attending: PAIN MEDICINE | Admitting: PAIN MEDICINE
Payer: MEDICARE

## 2020-10-29 ENCOUNTER — ANESTHESIA (OUTPATIENT)
Dept: OPERATING ROOM | Age: 70
End: 2020-10-29
Payer: MEDICARE

## 2020-10-29 ENCOUNTER — ANESTHESIA EVENT (OUTPATIENT)
Dept: OPERATING ROOM | Age: 70
End: 2020-10-29
Payer: MEDICARE

## 2020-10-29 VITALS
HEIGHT: 62 IN | DIASTOLIC BLOOD PRESSURE: 60 MMHG | HEART RATE: 76 BPM | BODY MASS INDEX: 34.37 KG/M2 | WEIGHT: 186.8 LBS | SYSTOLIC BLOOD PRESSURE: 97 MMHG | RESPIRATION RATE: 16 BRPM | OXYGEN SATURATION: 95 % | TEMPERATURE: 98 F

## 2020-10-29 VITALS
SYSTOLIC BLOOD PRESSURE: 137 MMHG | DIASTOLIC BLOOD PRESSURE: 63 MMHG | RESPIRATION RATE: 11 BRPM | OXYGEN SATURATION: 99 %

## 2020-10-29 PROCEDURE — 3600000054 HC PAIN LEVEL 3 BASE: Performed by: PAIN MEDICINE

## 2020-10-29 PROCEDURE — 6360000002 HC RX W HCPCS: Performed by: PAIN MEDICINE

## 2020-10-29 PROCEDURE — 2500000003 HC RX 250 WO HCPCS: Performed by: PAIN MEDICINE

## 2020-10-29 PROCEDURE — 2580000003 HC RX 258: Performed by: PAIN MEDICINE

## 2020-10-29 PROCEDURE — 2709999900 HC NON-CHARGEABLE SUPPLY: Performed by: PAIN MEDICINE

## 2020-10-29 PROCEDURE — 62323 NJX INTERLAMINAR LMBR/SAC: CPT | Performed by: PAIN MEDICINE

## 2020-10-29 PROCEDURE — 7100000011 HC PHASE II RECOVERY - ADDTL 15 MIN: Performed by: PAIN MEDICINE

## 2020-10-29 PROCEDURE — 6360000002 HC RX W HCPCS: Performed by: NURSE ANESTHETIST, CERTIFIED REGISTERED

## 2020-10-29 PROCEDURE — 3209999900 FLUORO FOR SURGICAL PROCEDURES

## 2020-10-29 PROCEDURE — 7100000010 HC PHASE II RECOVERY - FIRST 15 MIN: Performed by: PAIN MEDICINE

## 2020-10-29 PROCEDURE — 6360000004 HC RX CONTRAST MEDICATION: Performed by: PAIN MEDICINE

## 2020-10-29 PROCEDURE — 3700000000 HC ANESTHESIA ATTENDED CARE: Performed by: PAIN MEDICINE

## 2020-10-29 RX ORDER — PROPOFOL 10 MG/ML
INJECTION, EMULSION INTRAVENOUS PRN
Status: DISCONTINUED | OUTPATIENT
Start: 2020-10-29 | End: 2020-10-29 | Stop reason: SDUPTHER

## 2020-10-29 RX ORDER — DEXAMETHASONE SODIUM PHOSPHATE 4 MG/ML
INJECTION, SOLUTION INTRA-ARTICULAR; INTRALESIONAL; INTRAMUSCULAR; INTRAVENOUS; SOFT TISSUE PRN
Status: DISCONTINUED | OUTPATIENT
Start: 2020-10-29 | End: 2020-10-29 | Stop reason: ALTCHOICE

## 2020-10-29 RX ORDER — LIDOCAINE HYDROCHLORIDE 10 MG/ML
INJECTION, SOLUTION INFILTRATION; PERINEURAL PRN
Status: DISCONTINUED | OUTPATIENT
Start: 2020-10-29 | End: 2020-10-29 | Stop reason: ALTCHOICE

## 2020-10-29 RX ORDER — SODIUM CHLORIDE 9 MG/ML
INJECTION INTRAVENOUS PRN
Status: DISCONTINUED | OUTPATIENT
Start: 2020-10-29 | End: 2020-10-29 | Stop reason: ALTCHOICE

## 2020-10-29 RX ADMIN — PROPOFOL 50 MG: 10 INJECTION, EMULSION INTRAVENOUS at 11:00

## 2020-10-29 RX ADMIN — PROPOFOL 20 MG: 10 INJECTION, EMULSION INTRAVENOUS at 11:04

## 2020-10-29 RX ADMIN — PROPOFOL 20 MG: 10 INJECTION, EMULSION INTRAVENOUS at 11:01

## 2020-10-29 ASSESSMENT — PULMONARY FUNCTION TESTS
PIF_VALUE: 0

## 2020-10-29 ASSESSMENT — PAIN DESCRIPTION - DESCRIPTORS: DESCRIPTORS: CONSTANT

## 2020-10-29 ASSESSMENT — PAIN - FUNCTIONAL ASSESSMENT: PAIN_FUNCTIONAL_ASSESSMENT: 0-10

## 2020-10-29 ASSESSMENT — PAIN SCALES - GENERAL: PAINLEVEL_OUTOF10: 0

## 2020-10-29 NOTE — H&P
H&P    Patient here today for follow up. Patient states that she feels that the low back pain is increasing some. Last year, a combination of the RFA and LESI did help most. Discussed with patient today. Also discussed LESI then possible MRI for updated imaging if needed.      \"Lumbar epidural steroid injection under fluoroscopy guidance # 2 at L4 with Dr Karoline Vuong on 9/19/19. Patient states day one was 80% relief but now down to 65-70% relief. Patient states that it takes longer for her to get \"misserable\". Working is better. Getting up in the morning is better, posture is also improved. \"     Medications reviewed.         The patientis allergic to codeine and pcn [penicillins].     Past Medical History  Patience Tello  has a past medical history of Adenomatous colon polyp, Chronic gastritis, Coronary artery spasm ([address] GERD (gastroesophageal reflux disease), Hiatal hernia, Hyperlipidemia, Hypertension, MVP (mitral valve prolapse), Obesity, Class II, BMI 35-39.9, with comorbidity, Osteopenia, Scoliosis (and kyphoscoliosis), idiopathic, and Vitamin D deficiency.     Past Surgical History  The patient  has a past surgical history that includes Carpal tunnel release (2007); Hand surgery (2008); Upper gastrointestinal endoscopy (10/2013); Cardiac catheterization (2008); Cardiac catheterization (12/18/2018); Endoscopy, colon, diagnostic (01/27/2019); Nerve Block Lumb Facet Level 1 Bilateral (Bilateral, 2/4/2019); Nerve Block Lumb Facet Level 1 Bilateral (Bilateral, 3/25/2019); Lumbar spine surgery (Right, 5/20/2019); Lumbar spine surgery (Left, 6/17/2019); epidural steroid injection (Bilateral, 8/1/2019); epidural steroid injection (Bilateral, 9/19/2019); Lumbar spine surgery (Right, 12/2/2019); Pain management procedure (Left, 6/15/2020); and Pain management procedure (Right, 7/31/2020).      Family History  This patient's family history includes Alzheimer's Disease in her mother; Heart Attack in her brother and father; Heart Disease in her brother and father; High Blood Pressure in her father and mother.  Jai Tirado  reports that she has never smoked. She has never used smokeless tobacco. She reports that she does not drink alcohol or use drugs.     Medications  Current Medication     Current Outpatient Medications:     lisinopril (PRINIVIL;ZESTRIL) 20 MG tablet, Take 1 tablet by mouth daily She needs seen for more, Disp: 30 tablet, Rfl: 0    simvastatin (ZOCOR) 20 MG tablet, TAKE 1 TABLET BY MOUTH EVERY DAY, Disp: 90 tablet, Rfl: 3    pantoprazole (PROTONIX) 40 MG tablet, , Disp: , Rfl:     ibuprofen (ADVIL;MOTRIN) 200 MG tablet, Take 200 mg by mouth every 6 hours as needed for Pain, Disp: , Rfl:     Cholecalciferol (VITAMIN D3) 5000 UNITS CAPS, Take 1 capsule by mouth daily. , Disp: , Rfl:     aspirin 81 MG tablet, Take 81 mg by mouth daily. , Disp: , Rfl:     calcium carbonate 600 MG TABS tablet, Take 1 tablet by mouth 2 times daily. , Disp: , Rfl:     amitriptyline (ELAVIL) 10 MG tablet, Take 1 tablet by mouth nightly for 14 days, Disp: 14 tablet, Rfl: 0    amLODIPine (NORVASC) 2.5 MG tablet, Take 1 tablet by mouth daily, Disp: 90 tablet, Rfl: 3        Subjective:      Review of Systems   Constitutional: Positive for activity change. Negative for chills, diaphoresis, fatigue and fever. Genitourinary: Negative for difficulty urinating, frequency and urgency. Musculoskeletal: Positive for arthralgias, back pain and myalgias. Negative for gait problem, neck pain and neck stiffness. Skin: Negative for color change, rash and wound. Allergic/Immunologic: Negative for environmental allergies and food allergies. Neurological: Negative for dizziness, seizures, light-headedness, numbness and headaches. Hematological: Does not bruise/bleed easily. Psychiatric/Behavioral: Negative for sleep disturbance.  The patient is not nervous/anxious.          Objective:      Vitals       Vitals:     10/12/20 0902   BP: 118/72   Temp: 97.5 °F (36.4 °C)   Weight: 186 lb (84.4 kg)   Height: 5' 2\" (1.575 m)           Physical Exam  Vitals signs reviewed. Constitutional:       General: She is not in acute distress. Appearance: She is well-developed. She is not diaphoretic. HENT:      Head: Normocephalic and atraumatic. Not macrocephalic and not microcephalic. Right Ear: External ear normal.      Left Ear: External ear normal.   Eyes:      General:         Right eye: No discharge. Left eye: No discharge. Conjunctiva/sclera: Conjunctivae normal.   Neck:      Trachea: No tracheal deviation. Pulmonary:      Effort: Pulmonary effort is normal. No respiratory distress. Musculoskeletal:         General: Tenderness present. Lumbar back: She exhibits decreased range of motion, tenderness, pain and spasm. Back:    Skin:     General: Skin is warm and dry. Coloration: Skin is not pale. Findings: No rash. Neurological:      Mental Status: She is alert and oriented to person, place, and time. Cranial Nerves: No cranial nerve deficit. Psychiatric:         Attention and Perception: She is attentive. Speech: Speech normal.         Behavior: Behavior normal.         Thought Content: Thought content normal.         Judgment: Judgment normal.            Assessment:      1. Lumbar stenosis with neurogenic claudication    2. Bulging lumbar disc    3. 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 Lumbar pain    5. Chronic pain syndrome       Plan:      · OARRS reviewed. Current MED: 0  · Patient was not offered naloxone for home. · Discussed long term side effects of medications, tolerance, dependency and addiction. · Previous UDS reviewed  · Patient told can not receive any pain medications from any other source. · No evidence of abuse, diversion or aberrant behavior.   · Medications and/or procedures to improve function and quality of life- patient

## 2020-10-29 NOTE — PROGRESS NOTES
1107-Patient to Phase II via cart. Report received from [facility]. Patient drowsy but responsive. Vitals obtained and stable. Respirations even and unlabored on room air. Patient denies pain, nausea, numbness and tingling. Patient able to move all extremities. No drainage noted at injection sites. Patient instructed to stay in bed. Instructed on call light use. 1110-Patient provided with snack and drink. Denies needs. Call light in reach. 1120-IV removed with no complications. Patient getting dressed at bedside. 1130-Patient discharged in stable condition. All belongings given to patient. Patient ambulated to car with assistance from RN. Patient tolerated well.

## 2020-10-29 NOTE — ANESTHESIA PRE PROCEDURE
Department of Anesthesiology  Preprocedure Note       Name:  Adis Lara   Age:  78 y.o.  :  1950                                          MRN:  [id number]         Date:  10/29/2020      Surgeon: Haleigh Sorto):  Leila Ayala MD    Procedure: LESI L4    Medications prior to admission:   Prior to Admission medications    Medication Sig Start Date End Date Taking? Authorizing Provider   lisinopril (PRINIVIL;ZESTRIL) 20 MG tablet TAKE 1 TABLET BY MOUTH DAILY SHE NEEDS SEEN FOR MORE 10/26/20   Calvin Morgan MD   amitriptyline (ELAVIL) 10 MG tablet Take 1 tablet by mouth nightly for 14 days 20  SHERRY Toribio CNP   simvastatin (ZOCOR) 20 MG tablet TAKE 1 TABLET BY MOUTH EVERY DAY 19   Calvin Morgan MD   pantoprazole (PROTONIX) 40 MG tablet  19   Historical Provider, MD   ibuprofen (ADVIL;MOTRIN) 200 MG tablet Take 200 mg by mouth every 6 hours as needed for Pain    Historical Provider, MD   amLODIPine (NORVASC) 2.5 MG tablet Take 1 tablet by mouth daily 10/9/18 8/17/20  Calvin Morgan MD   Cholecalciferol (VITAMIN D3) 5000 UNITS CAPS Take 1 capsule by mouth daily. Calvin Morgan MD   aspirin 81 MG tablet Take 81 mg by mouth daily. Historical Provider, MD   calcium carbonate 600 MG TABS tablet Take 1 tablet by mouth 2 times daily. Historical Provider, MD       Current medications:    No current outpatient medications on file. No current facility-administered medications for this visit. Allergies:     Allergies   Allergen Reactions    Codeine Swelling    Pcn [Penicillins] Rash       Problem List:    Patient Active Problem List   Diagnosis Code    Anxiety and depression F41.9, F32.9    Obesity, Class II, BMI 35-39.9, with comorbidity [id number]    Osteopenia M85.80    Vitamin D deficiency E55.9    Chronic gastritis K29.50    Tonsillar hypertrophy J35.1    Globus pharyngeus R09.89    Disease of larynx J38.7    GERD (gastroesophageal reflux disease) K21.9    Dysphagia R13.10    Nasal septal deviation J34.2    Epistaxis R04.0    Nasal mucositis (ulcerative) J34.81    Multiple thyroid nodules E04.2    Pure hypercholesterolemia E78.00    Essential hypertension I10    Lumbar spondylosis M47.816    Spondylosis of lumbar region without myelopathy or radiculopathy M47.816    Bulging lumbar disc M51.26       Past Medical History:        Diagnosis Date    Adenomatous colon polyp 2012    Chronic gastritis 2013    Coronary artery spasm (HCC)     GERD (gastroesophageal reflux disease) 2002    Hiatal hernia 01/27/2019    Hyperlipidemia     Hypertension 2001    MVP (mitral valve prolapse)     Obesity, Class II, BMI 35-39.9, with comorbidity 2012    Osteopenia 10/2012    Scoliosis (and kyphoscoliosis), idiopathic     Vitamin D deficiency 2012       Past Surgical History:        Procedure Laterality Date    CARDIAC CATHETERIZATION  2008    CARDIAC CATHETERIZATION  12/18/2018    CARPAL TUNNEL RELEASE  2007    right    ENDOSCOPY, COLON, DIAGNOSTIC  01/27/2019    esophagus stretching    EPIDURAL STEROID INJECTION Bilateral 8/1/2019    LESI L4 performed by Mau Selby MD at [facility] 2 Bilateral 9/19/2019    LESI L4 or other level #2 performed by Mau Selby MD at [address] [place] [address] SURGERY  2008    left trigger finger    LUMBAR SPINE SURGERY Right 5/20/2019    LUMBAR RFA, L2-3, L3-4, L4-5, RIGHT SIDE performed by Mau Selby MD at [address]"" Left 6/17/2019    LUMBAR RFA L2-3, L3-4, L4-5, LEFT SIDE performed by Mau Selby MD at [address]"" Right 12/2/2019    Lumbar RFA L2-3,L3-4 and L4-5 right performed by Mau Selby MD at [address] [place] [address], [address] [place] [address] LUMB FACET LEVEL 1 BILATERAL Bilateral 2/4/2019    LUMBAR FACET MBB @ L2-3,  L3-4, L4-5   BILATERAL performed by Bharat Sams MD at [facility] 1 BILATERAL Bilateral 3/25/2019    LUMBAR FACET MBB @ L2-3,  L3-4,  L4-5   BILATERAL performed by Bharat Sams MD at [facility] Left 6/15/2020    Lumbar RFA  L2-3,L3-4 and L4-5 left, performed by Bharat Sams MD at [facility] Right 7/31/2020    Lumbar RFA L2-3,L3-4 and L4-5 right performed by Bharat Sams MD at [address] [place] [address]  10/2013       Social History:    Social History     Tobacco Use    Smoking status: Never Smoker    Smokeless tobacco: Never Used   Substance Use Topics    Alcohol use: No                                Counseling given: Not Answered      Vital Signs (Current): There were no vitals filed for this visit.                                            BP Readings from Last 3 Encounters:   10/12/20 118/72   08/17/20 122/70   07/31/20 105/63       NPO Status:                                                                                 BMI:   Wt Readings from Last 3 Encounters:   10/12/20 186 lb (84.4 kg)   08/17/20 186 lb (84.4 kg)   07/31/20 186 lb (84.4 kg)     There is no height or weight on file to calculate BMI.    CBC:   Lab Results   Component Value Date    WBC 6.9 08/30/2019    RBC 4.19 08/30/2019    HGB 12.5 08/30/2019    HCT 37.6 08/30/2019    MCV 89.8 08/30/2019    RDW 14.1 08/30/2019     08/30/2019       CMP:   Lab Results   Component Value Date     08/30/2019    K 3.8 08/30/2019     08/30/2019    CO2 26 08/30/2019    BUN 16 08/30/2019    CREATININE 0.67 08/30/2019    AGRATIO 1.7 08/30/2019    LABGLOM 72 04/10/2018    GLUCOSE 112 08/30/2019    PROT 6.5 08/30/2019    CALCIUM 8.90 08/30/2019    BILITOT 0.5 08/30/2019    ALKPHOS 87 08/30/2019    AST 22 08/30/2019    ALT 19 08/30/2019       POC Tests: No results for input(s): POCGLU, POCNA, POCK, POCCL, POCBUN, POCHEMO, POCHCT in the last 72 hours. Coags:   Lab Results   Component Value Date    PROTIME 12.0 12/14/2018    INR 1.04 12/14/2018       HCG (If Applicable): No results found for: PREGTESTUR, PREGSERUM, HCG, HCGQUANT     ABGs: No results found for: PHART, PO2ART, SQY0DTH, MRD3PZR, BEART, N7LQUHMA     Type & Screen (If Applicable):  No results found for: LABABO, LABRH    Drug/Infectious Status (If Applicable):  No results found for: HIV, HEPCAB    COVID-19 Screening (If Applicable):   Lab Results   Component Value Date    COVID19 Not Detected 07/27/2020         Anesthesia Evaluation   no history of anesthetic complications:   Airway: Mallampati: II  TM distance: >3 FB   Neck ROM: full  Mouth opening: > = 3 FB Dental:          Pulmonary:   (+) decreased breath sounds,            Patient did not smoke on day of surgery. Cardiovascular:  Exercise tolerance: good (>4 METS),   (+) hypertension:, CAD:,         Rhythm: regular                      Neuro/Psych:   (+) psychiatric history:            GI/Hepatic/Renal:   (+) hiatal hernia, GERD:,           Endo/Other:              Pt had no PAT visit       Abdominal:   (+) obese,         Vascular:                                          Anesthesia Plan      MAC     ASA 3       Induction: intravenous. Anesthetic plan and risks discussed with patient. Plan discussed with CRNA.                   Betty Arreguin MD   10/29/2020

## 2020-10-29 NOTE — H&P
[address] [address]  History and Physical Update    Pt Name: Dilcia Jean  MRN: [id number]  YOB: 1950  Date of evaluation: 10/29/2020      I have examined the patient and reviewed the H&P/Consult and there are no changes to the patient or plans.         Electronically signed by Rojas Huitron MD on 10/29/2020 at 10:10 AM

## 2020-10-29 NOTE — OP NOTE
Operative Note    Pre-Procedure Note    Patient Name: Amanda Arce   YOB: 1950  Medical Record Number: [id number]  Date: 10/29/20       Indication:  L-bulging disc  Consent: On file. Vital Signs:   Vitals:    10/29/20 1020   BP: (!) 130/58   Pulse: 68   Resp: 16   Temp: 97.7 °F (36.5 °C)   SpO2: 96%       Past Medical History:   has a past medical history of Adenomatous colon polyp, Chronic gastritis, Coronary artery spasm (HCC), GERD (gastroesophageal reflux disease), Hiatal hernia, Hyperlipidemia, Hypertension, MVP (mitral valve prolapse), Obesity, Class II, BMI 35-39.9, with comorbidity, Osteopenia, Scoliosis (and kyphoscoliosis), idiopathic, and Vitamin D deficiency. Past Surgical History:   has a past surgical history that includes Carpal tunnel release (2007); Hand surgery (2008); Upper gastrointestinal endoscopy (10/2013); Cardiac catheterization (2008); Cardiac catheterization (12/18/2018); Endoscopy, colon, diagnostic (01/27/2019); Nerve Block Lumb Facet Level 1 Bilateral (Bilateral, 2/4/2019); Nerve Block Lumb Facet Level 1 Bilateral (Bilateral, 3/25/2019); Lumbar spine surgery (Right, 5/20/2019); Lumbar spine surgery (Left, 6/17/2019); epidural steroid injection (Bilateral, 8/1/2019); epidural steroid injection (Bilateral, 9/19/2019); Lumbar spine surgery (Right, 12/2/2019); Pain management procedure (Left, 6/15/2020); and Pain management procedure (Right, 7/31/2020). Pre-Sedation Documentation and Exam:   Vital signs have been reviewed (see flow sheet for vitals). Sedation/Anesthesia Plan:   MAC    Medications Planned:   Per Anesthesia. Patient is an appropriate candidate for plan of sedation: yes       Preoperative Diagnosis:  L-bulging disc     Post-Op Dx: as above     Procedure Performed:  Lumbar epidural steroid injection under fluoroscopy guidance # 1 0f 2020.    .  Indication for the Procedure:  The patient failed conservative management  for pain in the low back radiating to lower extremities.  The patient is undergoing lumbar epidural steroid injection.  As the patient is not responding to conservative management and it is interfering with activities of daily living we decided to proceed with lumbar epidural steroid injection. The procedure and risks were discussed with the patient and an informed consent was obtained     Procedure:     The patient is placed in prone position. Skin over the back was prepped and draped in sterile manner. Then using fluoroscopy the L4 interspace was observed and the skin and deep tissues in the right paramedian area were infiltrated with 3 ml of 1% lidocaine. The #20-gauge, 3-1/2 inch Tuohy needle was inserted through the skin wheal and the epidural space was identified using loss of resistance technique . This was confirmed with AP and lateral views using fluoroscopy after injecting about 2 ml of Omnipaque-180 and observing the spread of the contrast in the epidural space. Then after negative aspiration a total of 12 mg of dexamethasone with 4 ml of normal saline was injected into the epidural space. The needle is removed and a Band-Aid was placed over the needle insertion site. No paresthesia. Patient's vital signs remained stable and the patient tolerated the procedure well.   The patient will be discharged home in stable condition and will be followed in the office in the next few weeks for further planning.     Electronically signed by Gabriella Singh MD on 10/29/20 at 10:59 AM EDT

## 2020-10-30 ENCOUNTER — TELEPHONE (OUTPATIENT)
Dept: PHYSICAL MEDICINE AND REHAB | Age: 70
End: 2020-10-30

## 2020-10-30 NOTE — TELEPHONE ENCOUNTER
Patient called asking about the gabapentin. Wanting to know if this is something that she can try and now? Was supposed to in the past about 5 years ago be taking the gabapentin 300 mg TID. Procedure last, had ordered a sit and decompress that sits in the door way and has a harness. Is this something that she could still continue to use after procedure? Is on the way to [place] now and will be there till Thanksgiving. Will need Dr. Odell Hint to sign. Please advise.

## 2020-11-02 ENCOUNTER — VIRTUAL VISIT (OUTPATIENT)
Dept: PHYSICAL MEDICINE AND REHAB | Age: 70
End: 2020-11-02
Payer: MEDICARE

## 2020-11-02 PROCEDURE — 1123F ACP DISCUSS/DSCN MKR DOCD: CPT | Performed by: NURSE PRACTITIONER

## 2020-11-02 PROCEDURE — 3017F COLORECTAL CA SCREEN DOC REV: CPT | Performed by: NURSE PRACTITIONER

## 2020-11-02 PROCEDURE — 99213 OFFICE O/P EST LOW 20 MIN: CPT | Performed by: NURSE PRACTITIONER

## 2020-11-02 PROCEDURE — 1090F PRES/ABSN URINE INCON ASSESS: CPT | Performed by: NURSE PRACTITIONER

## 2020-11-02 PROCEDURE — G8428 CUR MEDS NOT DOCUMENT: HCPCS | Performed by: NURSE PRACTITIONER

## 2020-11-02 PROCEDURE — G8400 PT W/DXA NO RESULTS DOC: HCPCS | Performed by: NURSE PRACTITIONER

## 2020-11-02 PROCEDURE — 4040F PNEUMOC VAC/ADMIN/RCVD: CPT | Performed by: NURSE PRACTITIONER

## 2020-11-02 RX ORDER — GABAPENTIN 300 MG/1
300 CAPSULE ORAL 2 TIMES DAILY
Qty: 60 CAPSULE | Refills: 0 | Status: SHIPPED | OUTPATIENT
Start: 2020-11-02 | End: 2020-11-30 | Stop reason: SDUPTHER

## 2020-11-02 ASSESSMENT — ENCOUNTER SYMPTOMS
BACK PAIN: 1
COLOR CHANGE: 0

## 2020-11-02 NOTE — PROGRESS NOTES
TELEHEALTH EVALUATION -- Audio/Visual (During EDJWR-99 public health emergency)      This service is provided through Telehealth. Patient reports that place of service was home. Provider was located at their officeEvaluation of the following organ systems was limited: Vitals/Constitutional/EENT/Resp/CV/GI//MS/Neuro/Skin/Heme-Lymph-Imm. Pursuant to the emergency declaration under the [address] [place] [address], [address] [place] [address] and the [employer] Act, this Virtual Visit was conducted with patient's (and/or legal guardian's) consent, to reduce the patient's risk of exposure to COVID-19 and provide necessary medical care. This Telehealth audio/visual visit was conducted using [employer]. Patient identification was verified at the start of this visit: yes          HPI:   Melquiades Boykin is a 78 y.o. female is here today for    Chief Complaint: Low back pain    HPI     LESI L4 with Dr uDstin Pickett on 10/29/2020. Patient states that this has helped a little bit, patient states bending has been easier, no longer has shooting pain with bending. Patient states about 50%+ relief thus far. Patient states in combination with RFA about 50-60% relief. Patient in [place] until thanksgiving     Patient asking about trial of gabapentin, patient had been on years ago at 300mg TID. Patient asking about decompression device that hangs in a doorway, would not encourage this. Medications reviewed. The patientis allergic to codeine and pcn [penicillins]. Past Medical History  Misael Kwan  has a past medical history of Adenomatous colon polyp, Chronic gastritis, Coronary artery spasm ([address] GERD (gastroesophageal reflux disease), Hiatal hernia, Hyperlipidemia, Hypertension, MVP (mitral valve prolapse), Obesity, Class II, BMI 35-39.9, with comorbidity, Osteopenia, Scoliosis (and kyphoscoliosis), idiopathic, and Vitamin D deficiency.     Past Surgical Cholecalciferol (VITAMIN D3) 5000 UNITS CAPS, Take 1 capsule by mouth daily. , Disp: , Rfl:     aspirin 81 MG tablet, Take 81 mg by mouth daily. , Disp: , Rfl:     calcium carbonate 600 MG TABS tablet, Take 1 tablet by mouth 2 times daily. , Disp: , Rfl:     Subjective:      Review of Systems   Constitutional: Positive for activity change. Negative for chills, diaphoresis, fatigue and fever. Genitourinary: Negative for difficulty urinating, frequency and urgency. Musculoskeletal: Positive for arthralgias, back pain and myalgias. Negative for gait problem, neck pain and neck stiffness. Skin: Negative for color change, rash and wound. Allergic/Immunologic: Negative for environmental allergies and food allergies. Neurological: Negative for dizziness, seizures, light-headedness, numbness and headaches. Hematological: Does not bruise/bleed easily. Psychiatric/Behavioral: Negative for sleep disturbance. The patient is not nervous/anxious. Objective: There were no vitals filed for this visit. Physical Exam  Constitutional:       General: She is awake. She is not in acute distress. Appearance: Normal appearance. She is well-developed and normal weight. She is not ill-appearing or toxic-appearing. HENT:      Head: Normocephalic and atraumatic. No raccoon eyes. Pulmonary:      Effort: Pulmonary effort is normal. No respiratory distress. Skin:     Coloration: Skin is not ashen, cyanotic, jaundiced or pale. Neurological:      General: No focal deficit present. Mental Status: She is alert and oriented to person, place, and time. Cranial Nerves: No cranial nerve deficit or facial asymmetry. Psychiatric:         Attention and Perception: Attention normal.         Mood and Affect: Mood and affect normal.         Speech: Speech normal.         Behavior: Behavior normal. Behavior is cooperative. Thought Content:  Thought content normal.         Cognition and Memory: Cognition normal.         Judgment: Judgment normal.          Assessment:     1. Lumbar stenosis with neurogenic claudication    2. Bulging lumbar disc    3. Chronic pain syndrome    4. Spondylosis of lumbar region without myelopathy or radiculopathy    5. Lumbar pain    6. SI (sacroiliac) joint inflammation ([facility] [address]            Plan:      · OARRS reviewed. Current MED: 0  · Patient was not offered naloxone for home. · Discussed long term side effects of medications, tolerance, dependency and addiction. · Patient told can not receive any pain medications from any other source. · No evidence of abuse, diversion or aberrant behavior.  Medications and/or procedures to improve function and quality of life- patient understanding with this and that may not be pain free   Discussed with patient about safe storage of medications at home   Discussed possible weaning of medication dosing dependent on treatment/procedure results.  Testing: none   Procedures: none   Discussed with patient about risks with procedure including infection, reaction to medication, increased pain, or bleeding.  Medications: gabapentin 300mg at night for 5 days, then increase to BID. Meds. Prescribed:   Orders Placed This Encounter   Medications    gabapentin (NEURONTIN) 300 MG capsule     Sig: Take 1 capsule by mouth 2 times daily for 30 days. Nightly for first 5 days, then BID     Dispense:  60 capsule     Refill:  0       Return for as scheduled.  .           Electronically signed by SHERRY Adams CNP on11/2/2020 at 2:34 PM

## 2020-11-03 PROBLEM — M47.816 LUMBAR SPONDYLOSIS: Status: RESOLVED | Noted: 2020-11-03 | Resolved: 2020-11-03

## 2020-11-30 ENCOUNTER — OFFICE VISIT (OUTPATIENT)
Dept: PHYSICAL MEDICINE AND REHAB | Age: 70
End: 2020-11-30
Payer: MEDICARE

## 2020-11-30 VITALS
TEMPERATURE: 97.2 F | HEIGHT: 62 IN | BODY MASS INDEX: 34.23 KG/M2 | WEIGHT: 186 LBS | DIASTOLIC BLOOD PRESSURE: 66 MMHG | SYSTOLIC BLOOD PRESSURE: 124 MMHG

## 2020-11-30 PROCEDURE — G8427 DOCREV CUR MEDS BY ELIG CLIN: HCPCS | Performed by: NURSE PRACTITIONER

## 2020-11-30 PROCEDURE — 4040F PNEUMOC VAC/ADMIN/RCVD: CPT | Performed by: NURSE PRACTITIONER

## 2020-11-30 PROCEDURE — G8484 FLU IMMUNIZE NO ADMIN: HCPCS | Performed by: NURSE PRACTITIONER

## 2020-11-30 PROCEDURE — 1123F ACP DISCUSS/DSCN MKR DOCD: CPT | Performed by: NURSE PRACTITIONER

## 2020-11-30 PROCEDURE — 99213 OFFICE O/P EST LOW 20 MIN: CPT | Performed by: NURSE PRACTITIONER

## 2020-11-30 PROCEDURE — G8417 CALC BMI ABV UP PARAM F/U: HCPCS | Performed by: NURSE PRACTITIONER

## 2020-11-30 PROCEDURE — 1036F TOBACCO NON-USER: CPT | Performed by: NURSE PRACTITIONER

## 2020-11-30 PROCEDURE — 3017F COLORECTAL CA SCREEN DOC REV: CPT | Performed by: NURSE PRACTITIONER

## 2020-11-30 PROCEDURE — 1090F PRES/ABSN URINE INCON ASSESS: CPT | Performed by: NURSE PRACTITIONER

## 2020-11-30 PROCEDURE — G8400 PT W/DXA NO RESULTS DOC: HCPCS | Performed by: NURSE PRACTITIONER

## 2020-11-30 RX ORDER — GABAPENTIN 300 MG/1
300 CAPSULE ORAL 3 TIMES DAILY
Qty: 90 CAPSULE | Refills: 0 | Status: SHIPPED | OUTPATIENT
Start: 2020-11-30 | End: 2020-12-16 | Stop reason: SINTOL

## 2020-11-30 ASSESSMENT — ENCOUNTER SYMPTOMS
BACK PAIN: 1
COLOR CHANGE: 0

## 2020-11-30 NOTE — PROGRESS NOTES
[facility]  [address]  Dept: [phone]  Dept Fax: [phone]: [phone]    Visit Date: 11/30/2020    Functionality Assessment/Goals Worksheet     On a scale of 0 (Does not Interfere) to 10 (Completely Interferes)     1. Which number describes how during the past week pain has interfered with       the following:  A. General Activity:  7  B. Mood: 7  C. Walking Ability:  8  D. Normal Work (Includes both work outside the home and housework):  8  E. Relations with Other People:   3  F. Sleep:   3  G. Enjoyment of Life:   8    2. Patient Prefers to Take their Pain Medications:     [x]  On a regular basis   []  Only when necessary    []  Does not take pain medications    3. What are the Patient's Goals/Expectations for Visiting Pain Management? []  Learn about my pain    [x]  Receive Medication   []  Physical Therapy     []  Treat Depression   [x]  Receive Injections    []  Treat Sleep   []  Deal with Anxiety and Stress   []  Treat Opoid Dependence/Addiction   []  Other:      HPI:   Gladys Oropeza is a 78 y.o. female is here today for    Chief Complaint: Low back pain    HPI     LESI (10/29/2020) and Lumbar RFA (Right 7/31/2020, Left 6/15/2020) with 60% relief. States that the left side is starting to act up and would like to have this repeated. Patient reports good days and bad days. Patient states taking gabapentin BID and states it seems to be helping. Patient reports no side effects with BID. Discussed TID. Medications reviewed. The patientis allergic to codeine and pcn [penicillins].     Past Medical History  Carroll Connelly  has a past medical history of Adenomatous colon polyp, Chronic gastritis, Coronary artery spasm ([facility] [address] GERD (gastroesophageal reflux disease), Hiatal hernia, Hyperlipidemia, Hypertension, MVP (mitral valve prolapse), Obesity, Class II, BMI 35-39.9, with comorbidity, Osteopenia, Scoliosis (and kyphoscoliosis), idiopathic, and Vitamin D deficiency. Past Surgical History  The patient  has a past surgical history that includes Carpal tunnel release (2007); Hand surgery (2008); Upper gastrointestinal endoscopy (10/2013); Cardiac catheterization (2008); Cardiac catheterization (12/18/2018); Endoscopy, colon, diagnostic (01/27/2019); Nerve Block Lumb Facet Level 1 Bilateral (Bilateral, 2/4/2019); Nerve Block Lumb Facet Level 1 Bilateral (Bilateral, 3/25/2019); Lumbar spine surgery (Right, 5/20/2019); Lumbar spine surgery (Left, 6/17/2019); epidural steroid injection (Bilateral, 8/1/2019); epidural steroid injection (Bilateral, 9/19/2019); Lumbar spine surgery (Right, 12/2/2019); Pain management procedure (Left, 6/15/2020); Pain management procedure (Right, 7/31/2020); and Pain management procedure (N/A, 10/29/2020). Family History  This patient's family history includes Alzheimer's Disease in her mother; Heart Attack in her brother and father; Heart Disease in her brother and father; High Blood Pressure in her father and mother. Social History  Chucho Sandoval  reports that she has never smoked. She has never used smokeless tobacco. She reports that she does not drink alcohol or use drugs. Medications    Current Outpatient Medications:     gabapentin (NEURONTIN) 300 MG capsule, Take 1 capsule by mouth 3 times daily for 30 days. , Disp: 90 capsule, Rfl: 0    lisinopril (PRINIVIL;ZESTRIL) 20 MG tablet, TAKE 1 TABLET BY MOUTH DAILY SHE NEEDS SEEN FOR MORE, Disp: 90 tablet, Rfl: 3    simvastatin (ZOCOR) 20 MG tablet, TAKE 1 TABLET BY MOUTH EVERY DAY, Disp: 90 tablet, Rfl: 3    pantoprazole (PROTONIX) 40 MG tablet, , Disp: , Rfl:     ibuprofen (ADVIL;MOTRIN) 200 MG tablet, Take 200 mg by mouth every 6 hours as needed for Pain, Disp: , Rfl:     Cholecalciferol (VITAMIN D3) 5000 UNITS CAPS, Take 1 capsule by mouth daily. , Disp: , Rfl:     aspirin 81 MG tablet, Take 81 mg by mouth daily. , Disp: , Rfl:     calcium carbonate 600 MG TABS tablet, Take 1 tablet by mouth 2 times daily. , Disp: , Rfl:     amitriptyline (ELAVIL) 10 MG tablet, Take 1 tablet by mouth nightly for 14 days, Disp: 14 tablet, Rfl: 0    amLODIPine (NORVASC) 2.5 MG tablet, Take 1 tablet by mouth daily, Disp: 90 tablet, Rfl: 3    Subjective:      Review of Systems   Constitutional: Positive for activity change. Negative for chills, diaphoresis, fatigue and fever. Genitourinary: Negative for difficulty urinating, frequency and urgency. Musculoskeletal: Positive for arthralgias, back pain and myalgias. Negative for gait problem, neck pain and neck stiffness. Skin: Negative for color change, rash and wound. Allergic/Immunologic: Negative for environmental allergies and food allergies. Neurological: Negative for dizziness, seizures, light-headedness, numbness and headaches. Hematological: Does not bruise/bleed easily. Psychiatric/Behavioral: Negative for sleep disturbance. The patient is not nervous/anxious. Objective:     Vitals:    11/30/20 0858   BP: 124/66   Temp: 97.2 °F (36.2 °C)   Weight: 186 lb (84.4 kg)   Height: 5' 2\" (1.575 m)       Physical Exam  Vitals signs reviewed. Constitutional:       General: She is not in acute distress. Appearance: She is well-developed. She is not diaphoretic. HENT:      Head: Normocephalic and atraumatic. Not macrocephalic and not microcephalic. Right Ear: External ear normal.      Left Ear: External ear normal.   Eyes:      General:         Right eye: No discharge. Left eye: No discharge. Conjunctiva/sclera: Conjunctivae normal.   Neck:      Trachea: No tracheal deviation. Pulmonary:      Effort: Pulmonary effort is normal. No respiratory distress. Musculoskeletal:         General: Tenderness present. Lumbar back: She exhibits decreased range of motion, tenderness, pain and spasm.         Back:    Skin:     General: Skin is warm and dry. Coloration: Skin is not pale. Findings: No rash. Neurological:      Mental Status: She is alert and oriented to person, place, and time. Cranial Nerves: No cranial nerve deficit. Psychiatric:         Attention and Perception: She is attentive. Speech: Speech normal.         Behavior: Behavior normal.         Thought Content: Thought content normal.         Judgment: Judgment normal.            Assessment:     1. Lumbar stenosis with neurogenic claudication    2. Bulging lumbar disc    3. 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 Lumbar pain    5. Chronic pain syndrome    6. SI (sacroiliac) joint inflammation ([facility] [address]            Plan:      · OARRS reviewed. Current MED: 0  · Patient was not offered naloxone for home. · Discussed long term side effects of medications, tolerance, dependency and addiction. · Previous UDS reviewed  · Patient told can not receive any pain medications from any other source. · No evidence of abuse, diversion or aberrant behavior.  Medications and/or procedures to improve function and quality of life- patient understanding with this and that may not be pain free   Discussed with patient about safe storage of medications at home   Discussed possible weaning of medication dosing dependent on treatment/procedure results.  Testing: none    Procedures: Lumbar RFA L2-3,L3-4 and L4-5 left after 12/15/2020   Discussed with patient about risks with procedure including infection, reaction to medication, increased pain, or bleeding.  Medications: Increase gabapentin to TID. Ok to use with Lidoderm patch. Meds. Prescribed:   Orders Placed This Encounter   Medications    gabapentin (NEURONTIN) 300 MG capsule     Sig: Take 1 capsule by mouth 3 times daily for 30 days.      Dispense:  90 capsule     Refill:  0       Return for Lumbar RFA L2-3, L3-4 and L4-5 left after 12/15/2020, follow up after procedure with Jayson Sharma .            Electronically signed by SHERRY Huddleston CNP on11/30/2020 at 9:29 AM

## 2020-12-15 ASSESSMENT — ENCOUNTER SYMPTOMS
SHORTNESS OF BREATH: 0
SINUS PRESSURE: 0
CONSTIPATION: 0

## 2020-12-16 ENCOUNTER — OFFICE VISIT (OUTPATIENT)
Dept: FAMILY MEDICINE CLINIC | Age: 70
End: 2020-12-16

## 2020-12-16 VITALS
TEMPERATURE: 97 F | WEIGHT: 188.5 LBS | HEART RATE: 68 BPM | RESPIRATION RATE: 12 BRPM | DIASTOLIC BLOOD PRESSURE: 60 MMHG | SYSTOLIC BLOOD PRESSURE: 112 MMHG | HEIGHT: 62 IN | BODY MASS INDEX: 34.69 KG/M2

## 2020-12-16 PROBLEM — M47.817 LUMBOSACRAL SPONDYLOSIS WITHOUT MYELOPATHY: Status: ACTIVE | Noted: 2019-05-14

## 2020-12-16 PROCEDURE — G8400 PT W/DXA NO RESULTS DOC: HCPCS | Performed by: FAMILY MEDICINE

## 2020-12-16 PROCEDURE — G8427 DOCREV CUR MEDS BY ELIG CLIN: HCPCS | Performed by: FAMILY MEDICINE

## 2020-12-16 PROCEDURE — G8417 CALC BMI ABV UP PARAM F/U: HCPCS | Performed by: FAMILY MEDICINE

## 2020-12-16 PROCEDURE — 4040F PNEUMOC VAC/ADMIN/RCVD: CPT | Performed by: FAMILY MEDICINE

## 2020-12-16 PROCEDURE — 1036F TOBACCO NON-USER: CPT | Performed by: FAMILY MEDICINE

## 2020-12-16 PROCEDURE — 1090F PRES/ABSN URINE INCON ASSESS: CPT | Performed by: FAMILY MEDICINE

## 2020-12-16 PROCEDURE — 1123F ACP DISCUSS/DSCN MKR DOCD: CPT | Performed by: FAMILY MEDICINE

## 2020-12-16 PROCEDURE — 3017F COLORECTAL CA SCREEN DOC REV: CPT | Performed by: FAMILY MEDICINE

## 2020-12-16 PROCEDURE — 99214 OFFICE O/P EST MOD 30 MIN: CPT | Performed by: FAMILY MEDICINE

## 2020-12-16 RX ORDER — SIMVASTATIN 20 MG
TABLET ORAL
Qty: 30 TABLET | Refills: 11 | Status: SHIPPED | OUTPATIENT
Start: 2020-12-16

## 2020-12-16 RX ORDER — PANTOPRAZOLE SODIUM 40 MG/1
40 TABLET, DELAYED RELEASE ORAL DAILY
Qty: 30 TABLET | Refills: 11 | Status: SHIPPED | OUTPATIENT
Start: 2020-12-16 | End: 2022-01-17

## 2020-12-16 ASSESSMENT — PATIENT HEALTH QUESTIONNAIRE - PHQ9
SUM OF ALL RESPONSES TO PHQ9 QUESTIONS 1 & 2: 0
SUM OF ALL RESPONSES TO PHQ QUESTIONS 1-9: 0
1. LITTLE INTEREST OR PLEASURE IN DOING THINGS: 0
SUM OF ALL RESPONSES TO PHQ QUESTIONS 1-9: 0
2. FEELING DOWN, DEPRESSED OR HOPELESS: 0
SUM OF ALL RESPONSES TO PHQ QUESTIONS 1-9: 0

## 2020-12-16 NOTE — PROGRESS NOTES
Subjective:      Patient ID: Casa Gaming is a 78 y.o. female. HPI  1. There was some burning to the lateral legs when she was on her feet shopping and there was some swelling to the feet  2. The swelling and burning are better now  3. She wonders about prevagen  Review of Systems   Constitutional: Negative for fatigue. HENT: Negative for sinus pressure. Eyes: Negative for visual disturbance. Respiratory: Negative for shortness of breath. Cardiovascular: Negative for chest pain. Gastrointestinal: Negative for constipation. Genitourinary: Negative. Musculoskeletal: Negative for arthralgias. Skin: Negative for rash. Neurological: Negative for headaches. The patient's medications, allergies, past medical problems, surgical, social, and family histories were reviewed and updated as needed. Objective:   Physical Exam  Constitutional:       General: She is not in acute distress. Appearance: She is well-developed. HENT:      Head: Normocephalic and atraumatic. Right Ear: External ear normal.      Left Ear: External ear normal.      Nose: Nose normal.      Mouth/Throat:      Pharynx: No oropharyngeal exudate. Eyes:      General: No scleral icterus. Conjunctiva/sclera: Conjunctivae normal.   Neck:      Musculoskeletal: Neck supple. Thyroid: No thyromegaly. Vascular: No carotid bruit. Trachea: No tracheal deviation. Cardiovascular:      Rate and Rhythm: Normal rate and regular rhythm. Heart sounds: Normal heart sounds. Pulmonary:      Effort: Pulmonary effort is normal.      Breath sounds: Normal breath sounds. Abdominal:      General: Bowel sounds are normal.      Palpations: Abdomen is soft. There is no mass. Lymphadenopathy:      Cervical: No cervical adenopathy. Skin:     General: Skin is warm and dry. Neurological:      Mental Status: She is alert and oriented to person, place, and time.    Psychiatric:         Behavior: Behavior normal. Blood pressure 112/60, pulse 68, temperature 97 °F (36.1 °C), temperature source Infrared, resp. rate 12, height 5' 2\" (1.575 m), weight 188 lb 8 oz (85.5 kg), not currently breastfeeding. Assessment:       Diagnosis Orders   1. Essential hypertension  simvastatin (ZOCOR) 20 MG tablet   2. Pure hypercholesterolemia  simvastatin (ZOCOR) 20 MG tablet   3.  Gastroesophageal reflux disease, unspecified whether esophagitis present  pantoprazole (PROTONIX) 40 MG tablet           Plan:      See me in 6 months        Storm Councilman, MD

## 2020-12-18 ENCOUNTER — ANESTHESIA (OUTPATIENT)
Dept: OPERATING ROOM | Age: 70
End: 2020-12-18
Payer: MEDICARE

## 2020-12-18 ENCOUNTER — APPOINTMENT (OUTPATIENT)
Dept: GENERAL RADIOLOGY | Age: 70
End: 2020-12-18
Attending: PAIN MEDICINE
Payer: MEDICARE

## 2020-12-18 ENCOUNTER — ANESTHESIA EVENT (OUTPATIENT)
Dept: OPERATING ROOM | Age: 70
End: 2020-12-18
Payer: MEDICARE

## 2020-12-18 ENCOUNTER — HOSPITAL ENCOUNTER (OUTPATIENT)
Age: 70
Setting detail: OUTPATIENT SURGERY
Discharge: HOME | End: 2020-12-18
Attending: PAIN MEDICINE | Admitting: PAIN MEDICINE
Payer: MEDICARE

## 2020-12-18 VITALS
WEIGHT: 188.4 LBS | RESPIRATION RATE: 16 BRPM | DIASTOLIC BLOOD PRESSURE: 57 MMHG | HEART RATE: 82 BPM | SYSTOLIC BLOOD PRESSURE: 112 MMHG | TEMPERATURE: 97.1 F | OXYGEN SATURATION: 95 % | BODY MASS INDEX: 34.67 KG/M2 | HEIGHT: 62 IN

## 2020-12-18 VITALS
DIASTOLIC BLOOD PRESSURE: 61 MMHG | OXYGEN SATURATION: 98 % | RESPIRATION RATE: 8 BRPM | SYSTOLIC BLOOD PRESSURE: 129 MMHG

## 2020-12-18 PROBLEM — M47.816 LUMBAR SPONDYLOSIS: Status: ACTIVE | Noted: 2020-11-03

## 2020-12-18 PROCEDURE — 3209999900 FLUORO FOR SURGICAL PROCEDURES

## 2020-12-18 PROCEDURE — 7100000011 HC PHASE II RECOVERY - ADDTL 15 MIN: Performed by: PAIN MEDICINE

## 2020-12-18 PROCEDURE — 7100000010 HC PHASE II RECOVERY - FIRST 15 MIN: Performed by: PAIN MEDICINE

## 2020-12-18 PROCEDURE — 6360000002 HC RX W HCPCS: Performed by: NURSE ANESTHETIST, CERTIFIED REGISTERED

## 2020-12-18 PROCEDURE — 64636 DESTROY L/S FACET JNT ADDL: CPT | Performed by: PAIN MEDICINE

## 2020-12-18 PROCEDURE — 3700000001 HC ADD 15 MINUTES (ANESTHESIA): Performed by: PAIN MEDICINE

## 2020-12-18 PROCEDURE — 64635 DESTROY LUMB/SAC FACET JNT: CPT | Performed by: PAIN MEDICINE

## 2020-12-18 PROCEDURE — 2709999900 HC NON-CHARGEABLE SUPPLY: Performed by: PAIN MEDICINE

## 2020-12-18 PROCEDURE — 6360000002 HC RX W HCPCS: Performed by: PAIN MEDICINE

## 2020-12-18 PROCEDURE — 3600000057 HC PAIN LEVEL 4 ADDL 15 MIN: Performed by: PAIN MEDICINE

## 2020-12-18 PROCEDURE — 2500000003 HC RX 250 WO HCPCS: Performed by: PAIN MEDICINE

## 2020-12-18 PROCEDURE — 3700000000 HC ANESTHESIA ATTENDED CARE: Performed by: PAIN MEDICINE

## 2020-12-18 PROCEDURE — 3600000056 HC PAIN LEVEL 4 BASE: Performed by: PAIN MEDICINE

## 2020-12-18 RX ORDER — BUPIVACAINE HYDROCHLORIDE 2.5 MG/ML
INJECTION, SOLUTION EPIDURAL; INFILTRATION; INTRACAUDAL PRN
Status: DISCONTINUED | OUTPATIENT
Start: 2020-12-18 | End: 2020-12-18 | Stop reason: ALTCHOICE

## 2020-12-18 RX ORDER — METHYLPREDNISOLONE ACETATE 80 MG/ML
INJECTION, SUSPENSION INTRA-ARTICULAR; INTRALESIONAL; INTRAMUSCULAR; SOFT TISSUE PRN
Status: DISCONTINUED | OUTPATIENT
Start: 2020-12-18 | End: 2020-12-18 | Stop reason: ALTCHOICE

## 2020-12-18 RX ORDER — FENTANYL CITRATE 50 UG/ML
INJECTION, SOLUTION INTRAMUSCULAR; INTRAVENOUS PRN
Status: DISCONTINUED | OUTPATIENT
Start: 2020-12-18 | End: 2020-12-18 | Stop reason: SDUPTHER

## 2020-12-18 RX ORDER — LIDOCAINE HYDROCHLORIDE 10 MG/ML
INJECTION, SOLUTION EPIDURAL; INFILTRATION; INTRACAUDAL; PERINEURAL PRN
Status: DISCONTINUED | OUTPATIENT
Start: 2020-12-18 | End: 2020-12-18 | Stop reason: ALTCHOICE

## 2020-12-18 RX ADMIN — FENTANYL CITRATE 50 MCG: 50 INJECTION, SOLUTION INTRAMUSCULAR; INTRAVENOUS at 11:24

## 2020-12-18 RX ADMIN — FENTANYL CITRATE 50 MCG: 50 INJECTION, SOLUTION INTRAMUSCULAR; INTRAVENOUS at 11:27

## 2020-12-18 RX ADMIN — PROPOFOL 50 MG: 10 INJECTION, EMULSION INTRAVENOUS at 11:30

## 2020-12-18 RX ADMIN — PROPOFOL 50 MG: 10 INJECTION, EMULSION INTRAVENOUS at 11:32

## 2020-12-18 ASSESSMENT — PAIN - FUNCTIONAL ASSESSMENT: PAIN_FUNCTIONAL_ASSESSMENT: 0-10

## 2020-12-18 ASSESSMENT — PAIN DESCRIPTION - DESCRIPTORS: DESCRIPTORS: ACHING

## 2020-12-18 ASSESSMENT — PULMONARY FUNCTION TESTS
PIF_VALUE: 0

## 2020-12-18 NOTE — ANESTHESIA POSTPROCEDURE EVALUATION
Department of Anesthesiology  Postprocedure Note    Patient: Reynaldo Quarles  MRN: [id number]  YOB: 1950  Date of evaluation: 12/18/2020  Time:  3:11 PM     Procedure Summary     Date: 12/18/20 Room / Location: [address] [place] [address] 03 / [address] [facility]    Anesthesia Start: 1119 Anesthesia Stop: 0323    Procedure: Lumbar RFA L2-3, L3-4 and L4-5 left after 12/15/2020 (Left ) Diagnosis: (LUMBAR SPONDYLOSIS)    Surgeons: Andrew Aleman MD Responsible Provider: Tiffany Rivera MD    Anesthesia Type: MAC ASA Status: 3          Anesthesia Type: MAC    Eloy Phase I:      Eloy Phase II: Eloy Score: 10    Last vitals: Reviewed and per EMR flowsheets.        Anesthesia Post Evaluation    Patient location during evaluation: PACU  Patient participation: complete - patient participated  Level of consciousness: awake  Airway patency: patent  Nausea & Vomiting: no vomiting and no nausea  Complications: no  Cardiovascular status: hemodynamically stable  Respiratory status: acceptable  Hydration status: stable

## 2020-12-18 NOTE — OP NOTE
Operative Note    Pre-Procedure Note    Patient Name: Amanda Arce   Date of [address] Record Number: [id number]  Date: 12/18/20    Indication:  Lower back pain  Consent: On file. Vital Signs:   Vitals:    12/18/20 1009   BP: (!) 144/67   Pulse: 64   Resp: 16   Temp: 97.3 °F (36.3 °C)   SpO2: 97%       Past Medical History:   has a past medical history of Adenomatous colon polyp, Chronic gastritis, Coronary artery spasm (HCC), GERD (gastroesophageal reflux disease), Hiatal hernia, Hyperlipidemia, Hypertension, MVP (mitral valve prolapse), Obesity, Class II, BMI 35-39.9, with comorbidity, Osteopenia, Scoliosis (and kyphoscoliosis), idiopathic, and Vitamin D deficiency. Past Surgical History:   has a past surgical history that includes Carpal tunnel release (2007); Hand surgery (2008); Upper gastrointestinal endoscopy (10/2013); Cardiac catheterization (2008); Cardiac catheterization (12/18/2018); Endoscopy, colon, diagnostic (01/27/2019); Nerve Block Lumb Facet Level 1 Bilateral (Bilateral, 2/4/2019); Nerve Block Lumb Facet Level 1 Bilateral (Bilateral, 3/25/2019); Lumbar spine surgery (Right, 5/20/2019); Lumbar spine surgery (Left, 6/17/2019); epidural steroid injection (Bilateral, 8/1/2019); epidural steroid injection (Bilateral, 9/19/2019); Lumbar spine surgery (Right, 12/2/2019); Pain management procedure (Left, 6/15/2020); Pain management procedure (Right, 7/31/2020); and Pain management procedure (N/A, 10/29/2020). Pre-Sedation Documentation and Exam:   Vital signs have been reviewed (see flow sheet for vitals).      Sedation/ Anesthesia Plan:   MAC    Patient is an appropriate candidate for plan of sedation: yes       Preoperative Diagnosis:  L-spondylosis     Post-Op Dx: as above     Procedure Performed:  :Radiofrequency ablation of median branches at the levels of L2-3,L3-4 and L4-5 left  under fluoroscopic guidance     Indication for the Procedure:  The patient has ahistory of chronic low back pain that is not responding well to the conservative treatment.  Patient's pain is mostly axial in nature.  Pain is interfering with the activities of daily living.  Physical examination revealed facet tenderness and facet loading is positive.  Patient had undergone lumbar facet joint injections with pain relief that lasted for only a short period of time and had greater than 70% pain relief with confirmatory median branch blocks.  Hence we decided to do radiofrequency abalation of median branches for long term pain releif.   The procedure and risks  were discussed with the patient and an informed consent was obtained.     Procedure:  Left side   A meaningful communication was kept up with the patient throughout the procedure. The patient is placed in prone position and skin over the back was prepped and draped in sterile manner.  Then using fluoroscopy the junction of the transverse process of the vertebra with the superior process of the facet joint was observed and the view was optimized.  The skin and deep tissues posterior were infiltrated with 10 ml of  1% xylocaine. The RF canula with the 10 mm active tip was introduced through the skin wheal under fluoroscopy guidance such that the tip of the needle lies in the groove of the transverse process with the superior processes of the facet joint.  Then a lateral view of the lumbar spine was obtained to make sure the tip of needle is not in the neural foramen.  Then electric impedence was checked to make sure it is acceptable. Then a sensory stimulus was applied at 50 Hz up to 1 volt and concordant pain symptoms were reproduced. Then a motor stimulus was applied at 2 Hz up to 2 volts and no motor stimulation was seen in lower extremities.  Some multifidus stimulus was seen. Andres Glassing after negative aspiration a mixture of depomedrol 80 mg  and 0.25%  marcaine 1.5 cc  was injected through the needle in divided doses.  Then a  lesion was done at 80 degrees centigrade for 90 seconds.     EBL-0     Patient's vital signs and neurological status remained stable throughout the procedure and post procedural period.  The patient tolerated the procedure well and was discharged home in stable condition.         Electronically signed by Rhianna Shell MD on 12/18/20 at 11:23 AM EST

## 2020-12-18 NOTE — H&P
[address] [address]  History and Physical Update    Pt Name: Adis Lara  MRN: [id number]  YOB: 1950  Date of evaluation: 12/18/2020      I have examined the patient and reviewed the H&P/Consult and there are no changes to the patient or plans.         Electronically signed by Leila Ayala MD on 12/18/2020 at 10:29 AM

## 2020-12-18 NOTE — ANESTHESIA PRE PROCEDURE
 Disease of larynx J38.7    Gastroesophageal reflux disease K21.9    Dysphagia R13.10    Deviated nasal septum J34.2    Epistaxis R04.0    Nasal mucositis (ulcerative) J34.81    Multinodular goiter E04.2    Pure hypercholesterolemia E78.00    Essential hypertension I10    Spondylosis of lumbar region without myelopathy or radiculopathy M47.816    Bulging lumbar disc M51.26    Lumbosacral spondylosis without myelopathy M47.817    Anxiety F41.9       Past Medical History:        Diagnosis Date    Adenomatous colon polyp 2012    Chronic gastritis 2013    Coronary artery spasm (HCC)     GERD (gastroesophageal reflux disease) 2002    Hiatal hernia 01/27/2019    Hyperlipidemia     Hypertension 2001    MVP (mitral valve prolapse)     Obesity, Class II, BMI 35-39.9, with comorbidity 2012    Osteopenia 10/2012    Scoliosis (and kyphoscoliosis), idiopathic     Vitamin D deficiency 2012       Past Surgical History:        Procedure Laterality Date    CARDIAC CATHETERIZATION  2008    CARDIAC CATHETERIZATION  12/18/2018    CARPAL TUNNEL RELEASE  2007    right    ENDOSCOPY, COLON, DIAGNOSTIC  01/27/2019    esophagus stretching    EPIDURAL STEROID INJECTION Bilateral 8/1/2019    LESI L4 performed by Gabriella Singh MD at [facility] 2 Bilateral 9/19/2019    LESI L4 or other level #2 performed by Gabriella Singh MD at [address] [place] [address] SURGERY  2008    left trigger finger    LUMBAR SPINE SURGERY Right 5/20/2019    LUMBAR RFA, L2-3, L3-4, L4-5, RIGHT SIDE performed by Gabriella Singh MD at [address] [facility] Left 6/17/2019    LUMBAR RFA L2-3, L3-4, L4-5, LEFT SIDE performed by Gabriella Singh MD at [address] [facility] Right 12/2/2019    Lumbar RFA L2-3,L3-4 and L4-5 right performed by Gabriella Singh MD at [address] [place] [address]  NERVE BLOCK LUMB FACET LEVEL 1 BILATERAL Bilateral 2/4/2019    LUMBAR FACET MBB @ L2-3,  L3-4,  L4-5   BILATERAL performed by Doroteo Hernandez MD at [facility] 41 FACET LEVEL 1 BILATERAL Bilateral 3/25/2019    LUMBAR FACET MBB @ L2-3,  L3-4,  L4-5   BILATERAL performed by Doroteo Hernandez MD at [place] [address] Left 6/15/2020    Lumbar RFA  L2-3,L3-4 and L4-5 left, performed by Doroteo Hernandez MD at [place] [address] Right 7/31/2020    Lumbar RFA L2-3,L3-4 and L4-5 right performed by Doroteo Hernandez MD at [place] [address] N/A 10/29/2020    LESI L4 performed by Doroteo Hernandez MD at [address] [place] [address]  10/2013       Social History:    Social History     Tobacco Use    Smoking status: Never Smoker    Smokeless tobacco: Never Used   Substance Use Topics    Alcohol use: No                                Counseling given: Not Answered      Vital Signs (Current): There were no vitals filed for this visit.                                            BP Readings from Last 3 Encounters:   12/16/20 112/60   11/30/20 124/66   10/29/20 97/60       NPO Status: Time of last liquid consumption: 0700(sip with med)                        Time of last solid consumption: 1900                        Date of last liquid consumption: 12/18/20                        Date of last solid food consumption: 12/17/20    BMI:   Wt Readings from Last 3 Encounters:   12/16/20 188 lb 8 oz (85.5 kg)   11/30/20 186 lb (84.4 kg)   10/29/20 186 lb 12.8 oz (84.7 kg)     There is no height or weight on file to calculate BMI.    CBC:   Lab Results   Component Value Date    WBC 6.9 08/30/2019    RBC 4.19 08/30/2019    HGB 12.5 08/30/2019    HCT 37.6 08/30/2019    MCV 89.8 08/30/2019    RDW 14.1 08/30/2019     08/30/2019       CMP: Lab Results   Component Value Date     08/30/2019    K 3.8 08/30/2019     08/30/2019    CO2 26 08/30/2019    BUN 16 08/30/2019    CREATININE 0.67 08/30/2019    AGRATIO 1.7 08/30/2019    LABGLOM 72 04/10/2018    GLUCOSE 112 08/30/2019    PROT 6.5 08/30/2019    CALCIUM 8.90 08/30/2019    BILITOT 0.5 08/30/2019    ALKPHOS 87 08/30/2019    AST 22 08/30/2019    ALT 19 08/30/2019       POC Tests: No results for input(s): POCGLU, POCNA, POCK, POCCL, POCBUN, POCHEMO, POCHCT in the last 72 hours. Coags:   Lab Results   Component Value Date    PROTIME 12.0 12/14/2018    INR 1.04 12/14/2018       HCG (If Applicable): No results found for: PREGTESTUR, PREGSERUM, HCG, HCGQUANT     ABGs: No results found for: PHART, PO2ART, YWK2DRD, TPK3FPA, BEART, H9AXJOMZ     Type & Screen (If Applicable):  No results found for: LABABO, LABRH    Drug/Infectious Status (If Applicable):  No results found for: HIV, HEPCAB    COVID-19 Screening (If Applicable):   Lab Results   Component Value Date    COVID19 Not Detected 07/27/2020         Anesthesia Evaluation    Airway: Mallampati: II  TM distance: >3 FB   Neck ROM: full  Mouth opening: > = 3 FB Dental:          Pulmonary:   (+) decreased breath sounds,                             Cardiovascular:    (+) hypertension:, CAD:,         Rhythm: regular                      Neuro/Psych:   (+) psychiatric history:            GI/Hepatic/Renal:   (+) hiatal hernia, GERD:,           Endo/Other:                     Abdominal:           Vascular:                                        Anesthesia Plan      MAC     ASA 3       Induction: intravenous. Anesthetic plan and risks discussed with patient. Plan discussed with CRNA.                   Molly Stokes MD   12/18/2020

## 2020-12-18 NOTE — H&P
H&P    LESI (10/29/2020) and Lumbar RFA (Right 7/31/2020, Left 6/15/2020) with 60% relief. States that the left side is starting to act up and would like to have this repeated. Patient reports good days and bad days. Patient states taking gabapentin BID and states it seems to be helping. Patient reports no side effects with BID. Discussed TID.      Medications reviewed.        The patientis allergic to codeine and pcn [penicillins].     Past Medical History  Anahi Spear  has a past medical history of Adenomatous colon polyp, Chronic gastritis, Coronary artery spasm ([address] GERD (gastroesophageal reflux disease), Hiatal hernia, Hyperlipidemia, Hypertension, MVP (mitral valve prolapse), Obesity, Class II, BMI 35-39.9, with comorbidity, Osteopenia, Scoliosis (and kyphoscoliosis), idiopathic, and Vitamin D deficiency.     Past Surgical History  The patient  has a past surgical history that includes Carpal tunnel release (2007); Hand surgery (2008); Upper gastrointestinal endoscopy (10/2013); Cardiac catheterization (2008); Cardiac catheterization (12/18/2018); Endoscopy, colon, diagnostic (01/27/2019); Nerve Block Lumb Facet Level 1 Bilateral (Bilateral, 2/4/2019); Nerve Block Lumb Facet Level 1 Bilateral (Bilateral, 3/25/2019); Lumbar spine surgery (Right, 5/20/2019); Lumbar spine surgery (Left, 6/17/2019); epidural steroid injection (Bilateral, 8/1/2019); epidural steroid injection (Bilateral, 9/19/2019); Lumbar spine surgery (Right, 12/2/2019); Pain management procedure (Left, 6/15/2020); Pain management procedure (Right, 7/31/2020); and Pain management procedure (N/A, 10/29/2020).      Family History  This patient's family history includes Alzheimer's Disease in her mother; Heart Attack in her brother and father; Heart Disease in her brother and father; High Blood Pressure in her father and mother.     Social History Yazmin Olvera  reports that she has never smoked. She has never used smokeless tobacco. She reports that she does not drink alcohol or use drugs.     Medications    Current Medication      Current Outpatient Medications:     gabapentin (NEURONTIN) 300 MG capsule, Take 1 capsule by mouth 3 times daily for 30 days. , Disp: 90 capsule, Rfl: 0    lisinopril (PRINIVIL;ZESTRIL) 20 MG tablet, TAKE 1 TABLET BY MOUTH DAILY SHE NEEDS SEEN FOR MORE, Disp: 90 tablet, Rfl: 3    simvastatin (ZOCOR) 20 MG tablet, TAKE 1 TABLET BY MOUTH EVERY DAY, Disp: 90 tablet, Rfl: 3    pantoprazole (PROTONIX) 40 MG tablet, , Disp: , Rfl:     ibuprofen (ADVIL;MOTRIN) 200 MG tablet, Take 200 mg by mouth every 6 hours as needed for Pain, Disp: , Rfl:     Cholecalciferol (VITAMIN D3) 5000 UNITS CAPS, Take 1 capsule by mouth daily. , Disp: , Rfl:     aspirin 81 MG tablet, Take 81 mg by mouth daily. , Disp: , Rfl:     calcium carbonate 600 MG TABS tablet, Take 1 tablet by mouth 2 times daily. , Disp: , Rfl:     amitriptyline (ELAVIL) 10 MG tablet, Take 1 tablet by mouth nightly for 14 days, Disp: 14 tablet, Rfl: 0    amLODIPine (NORVASC) 2.5 MG tablet, Take 1 tablet by mouth daily, Disp: 90 tablet, Rfl: 3        Subjective:      Review of Systems   Constitutional: Positive for activity change. Negative for chills, diaphoresis, fatigue and fever. Genitourinary: Negative for difficulty urinating, frequency and urgency. Musculoskeletal: Positive for arthralgias, back pain and myalgias. Negative for gait problem, neck pain and neck stiffness. Skin: Negative for color change, rash and wound. Allergic/Immunologic: Negative for environmental allergies and food allergies. Neurological: Negative for dizziness, seizures, light-headedness, numbness and headaches. Hematological: Does not bruise/bleed easily. Psychiatric/Behavioral: Negative for sleep disturbance.  The patient is not nervous/anxious.          Objective:      Vitals       Vitals:   11/30/20 0858   BP: 124/66   Temp: 97.2 °F (36.2 °C)   Weight: 186 lb (84.4 kg)   Height: 5' 2\" (1.575 m)            Physical Exam  Vitals signs reviewed. Constitutional:       General: She is not in acute distress. Appearance: She is well-developed. She is not diaphoretic. HENT:      Head: Normocephalic and atraumatic. Not macrocephalic and not microcephalic. Right Ear: External ear normal.      Left Ear: External ear normal.   Eyes:      General:         Right eye: No discharge. Left eye: No discharge. Conjunctiva/sclera: Conjunctivae normal.   Neck:      Trachea: No tracheal deviation. Pulmonary:      Effort: Pulmonary effort is normal. No respiratory distress. Musculoskeletal:         General: Tenderness present. Lumbar back: She exhibits decreased range of motion, tenderness, pain and spasm. Back:    Skin:     General: Skin is warm and dry. Coloration: Skin is not pale. Findings: No rash. Neurological:      Mental Status: She is alert and oriented to person, place, and time. Cranial Nerves: No cranial nerve deficit. Psychiatric:         Attention and Perception: She is attentive. Speech: Speech normal.         Behavior: Behavior normal.         Thought Content: Thought content normal.         Judgment: Judgment normal.            Assessment:      1. Lumbar stenosis with neurogenic claudication    2. Bulging lumbar disc    3. 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 Lumbar pain    5. Chronic pain syndrome    6. SI (sacroiliac) joint inflammation ([facility] [address]       Plan:      · OARRS reviewed. Current MED: 0  · Patient was not offered naloxone for home. · Discussed long term side effects of medications, tolerance, dependency and addiction. · Previous UDS reviewed  · Patient told can not receive any pain medications from any other source. · No evidence of abuse, diversion or aberrant behavior. · Medications and/or procedures to improve function and quality of life- patient understanding with this and that may not be pain free  · Discussed with patient about safe storage of medications at home  · Discussed possible weaning of medication dosing dependent on treatment/procedure results. · Testing: none   · Procedures: Lumbar RFA L2-3,L3-4 and L4-5 left after 12/15/2020  · Discussed with patient about risks with procedure including infection, reaction to medication, increased pain, or bleeding. · Medications: Increase gabapentin to TID.  Ok to use with Lidoderm patch.            Return for Lumbar RFA L2-3, L3-4 and L4-5 left after 12/15/2020, follow up after procedure with Brooklynn .

## 2021-01-12 ENCOUNTER — TELEPHONE (OUTPATIENT)
Dept: FAMILY MEDICINE CLINIC | Age: 71
End: 2021-01-12

## 2021-01-12 DIAGNOSIS — J02.8 ACUTE PHARYNGITIS DUE TO OTHER SPECIFIED ORGANISMS: Primary | ICD-10-CM

## 2021-01-12 RX ORDER — AZITHROMYCIN 250 MG/1
TABLET, FILM COATED ORAL
Qty: 1 PACKET | Refills: 0 | Status: SHIPPED | OUTPATIENT
Start: 2021-01-12 | End: 2021-01-22

## 2021-01-12 NOTE — TELEPHONE ENCOUNTER
Pt called req an ATB for   Strep throat. Pt is having sore throat, pus pockets in back of throat, sore to swallow.   Pt is currently in [facility]    Phone

## 2021-01-27 ENCOUNTER — OFFICE VISIT (OUTPATIENT)
Dept: PHYSICAL MEDICINE AND REHAB | Age: 71
End: 2021-01-27
Payer: MEDICARE

## 2021-01-27 ENCOUNTER — TELEPHONE (OUTPATIENT)
Dept: PHYSICAL MEDICINE AND REHAB | Age: 71
End: 2021-01-27

## 2021-01-27 VITALS
HEIGHT: 62 IN | TEMPERATURE: 97.8 F | SYSTOLIC BLOOD PRESSURE: 112 MMHG | WEIGHT: 188 LBS | BODY MASS INDEX: 34.6 KG/M2 | DIASTOLIC BLOOD PRESSURE: 62 MMHG | HEART RATE: 70 BPM

## 2021-01-27 DIAGNOSIS — G89.4 CHRONIC PAIN SYNDROME: ICD-10-CM

## 2021-01-27 DIAGNOSIS — M48.062 LUMBAR STENOSIS WITH NEUROGENIC CLAUDICATION: ICD-10-CM

## 2021-01-27 DIAGNOSIS — M46.1 SI (SACROILIAC) JOINT INFLAMMATION (HCC): ICD-10-CM

## 2021-01-27 DIAGNOSIS — M51.36 BULGING LUMBAR DISC: ICD-10-CM

## 2021-01-27 DIAGNOSIS — M47.816 SPONDYLOSIS OF LUMBAR REGION WITHOUT MYELOPATHY OR RADICULOPATHY: Primary | ICD-10-CM

## 2021-01-27 PROCEDURE — 3017F COLORECTAL CA SCREEN DOC REV: CPT | Performed by: NURSE PRACTITIONER

## 2021-01-27 PROCEDURE — 4040F PNEUMOC VAC/ADMIN/RCVD: CPT | Performed by: NURSE PRACTITIONER

## 2021-01-27 PROCEDURE — G8400 PT W/DXA NO RESULTS DOC: HCPCS | Performed by: NURSE PRACTITIONER

## 2021-01-27 PROCEDURE — 1123F ACP DISCUSS/DSCN MKR DOCD: CPT | Performed by: NURSE PRACTITIONER

## 2021-01-27 PROCEDURE — 1036F TOBACCO NON-USER: CPT | Performed by: NURSE PRACTITIONER

## 2021-01-27 PROCEDURE — G8417 CALC BMI ABV UP PARAM F/U: HCPCS | Performed by: NURSE PRACTITIONER

## 2021-01-27 PROCEDURE — 1090F PRES/ABSN URINE INCON ASSESS: CPT | Performed by: NURSE PRACTITIONER

## 2021-01-27 PROCEDURE — 99213 OFFICE O/P EST LOW 20 MIN: CPT | Performed by: NURSE PRACTITIONER

## 2021-01-27 PROCEDURE — G8482 FLU IMMUNIZE ORDER/ADMIN: HCPCS | Performed by: NURSE PRACTITIONER

## 2021-01-27 PROCEDURE — G8427 DOCREV CUR MEDS BY ELIG CLIN: HCPCS | Performed by: NURSE PRACTITIONER

## 2021-01-27 ASSESSMENT — ENCOUNTER SYMPTOMS
COLOR CHANGE: 0
BACK PAIN: 1

## 2021-01-27 NOTE — PROGRESS NOTES
[address] [facility] [address]  Dept: [phone]  Dept Fax: [phone]: [phone]    Visit Date: 1/27/2021    Functionality Assessment/Goals Worksheet     On a scale of 0 (Does not Interfere) to 10 (Completely Interferes)     1. Which number describes how during the past week pain has interfered with       the following:  A. General Activity:  7  B. Mood: 6  C. Walking Ability:  7  D. Normal Work (Includes both work outside the home and housework):  8  E. Relations with Other People:   3  F. Sleep:   4  G. Enjoyment of Life:   7    2. Patient Prefers to Take their Pain Medications:     []  On a regular basis   [x]  Only when necessary    []  Does not take pain medications    3. What are the Patient's Goals/Expectations for Visiting Pain Management? []  Learn about my pain    []  Receive Medication   []  Physical Therapy     []  Treat Depression   [x]  Receive Injections    []  Treat Sleep   []  Deal with Anxiety and Stress   []  Treat Opoid Dependence/Addiction   []  Other:      HPI:   Shashi Danielle is a 78 y.o. female is here today for    Chief Complaint: Low back pain    HPI   F/U  Repeat Left Lumbar RFA L2-% 12/18/2020 with 50% pain relief or benefit. She continues to have low back pain does not radiate anywhere else. She has increased pain with walking bending lifting twisting turning. She stopped taking Neurontin due to heart palpitations. She has had Bilateral Lumbar RFA  July Left June 2020 with 50% pain relief for 6 months, She had had LESI  10/29/2020 With 60% pain releif. Medications reviewed. Patient complains of side effects with medicationss. She denies any ER visits since last visit. Pain scale with out pain medications or at its worst is 8/10. Pain scale with pain medications or at its best is 4/10.         The patientis allergic to codeine and pcn [penicillins]. Past Medical History  Aldo Munguia  has a past medical history of Adenomatous colon polyp, Chronic gastritis, Coronary artery spasm ([address] GERD (gastroesophageal reflux disease), Hiatal hernia, Hyperlipidemia, Hypertension, MVP (mitral valve prolapse), Obesity, Class II, BMI 35-39.9, with comorbidity, Osteopenia, Scoliosis (and kyphoscoliosis), idiopathic, and Vitamin D deficiency. Past Surgical History  The patient  has a past surgical history that includes Carpal tunnel release (2007); Hand surgery (2008); Upper gastrointestinal endoscopy (10/2013); Cardiac catheterization (2008); Cardiac catheterization (12/18/2018); Endoscopy, colon, diagnostic (01/27/2019); Nerve Block Lumb Facet Level 1 Bilateral (Bilateral, 2/4/2019); Nerve Block Lumb Facet Level 1 Bilateral (Bilateral, 3/25/2019); Lumbar spine surgery (Right, 5/20/2019); Lumbar spine surgery (Left, 6/17/2019); epidural steroid injection (Bilateral, 8/1/2019); epidural steroid injection (Bilateral, 9/19/2019); Lumbar spine surgery (Right, 12/2/2019); Pain management procedure (Left, 6/15/2020); Pain management procedure (Right, 7/31/2020); Pain management procedure (N/A, 10/29/2020); and Pain management procedure (Left, 12/18/2020). Family History  This patient's family history includes Alzheimer's Disease in her mother; Heart Attack in her brother and father; Heart Disease in her brother and father; High Blood Pressure in her father and mother. Social History  Aldo Munguia  reports that she has never smoked. She has never used smokeless tobacco. She reports that she does not drink alcohol or use drugs.     Medications    Current Outpatient Medications:     pantoprazole (PROTONIX) 40 MG tablet, Take 1 tablet by mouth daily, Disp: 30 tablet, Rfl: 11    simvastatin (ZOCOR) 20 MG tablet, TAKE 1 TABLET BY MOUTH EVERY DAY, Disp: 30 tablet, Rfl: 11    lisinopril (PRINIVIL;ZESTRIL) 20 MG tablet, TAKE 1 TABLET BY MOUTH DAILY SHE NEEDS SEEN FOR MORE, Disp: 90 tablet, Rfl: 3    amitriptyline (ELAVIL) 10 MG tablet, Take 1 tablet by mouth nightly for 14 days, Disp: 14 tablet, Rfl: 0    ibuprofen (ADVIL;MOTRIN) 200 MG tablet, Take 200 mg by mouth every 6 hours as needed for Pain, Disp: , Rfl:     amLODIPine (NORVASC) 2.5 MG tablet, Take 1 tablet by mouth daily, Disp: 90 tablet, Rfl: 3    Cholecalciferol (VITAMIN D3) 5000 UNITS CAPS, Take 1 capsule by mouth daily. , Disp: , Rfl:     aspirin 81 MG tablet, Take 81 mg by mouth daily. , Disp: , Rfl:     calcium carbonate 600 MG TABS tablet, Take 1 tablet by mouth 2 times daily. , Disp: , Rfl:     Subjective:      Review of Systems   Constitutional: Positive for activity change. Negative for chills, diaphoresis, fatigue and fever. Cardiovascular:        CAD  MVP   Genitourinary: Negative for difficulty urinating, frequency and urgency. Musculoskeletal: Positive for arthralgias, back pain, gait problem and myalgias. Negative for neck pain and neck stiffness. Skin: Negative for color change, rash and wound. Allergic/Immunologic: Negative for environmental allergies and food allergies. Neurological: Negative for dizziness, seizures, light-headedness, numbness and headaches. Hematological: Does not bruise/bleed easily. Psychiatric/Behavioral: Negative for sleep disturbance. The patient is not nervous/anxious. Objective:     Vitals:    01/27/21 0913   BP: 112/62   Site: Right Upper Arm   Position: Sitting   Cuff Size: Medium Adult   Pulse: 70   Temp: 97.8 °F (36.6 °C)   TempSrc: Temporal   Weight: 188 lb (85.3 kg)   Height: 5' 2\" (1.575 m)       Physical Exam  Vitals signs reviewed. Constitutional:       General: She is not in acute distress. Appearance: She is well-developed. She is not diaphoretic. HENT:      Head: Normocephalic and atraumatic. Not macrocephalic and not microcephalic.       Right Ear: External ear normal.      Left Ear: External ear normal.   Eyes:      General: Right eye: No discharge. Left eye: No discharge. Conjunctiva/sclera: Conjunctivae normal.   Neck:      Trachea: No tracheal deviation. Pulmonary:      Effort: Pulmonary effort is normal. No respiratory distress. Musculoskeletal:         General: Tenderness present. Lumbar back: She exhibits decreased range of motion, tenderness, bony tenderness, pain and spasm. Back:    Skin:     General: Skin is warm and dry. Coloration: Skin is not pale. Findings: No rash. Neurological:      Mental Status: She is alert and oriented to person, place, and time. Cranial Nerves: No cranial nerve deficit. Psychiatric:         Attention and Perception: She is attentive. Speech: Speech normal.         Behavior: Behavior normal.         Thought Content: Thought content normal.         Judgment: Judgment normal.            Assessment:     1. Spondylosis of lumbar region without myelopathy or radiculopathy    2. Lumbar stenosis with neurogenic claudication    3. SI (sacroiliac) joint inflammation (HCC)    4. Chronic pain syndrome    5. Bulging lumbar disc            Plan:      · OARRS reviewed. Current MED: 0  · Patient was not offered naloxone for home. · Discussed long term side effects of medications, tolerance, dependency and addiction. · Previous UDS reviewed  · UDS preformed today for compliance. · Patient told can not receive any pain medications from any other source. · No evidence of abuse, diversion or aberrant behavior.  Medications and/or procedures to improve function and quality of life- patient understanding with this and that may not be pain free   Discussed with patient about safe storage of medications at home   Discussed possible weaning of medication dosing dependent on treatment/procedure results.     Testing:none   Procedures: Repeat Lumbar RFA right side L2-3,3-4,4-5, pt is on Aspirin    Discussed with patient about risks with procedure,   including infection, reaction to medication, increased pain, or bleeding.  Medications:none   If patient is on  prescribed blood thinners will need Pre Op Clearance per Cardiologist or PCP to hold all blood thinners prior to procedure for the required time frame, this is to be completed by the nursing staff during the scheduling process. Meds. Prescribed:   No orders of the defined types were placed in this encounter. Return for Lumbar RFA Right side  L2-3, 3-4, 4-5, Follow up after procedure.                Electronically signed by SHERRY Contreras CNP on1/27/2021 at 9:34 AM

## 2021-02-05 ENCOUNTER — TELEPHONE (OUTPATIENT)
Dept: PHYSICAL MEDICINE AND REHAB | Age: 71
End: 2021-02-05

## 2021-02-05 NOTE — TELEPHONE ENCOUNTER
Discharge  D: Orders for discharge and outpatient medications written.  I: Home medications and return to clinic schedule reviewed with patient. Discharge instructions and parameters for calling Health Care Provider reviewed. Patient left at 2000 accompanied by , Ron with [facility] transport to [facility].  A: Patient/family verbalized understanding and was ready for discharge.   P: Patient instructed to  medications in Pharmacy. Follow up as scheduled.      Pre-Procedure Travel Screening Questions completed.

## 2021-02-08 ENCOUNTER — HOSPITAL ENCOUNTER (OUTPATIENT)
Age: 71
Setting detail: OUTPATIENT SURGERY
Discharge: HOME | End: 2021-02-08
Attending: PAIN MEDICINE | Admitting: PAIN MEDICINE
Payer: MEDICARE

## 2021-02-08 ENCOUNTER — ANESTHESIA EVENT (OUTPATIENT)
Dept: OPERATING ROOM | Age: 71
End: 2021-02-08
Payer: MEDICARE

## 2021-02-08 ENCOUNTER — ANESTHESIA (OUTPATIENT)
Dept: OPERATING ROOM | Age: 71
End: 2021-02-08
Payer: MEDICARE

## 2021-02-08 ENCOUNTER — APPOINTMENT (OUTPATIENT)
Dept: GENERAL RADIOLOGY | Age: 71
End: 2021-02-08
Attending: PAIN MEDICINE
Payer: MEDICARE

## 2021-02-08 VITALS
OXYGEN SATURATION: 96 % | RESPIRATION RATE: 11 BRPM | SYSTOLIC BLOOD PRESSURE: 107 MMHG | DIASTOLIC BLOOD PRESSURE: 55 MMHG

## 2021-02-08 VITALS
HEIGHT: 62 IN | BODY MASS INDEX: 34.04 KG/M2 | DIASTOLIC BLOOD PRESSURE: 58 MMHG | HEART RATE: 72 BPM | WEIGHT: 185 LBS | OXYGEN SATURATION: 93 % | SYSTOLIC BLOOD PRESSURE: 101 MMHG | TEMPERATURE: 96.9 F | RESPIRATION RATE: 14 BRPM

## 2021-02-08 PROCEDURE — 7100000010 HC PHASE II RECOVERY - FIRST 15 MIN: Performed by: PAIN MEDICINE

## 2021-02-08 PROCEDURE — 3600000057 HC PAIN LEVEL 4 ADDL 15 MIN: Performed by: PAIN MEDICINE

## 2021-02-08 PROCEDURE — 2709999900 HC NON-CHARGEABLE SUPPLY: Performed by: PAIN MEDICINE

## 2021-02-08 PROCEDURE — 6360000002 HC RX W HCPCS: Performed by: PAIN MEDICINE

## 2021-02-08 PROCEDURE — 6360000002 HC RX W HCPCS: Performed by: NURSE ANESTHETIST, CERTIFIED REGISTERED

## 2021-02-08 PROCEDURE — 3700000000 HC ANESTHESIA ATTENDED CARE: Performed by: PAIN MEDICINE

## 2021-02-08 PROCEDURE — 3209999900 FLUORO FOR SURGICAL PROCEDURES

## 2021-02-08 PROCEDURE — 3600000056 HC PAIN LEVEL 4 BASE: Performed by: PAIN MEDICINE

## 2021-02-08 PROCEDURE — 7100000011 HC PHASE II RECOVERY - ADDTL 15 MIN: Performed by: PAIN MEDICINE

## 2021-02-08 PROCEDURE — 2500000003 HC RX 250 WO HCPCS: Performed by: NURSE ANESTHETIST, CERTIFIED REGISTERED

## 2021-02-08 PROCEDURE — 64635 DESTROY LUMB/SAC FACET JNT: CPT | Performed by: PAIN MEDICINE

## 2021-02-08 PROCEDURE — 2500000003 HC RX 250 WO HCPCS: Performed by: PAIN MEDICINE

## 2021-02-08 PROCEDURE — 64636 DESTROY L/S FACET JNT ADDL: CPT | Performed by: PAIN MEDICINE

## 2021-02-08 PROCEDURE — 3700000001 HC ADD 15 MINUTES (ANESTHESIA): Performed by: PAIN MEDICINE

## 2021-02-08 RX ORDER — LIDOCAINE HYDROCHLORIDE 20 MG/ML
INJECTION, SOLUTION INFILTRATION; PERINEURAL PRN
Status: DISCONTINUED | OUTPATIENT
Start: 2021-02-08 | End: 2021-02-08 | Stop reason: SDUPTHER

## 2021-02-08 RX ORDER — PROPOFOL 10 MG/ML
INJECTION, EMULSION INTRAVENOUS PRN
Status: DISCONTINUED | OUTPATIENT
Start: 2021-02-08 | End: 2021-02-08 | Stop reason: SDUPTHER

## 2021-02-08 RX ORDER — FENTANYL CITRATE 50 UG/ML
INJECTION, SOLUTION INTRAMUSCULAR; INTRAVENOUS PRN
Status: DISCONTINUED | OUTPATIENT
Start: 2021-02-08 | End: 2021-02-08 | Stop reason: SDUPTHER

## 2021-02-08 RX ORDER — METHYLPREDNISOLONE ACETATE 80 MG/ML
INJECTION, SUSPENSION INTRA-ARTICULAR; INTRALESIONAL; INTRAMUSCULAR; SOFT TISSUE PRN
Status: DISCONTINUED | OUTPATIENT
Start: 2021-02-08 | End: 2021-02-08 | Stop reason: ALTCHOICE

## 2021-02-08 RX ORDER — LIDOCAINE HYDROCHLORIDE 10 MG/ML
INJECTION, SOLUTION EPIDURAL; INFILTRATION; INTRACAUDAL; PERINEURAL PRN
Status: DISCONTINUED | OUTPATIENT
Start: 2021-02-08 | End: 2021-02-08 | Stop reason: ALTCHOICE

## 2021-02-08 RX ORDER — BUPIVACAINE HYDROCHLORIDE 2.5 MG/ML
INJECTION, SOLUTION EPIDURAL; INFILTRATION; INTRACAUDAL PRN
Status: DISCONTINUED | OUTPATIENT
Start: 2021-02-08 | End: 2021-02-08 | Stop reason: ALTCHOICE

## 2021-02-08 RX ADMIN — PROPOFOL 30 MG: 10 INJECTION, EMULSION INTRAVENOUS at 09:11

## 2021-02-08 RX ADMIN — FENTANYL CITRATE 100 MCG: 50 INJECTION, SOLUTION INTRAMUSCULAR; INTRAVENOUS at 09:00

## 2021-02-08 RX ADMIN — LIDOCAINE HYDROCHLORIDE 50 MG: 20 INJECTION, SOLUTION INFILTRATION; PERINEURAL at 09:10

## 2021-02-08 RX ADMIN — PROPOFOL 50 MG: 10 INJECTION, EMULSION INTRAVENOUS at 09:10

## 2021-02-08 ASSESSMENT — PULMONARY FUNCTION TESTS
PIF_VALUE: 0

## 2021-02-08 ASSESSMENT — PAIN DESCRIPTION - DESCRIPTORS: DESCRIPTORS: ACHING;SHOOTING

## 2021-02-08 ASSESSMENT — PAIN SCALES - GENERAL: PAINLEVEL_OUTOF10: 0

## 2021-02-08 NOTE — PROGRESS NOTES
7121-  Patient arrived to phase II via cart. Spontaneous respirations even and unlabored. Placed on monitor--VSS. Report received from [facility].   6505-  Assessment completed. Patient is alert and oriented x4. IV capped off-- no complications. Patient denies pain--will monitor. Injection sites clean and dry. 5-  Snack and drink given to patient. Family in room. 2245-  All belongings in room. Discharge instructions given in pre-op, no further questions. 9701-  IV removed-- no complications. Bandage applied. 0930-  Patient dressed. 9200-  Patient discharged in stable condition with all belongings. This RN walked patient to car.

## 2021-02-08 NOTE — H&P
H&P     Repeat Left Lumbar RFA L2-3,L3-4 and L4-5 12/18/2020 with 50% pain relief or benefit. She continues to have low back pain does not radiate anywhere else. She has increased pain with walking bending lifting twisting turning. She stopped taking Neurontin due to heart palpitations. She has had Bilateral Lumbar RFA  July Left June 2020 with 50% pain relief for 6 months, She had had LESI  10/29/2020 With 60% pain releif.          Medications reviewed. Patient complains of side effects with medicationss. She denies any ER visits since last visit.     Pain scale with out pain medications or at its worst is 8/10.   Pain scale with pain medications or at its best is 4/10.           The patientis allergic to codeine and pcn [penicillins].     Past Medical History  Leonidas French  has a past medical history of Adenomatous colon polyp, Chronic gastritis, Coronary artery spasm ([address] GERD (gastroesophageal reflux disease), Hiatal hernia, Hyperlipidemia, Hypertension, MVP (mitral valve prolapse), Obesity, Class II, BMI 35-39.9, with comorbidity, Osteopenia, Scoliosis (and kyphoscoliosis), idiopathic, and Vitamin D deficiency.     Past Surgical History The patient  has a past surgical history that includes Carpal tunnel release (2007); Hand surgery (2008); Upper gastrointestinal endoscopy (10/2013); Cardiac catheterization (2008); Cardiac catheterization (12/18/2018); Endoscopy, colon, diagnostic (01/27/2019); Nerve Block Lumb Facet Level 1 Bilateral (Bilateral, 2/4/2019); Nerve Block Lumb Facet Level 1 Bilateral (Bilateral, 3/25/2019); Lumbar spine surgery (Right, 5/20/2019); Lumbar spine surgery (Left, 6/17/2019); epidural steroid injection (Bilateral, 8/1/2019); epidural steroid injection (Bilateral, 9/19/2019); Lumbar spine surgery (Right, 12/2/2019); Pain management procedure (Left, 6/15/2020); Pain management procedure (Right, 7/31/2020); Pain management procedure (N/A, 10/29/2020); and Pain management procedure (Left, 12/18/2020).    Family History  This patient's family history includes Alzheimer's Disease in her mother; Heart Attack in her brother and father; Heart Disease in her brother and father; High Blood Pressure in her father and mother.  Nithin Reynolds  reports that she has never smoked.  She has never used smokeless tobacco. She reports that she does not drink alcohol or use drugs.     Medications    Current Medication      Current Outpatient Medications:     pantoprazole (PROTONIX) 40 MG tablet, Take 1 tablet by mouth daily, Disp: 30 tablet, Rfl: 11    simvastatin (ZOCOR) 20 MG tablet, TAKE 1 TABLET BY MOUTH EVERY DAY, Disp: 30 tablet, Rfl: 11    lisinopril (PRINIVIL;ZESTRIL) 20 MG tablet, TAKE 1 TABLET BY MOUTH DAILY SHE NEEDS SEEN FOR MORE, Disp: 90 tablet, Rfl: 3    amitriptyline (ELAVIL) 10 MG tablet, Take 1 tablet by mouth nightly for 14 days, Disp: 14 tablet, Rfl: 0    ibuprofen (ADVIL;MOTRIN) 200 MG tablet, Take 200 mg by mouth every 6 hours as needed for Pain, Disp: , Rfl:     amLODIPine (NORVASC) 2.5 MG tablet, Take 1 tablet by mouth daily, Disp: 90 tablet, Rfl: 3   Cholecalciferol (VITAMIN D3) 5000 UNITS CAPS, Take 1 capsule by mouth daily. , Disp: , Rfl:     aspirin 81 MG tablet, Take 81 mg by mouth daily. , Disp: , Rfl:     calcium carbonate 600 MG TABS tablet, Take 1 tablet by mouth 2 times daily. , Disp: , Rfl:         Subjective:      Review of Systems   Constitutional: Positive for activity change. Negative for chills, diaphoresis, fatigue and fever. Cardiovascular:        CAD  MVP   Genitourinary: Negative for difficulty urinating, frequency and urgency. Musculoskeletal: Positive for arthralgias, back pain, gait problem and myalgias. Negative for neck pain and neck stiffness. Skin: Negative for color change, rash and wound. Allergic/Immunologic: Negative for environmental allergies and food allergies. Neurological: Negative for dizziness, seizures, light-headedness, numbness and headaches. Hematological: Does not bruise/bleed easily. Psychiatric/Behavioral: Negative for sleep disturbance. The patient is not nervous/anxious.          Objective:      Vitals       Vitals:     01/27/21 0913   BP: 112/62   Site: Right Upper Arm   Position: Sitting   Cuff Size: Medium Adult   Pulse: 70   Temp: 97.8 °F (36.6 °C)   TempSrc: Temporal   Weight: 188 lb (85.3 kg)   Height: 5' 2\" (1.575 m)            Physical Exam  Vitals signs reviewed. Constitutional:       General: She is not in acute distress. Appearance: She is well-developed. She is not diaphoretic. HENT:      Head: Normocephalic and atraumatic. Not macrocephalic and not microcephalic. Right Ear: External ear normal.      Left Ear: External ear normal.   Eyes:      General:         Right eye: No discharge. Left eye: No discharge. Conjunctiva/sclera: Conjunctivae normal.   Neck:      Trachea: No tracheal deviation. Pulmonary:      Effort: Pulmonary effort is normal. No respiratory distress. Musculoskeletal:         General: Tenderness present. Lumbar back: She exhibits decreased range of motion, tenderness, bony tenderness, pain and spasm. Back:    Skin:     General: Skin is warm and dry. Coloration: Skin is not pale. Findings: No rash. Neurological:      Mental Status: She is alert and oriented to person, place, and time. Cranial Nerves: No cranial nerve deficit. Psychiatric:         Attention and Perception: She is attentive. Speech: Speech normal.         Behavior: Behavior normal.         Thought Content: Thought content normal.         Judgment: Judgment normal.            Assessment:      1. Spondylosis of lumbar region without myelopathy or radiculopathy    2. Lumbar stenosis with neurogenic claudication    3. SI (sacroiliac) joint inflammation (HCC)    4. Chronic pain syndrome    5. Bulging lumbar disc       Plan:      · OARRS reviewed. Current MED: 0  · Patient was not offered naloxone for home. · Discussed long term side effects of medications, tolerance, dependency and addiction. · Previous UDS reviewed  · UDS preformed today for compliance. · Patient told can not receive any pain medications from any other source. · No evidence of abuse, diversion or aberrant behavior. · Medications and/or procedures to improve function and quality of life- patient understanding with this and that may not be pain free  · Discussed with patient about safe storage of medications at home  · Discussed possible weaning of medication dosing dependent on treatment/procedure results. · Testing:none  · Procedures: Repeat Lumbar RFA right side L2-3,3-4,4-5, pt is on Aspirin   · Discussed with patient about risks with procedure,   including infection, reaction to medication, increased pain, or bleeding.   · Medications:none · If patient is on  prescribed blood thinners will need Pre Op Clearance per Cardiologist or PCP to hold all blood thinners prior to procedure for the required time frame, this is to be completed by the nursing staff during the scheduling process.        Meds.  Prescribed:     Encounter Medications    No orders of the defined types were placed in this encounter.         Return for Lumbar RFA Right side  L2-3, 3-4, 4-5, Follow up after procedure.

## 2021-02-08 NOTE — ANESTHESIA POSTPROCEDURE EVALUATION
Department of Anesthesiology  Postprocedure Note    Patient: Nany Del Valle  MRN: [id number]  YOB: 1950  Date of evaluation: 2/8/2021  Time:  11:22 AM     Procedure Summary     Date: 02/08/21 Room / Location: [facility] OR 03 / [address] [facility]    Anesthesia Start: 6207 Anesthesia Stop: 2336    Procedure: Lumbar RFA Right side  L2-3, 3-4, 4-5 (Right ) Diagnosis: (LUMBAR SPONDYLOSIS)    Surgeons: Reno Weston MD Responsible Provider: Rebekah Haney DO    Anesthesia Type: MAC ASA Status: 3          Anesthesia Type: MAC    Eloy Phase I:      Eloy Phase II: Eloy Score: 10    Last vitals: Reviewed and per EMR flowsheets.        Anesthesia Post Evaluation    Patient location during evaluation: bedside  Patient participation: complete - patient participated  Level of consciousness: awake and alert  Pain score: 2  Airway patency: patent  Nausea & Vomiting: no nausea and no vomiting  Complications: no  Cardiovascular status: hemodynamically stable and blood pressure returned to baseline  Respiratory status: spontaneous ventilation, room air and acceptable  Hydration status: stable

## 2021-02-08 NOTE — H&P
[facility]  History and Physical Update    Pt Name: Simone Ross  MRN: [id number]  YOB: 1950  Date of evaluation: 2/8/2021      I have examined the patient and reviewed the H&P/Consult and there are no changes to the patient or plans.         Electronically signed by Hailey Vaz MD on 2/8/2021 at 8:35 AM

## 2021-02-08 NOTE — OP NOTE
Operative Note  Pre-Procedure Note    Patient Name: John Paul Ingram   YOB: 1950  Medical Record Number: [id number]  Date: 2/8/21    Indication: Lower back pain  Consent: On file. Vital Signs:   Vitals:    02/08/21 0743   BP: 122/60   Pulse: 71   Resp: 16   Temp: 97.7 °F (36.5 °C)   SpO2: 95%       Past Medical History:   has a past medical history of Adenomatous colon polyp, Chronic gastritis, Coronary artery spasm (HCC), GERD (gastroesophageal reflux disease), Hiatal hernia, Hyperlipidemia, Hypertension, MVP (mitral valve prolapse), Obesity, Class II, BMI 35-39.9, with comorbidity, Osteopenia, Scoliosis (and kyphoscoliosis), idiopathic, and Vitamin D deficiency. Past Surgical History:   has a past surgical history that includes Carpal tunnel release (2007); Hand surgery (2008); Upper gastrointestinal endoscopy (10/2013); Cardiac catheterization (2008); Cardiac catheterization (12/18/2018); Endoscopy, colon, diagnostic (01/27/2019); Nerve Block Lumb Facet Level 1 Bilateral (Bilateral, 2/4/2019); Nerve Block Lumb Facet Level 1 Bilateral (Bilateral, 3/25/2019); Lumbar spine surgery (Right, 5/20/2019); Lumbar spine surgery (Left, 6/17/2019); epidural steroid injection (Bilateral, 8/1/2019); epidural steroid injection (Bilateral, 9/19/2019); Lumbar spine surgery (Right, 12/2/2019); Pain management procedure (Left, 6/15/2020); Pain management procedure (Right, 7/31/2020); Pain management procedure (N/A, 10/29/2020); and Pain management procedure (Left, 12/18/2020). Pre-Sedation Documentation and Exam:   Vital signs have been reviewed (see flow sheet for vitals).      Sedation/ Anesthesia Plan:   MAC    Patient is an appropriate candidate for plan of sedation: yes         Preoperative Diagnosis:  L-spondylosis     Post-Op Dx: as above     Procedure Performed:  :Radiofrequency ablation of median branches at the levels of L2-3,L3-4 and L4-5 right  under fluoroscopic guidance     Indication for the Procedure:  The patient has ahistory of chronic low back pain that is not responding well to the conservative treatment.  Patient's pain is mostly axial in nature.  Pain is interfering with the activities of daily living.  Physical examination revealed facet tenderness and facet loading is positive.  Patient had undergone lumbar facet joint injections with pain relief that lasted for only a short period of time and had greater than 70% pain relief with confirmatory median branch blocks.  Hence we decided to do radiofrequency abalation of median branches for long term pain releif.   The procedure and risks  were discussed with the patient and an informed consent was obtained.     Procedure:  Right side   A meaningful communication was kept up with the patient throughout the procedure. The patient is placed in prone position and skin over the back was prepped and draped in sterile manner.  Then using fluoroscopy the junction of the transverse process of the vertebra with the superior process of the facet joint was observed and the view was optimized.  The skin and deep tissues posterior were infiltrated with 10 ml of  1% xylocaine. The RF canula with the 10 mm active tip was introduced through the skin wheal under fluoroscopy guidance such that the tip of the needle lies in the groove of the transverse process with the superior processes of the facet joint.  Then a lateral view of the lumbar spine was obtained to make sure the tip of needle is not in the neural foramen.  Then electric impedence was checked to make sure it is acceptable. Then a sensory stimulus was applied at 50 Hz up to 1 volt and concordant pain symptoms were reproduced. Then a motor stimulus was applied at 2 Hz up to 2 volts and no motor stimulation was seen in lower extremities.  Some multifidus stimulus was seen. Fagan Hue after negative aspiration a mixture of depomedrol 80 mg  and 0.25%  marcaine 1.5 cc  was injected through the needle in divided doses.  Then a  lesion was done at 80 degrees centigrade for 90 seconds.     EBL-0     Patient's vital signs and neurological status remained stable throughout the procedure and post procedural period.  The patient tolerated the procedure well and was discharged home in stable condition.       Electronically signed by Hailey Vaz MD on 2/8/21 at 9:01 AM EST Cheek Interpolation Flap Text: A decision was made to reconstruct the defect utilizing an interpolation axial flap and a staged reconstruction.  A telfa template was made of the defect.  This telfa template was then used to outline the Cheek Interpolation flap.  The donor area for the pedicle flap was then injected with anesthesia.  The flap was excised through the skin and subcutaneous tissue down to the layer of the underlying musculature.  The interpolation flap was carefully excised within this deep plane to maintain its blood supply.  The edges of the donor site were undermined.   The donor site was closed in a primary fashion.  The pedicle was then rotated into position and sutured.  Once the tube was sutured into place, adequate blood supply was confirmed with blanching and refill.  The pedicle was then wrapped with xeroform gauze and dressed appropriately with a telfa and gauze bandage to ensure continued blood supply and protect the attached pedicle.

## 2021-02-08 NOTE — ANESTHESIA PRE PROCEDURE
 Disease of larynx J38.7    Gastroesophageal reflux disease K21.9    Dysphagia R13.10    Deviated nasal septum J34.2    Epistaxis R04.0    Nasal mucositis (ulcerative) J34.81    Multinodular goiter E04.2    Pure hypercholesterolemia E78.00    Essential hypertension I10    Lumbar spondylosis M47.816    Spondylosis of lumbar region without myelopathy or radiculopathy M47.816    Bulging lumbar disc M51.26    Lumbosacral spondylosis without myelopathy M47.817    Anxiety F41.9       Past Medical History:        Diagnosis Date    Adenomatous colon polyp 2012    Chronic gastritis 2013    Coronary artery spasm (HCC)     GERD (gastroesophageal reflux disease) 2002    Hiatal hernia 01/27/2019    Hyperlipidemia     Hypertension 2001    MVP (mitral valve prolapse)     Obesity, Class II, BMI 35-39.9, with comorbidity 2012    Osteopenia 10/2012    Scoliosis (and kyphoscoliosis), idiopathic     Vitamin D deficiency 2012       Past Surgical History:        Procedure Laterality Date    CARDIAC CATHETERIZATION  2008    CARDIAC CATHETERIZATION  12/18/2018    CARPAL TUNNEL RELEASE  2007    right    ENDOSCOPY, COLON, DIAGNOSTIC  01/27/2019    esophagus stretching    EPIDURAL STEROID INJECTION Bilateral 8/1/2019    LESI L4 performed by Gene Diaz MD at [facility] 2 Bilateral 9/19/2019    LESI L4 or other level #2 performed by Gene Diaz MD at [facility] SURGERY  2008    left trigger finger    LUMBAR SPINE SURGERY Right 5/20/2019    LUMBAR RFA, L2-3, L3-4, L4-5, RIGHT SIDE performed by Gene Diaz MD at [facility] Left 6/17/2019    LUMBAR RFA L2-3, L3-4, L4-5, LEFT SIDE performed by Gene Diaz MD at [facility] Right 12/2/2019    Lumbar RFA L2-3,L3-4 and L4-5 right performed by Gene Diaz MD at [address] [place] [address] Lab Results   Component Value Date     08/30/2019    K 3.8 08/30/2019     08/30/2019    CO2 26 08/30/2019    BUN 16 08/30/2019    CREATININE 0.67 08/30/2019    AGRATIO 1.7 08/30/2019    LABGLOM 72 04/10/2018    GLUCOSE 112 08/30/2019    PROT 6.5 08/30/2019    CALCIUM 8.90 08/30/2019    BILITOT 0.5 08/30/2019    ALKPHOS 87 08/30/2019    AST 22 08/30/2019    ALT 19 08/30/2019       POC Tests: No results for input(s): POCGLU, POCNA, POCK, POCCL, POCBUN, POCHEMO, POCHCT in the last 72 hours. Coags:   Lab Results   Component Value Date    PROTIME 12.0 12/14/2018    INR 1.04 12/14/2018       HCG (If Applicable): No results found for: PREGTESTUR, PREGSERUM, HCG, HCGQUANT     ABGs: No results found for: PHART, PO2ART, YPB4EGZ, UHQ6XJX, BEART, A3QLOVOM     Type & Screen (If Applicable):  No results found for: LABABO, LABRH    Drug/Infectious Status (If Applicable):  No results found for: HIV, HEPCAB    COVID-19 Screening (If Applicable):   Lab Results   Component Value Date    COVID19 Not Detected 07/27/2020         Anesthesia Evaluation  Patient summary reviewed and Nursing notes reviewed no history of anesthetic complications:   Airway: Mallampati: II  TM distance: >3 FB   Neck ROM: full  Mouth opening: > = 3 FB Dental:          Pulmonary:Negative Pulmonary ROS and normal exam  breath sounds clear to auscultation                             Cardiovascular:  Exercise tolerance: good (>4 METS),   (+) hypertension:, CAD:,                   Neuro/Psych:   (+) neuromuscular disease:, psychiatric history:            GI/Hepatic/Renal:   (+) hiatal hernia, GERD:,           Endo/Other:              Pt had no PAT visit        ROS comment: Vitamin D deficiency Abdominal:           Vascular: negative vascular ROS. Anesthesia Plan      MAC     ASA 3       Induction: intravenous. Anesthetic plan and risks discussed with patient. Plan discussed with CRNA.

## 2021-02-19 ENCOUNTER — TELEPHONE (OUTPATIENT)
Dept: FAMILY MEDICINE CLINIC | Age: 71
End: 2021-02-19

## 2021-02-19 DIAGNOSIS — J02.8 ACUTE PHARYNGITIS DUE TO OTHER SPECIFIED ORGANISMS: Primary | ICD-10-CM

## 2021-02-19 RX ORDER — AZITHROMYCIN 250 MG/1
TABLET, FILM COATED ORAL
Qty: 1 PACKET | Refills: 0 | Status: SHIPPED | OUTPATIENT
Start: 2021-02-19 | End: 2021-03-01

## 2021-02-19 NOTE — TELEPHONE ENCOUNTER
Pt called having right ear pain x 2 days. No other symptoms. Pt ask if there is an RX or a suggestion of something to help?     Say Nelson    [phone]

## 2021-03-03 ENCOUNTER — VIRTUAL VISIT (OUTPATIENT)
Dept: PHYSICAL MEDICINE AND REHAB | Age: 71
End: 2021-03-03
Payer: MEDICARE

## 2021-03-03 DIAGNOSIS — M47.816 SPONDYLOSIS OF LUMBAR REGION WITHOUT MYELOPATHY OR RADICULOPATHY: Primary | ICD-10-CM

## 2021-03-03 DIAGNOSIS — M46.1 SI (SACROILIAC) JOINT INFLAMMATION (HCC): ICD-10-CM

## 2021-03-03 DIAGNOSIS — M48.062 LUMBAR STENOSIS WITH NEUROGENIC CLAUDICATION: ICD-10-CM

## 2021-03-03 DIAGNOSIS — G89.4 CHRONIC PAIN SYNDROME: ICD-10-CM

## 2021-03-03 PROCEDURE — 1090F PRES/ABSN URINE INCON ASSESS: CPT | Performed by: NURSE PRACTITIONER

## 2021-03-03 PROCEDURE — 99213 OFFICE O/P EST LOW 20 MIN: CPT | Performed by: NURSE PRACTITIONER

## 2021-03-03 PROCEDURE — 4040F PNEUMOC VAC/ADMIN/RCVD: CPT | Performed by: NURSE PRACTITIONER

## 2021-03-03 PROCEDURE — 3017F COLORECTAL CA SCREEN DOC REV: CPT | Performed by: NURSE PRACTITIONER

## 2021-03-03 PROCEDURE — G8428 CUR MEDS NOT DOCUMENT: HCPCS | Performed by: NURSE PRACTITIONER

## 2021-03-03 PROCEDURE — G8400 PT W/DXA NO RESULTS DOC: HCPCS | Performed by: NURSE PRACTITIONER

## 2021-03-03 PROCEDURE — 1123F ACP DISCUSS/DSCN MKR DOCD: CPT | Performed by: NURSE PRACTITIONER

## 2021-03-03 ASSESSMENT — ENCOUNTER SYMPTOMS
COLOR CHANGE: 0
BACK PAIN: 1

## 2021-03-03 NOTE — PROGRESS NOTES
[address] [facility] [address]  Dept: [phone]  Dept Fax: [phone]: [phone]    Visit Date: 3/3/2021    Functionality Assessment/Goals Worksheet     On a scale of 0 (Does not Interfere) to 10 (Completely Interferes)     1. Which number describes how during the past week pain has interfered with       the following:  A. General Activity:  6  B. Mood: 2  C. Walking Ability:  6  D. Normal Work (Includes both work outside the home and housework):  6  E. Relations with Other People:   2  F. Sleep:   2  G. Enjoyment of Life:   4    2. Patient Prefers to Take their Pain Medications:     []  On a regular basis   []  Only when necessary    [x]  Does not take pain medications    3. What are the Patient's Goals/Expectations for Visiting Pain Management? []  Learn about my pain    []  Receive Medication   []  Physical Therapy     []  Treat Depression   [x]  Receive Injections    []  Treat Sleep   []  Deal with Anxiety and Stress   []  Treat Opoid Dependence/Addiction   []  Other:    HPI:   Alex Lucia is a 78 y.o. female is here today for    Chief Complaint: Low back pain  TELEHEALTH EVALUATION -- Audio/Visual (During KFAMK-39 public health emergency)      This service is provided through Telehealth. Patient reports that place of service was home. Provider was located at their officeEvaluation of the following organ systems was limited: Vitals/Constitutional/EENT/Resp/CV/GI//MS/Neuro/Skin/Heme-Lymph-Imm. Pursuant to the emergency declaration under the [address] [place] [address], [address] [place] [address] authority and the [employer] Act, this Virtual Visit was conducted with patient's (and/or legal guardian's) consent, to reduce the patient's risk of exposure to COVID-19 and provide necessary medical care.   This Telehealth audio/visual visit was conducted using [employer]. me. Patient identification was verified at the start of this visit: yes      HPI     F/U  Lumbar RFA Right side L2-3.3-4.4-5 2/8/2020 states 70% pain relief. She went horse back riding this week. Continues to have minimal  low back pain. She had Left lumbar RFA  L2-5 12/18/2020 with 50% pain relief. Her low back pain is all axial. She has had LESI  10/2020 with 60% pain relief  . She denies any ER visits since last visit. Pain scale with out pain medications or at its worst is 4/10. Pain scale with pain medications or at its best is 2/10. The patientis allergic to codeine and pcn [penicillins]. Subjective:      Review of Systems   Constitutional: Positive for activity change. Negative for chills, diaphoresis, fatigue and fever. Cardiovascular:        CAD  MVP   Genitourinary: Negative for difficulty urinating, frequency and urgency. Musculoskeletal: Positive for arthralgias, back pain, gait problem and myalgias. Negative for neck pain and neck stiffness. Skin: Negative for color change, rash and wound. Allergic/Immunologic: Negative for environmental allergies and food allergies. Neurological: Negative for dizziness, seizures, light-headedness, numbness and headaches. Hematological: Does not bruise/bleed easily. Psychiatric/Behavioral: Negative for sleep disturbance. The patient is not nervous/anxious. Objective: There were no vitals filed for this visit. Assessment:     1. Spondylosis of lumbar region without myelopathy or radiculopathy    2. Lumbar stenosis with neurogenic claudication    3. SI (sacroiliac) joint inflammation (HCC)    4. Chronic pain syndrome            Plan:      · OARRS reviewed. Current MED: 0  · Patient was not offered naloxone for home. · Discussed long term side effects of medications, tolerance, dependency and addiction.   · Previous UDS reviewed  · UDS preformed today for compliance. · Patient told can not receive any pain medications from any other source. · No evidence of abuse, diversion or aberrant behavior.  Medications and/or procedures to improve function and quality of life- patient understanding with this and that may not be pain free   Discussed with patient about safe storage of medications at home   Discussed possible weaning of medication dosing dependent on treatment/procedure results.  Testing: none   Procedures: plan repeat Lumbar RFA    Discussed with patient about risks with procedure including infection, reaction to medication, increased pain, or bleeding.  Medications:none   If patient is on blood thinners will need approval to hold:ASPIRIN       Meds. Prescribed:   No orders of the defined types were placed in this encounter. Return in about 12 weeks (around 5/26/2021).                Electronically signed by SHERRY Mcdonald CNP on3/3/2021 at 10:08 AM

## 2021-04-29 PROBLEM — F33.41 RECURRENT MAJOR DEPRESSIVE DISORDER IN PARTIAL REMISSION (HCC): Status: ACTIVE | Noted: 2021-04-29

## 2021-04-29 ASSESSMENT — ENCOUNTER SYMPTOMS
SHORTNESS OF BREATH: 0
CONSTIPATION: 0
SINUS PRESSURE: 0

## 2021-04-30 ENCOUNTER — OFFICE VISIT (OUTPATIENT)
Dept: FAMILY MEDICINE CLINIC | Age: 71
End: 2021-04-30

## 2021-04-30 ENCOUNTER — TELEPHONE (OUTPATIENT)
Dept: FAMILY MEDICINE CLINIC | Age: 71
End: 2021-04-30

## 2021-04-30 VITALS
RESPIRATION RATE: 14 BRPM | HEART RATE: 72 BPM | BODY MASS INDEX: 34.62 KG/M2 | TEMPERATURE: 97.1 F | WEIGHT: 188.13 LBS | HEIGHT: 62 IN | SYSTOLIC BLOOD PRESSURE: 118 MMHG | DIASTOLIC BLOOD PRESSURE: 78 MMHG

## 2021-04-30 DIAGNOSIS — E55.9 VITAMIN D DEFICIENCY: ICD-10-CM

## 2021-04-30 DIAGNOSIS — I10 ESSENTIAL HYPERTENSION: ICD-10-CM

## 2021-04-30 DIAGNOSIS — E04.2 MULTIPLE THYROID NODULES: Primary | ICD-10-CM

## 2021-04-30 DIAGNOSIS — Z12.31 VISIT FOR SCREENING MAMMOGRAM: ICD-10-CM

## 2021-04-30 DIAGNOSIS — E78.00 PURE HYPERCHOLESTEROLEMIA: ICD-10-CM

## 2021-04-30 DIAGNOSIS — F33.41 RECURRENT MAJOR DEPRESSIVE DISORDER IN PARTIAL REMISSION (HCC): ICD-10-CM

## 2021-04-30 DIAGNOSIS — Z78.0 POST-MENOPAUSAL: ICD-10-CM

## 2021-04-30 PROCEDURE — 1036F TOBACCO NON-USER: CPT | Performed by: FAMILY MEDICINE

## 2021-04-30 PROCEDURE — 1090F PRES/ABSN URINE INCON ASSESS: CPT | Performed by: FAMILY MEDICINE

## 2021-04-30 PROCEDURE — G8417 CALC BMI ABV UP PARAM F/U: HCPCS | Performed by: FAMILY MEDICINE

## 2021-04-30 PROCEDURE — 99214 OFFICE O/P EST MOD 30 MIN: CPT | Performed by: FAMILY MEDICINE

## 2021-04-30 PROCEDURE — G8427 DOCREV CUR MEDS BY ELIG CLIN: HCPCS | Performed by: FAMILY MEDICINE

## 2021-04-30 PROCEDURE — 1123F ACP DISCUSS/DSCN MKR DOCD: CPT | Performed by: FAMILY MEDICINE

## 2021-04-30 PROCEDURE — G8400 PT W/DXA NO RESULTS DOC: HCPCS | Performed by: FAMILY MEDICINE

## 2021-04-30 PROCEDURE — 3017F COLORECTAL CA SCREEN DOC REV: CPT | Performed by: FAMILY MEDICINE

## 2021-04-30 PROCEDURE — 4040F PNEUMOC VAC/ADMIN/RCVD: CPT | Performed by: FAMILY MEDICINE

## 2021-04-30 ASSESSMENT — PATIENT HEALTH QUESTIONNAIRE - PHQ9
SUM OF ALL RESPONSES TO PHQ QUESTIONS 1-9: 0
1. LITTLE INTEREST OR PLEASURE IN DOING THINGS: 0
SUM OF ALL RESPONSES TO PHQ QUESTIONS 1-9: 0
2. FEELING DOWN, DEPRESSED OR HOPELESS: 0

## 2021-04-30 NOTE — PROGRESS NOTES
Referral sent to dr. aVleria Moses and they will call patient to schedule.
Trachea: No tracheal deviation. Cardiovascular:      Rate and Rhythm: Normal rate and regular rhythm. Heart sounds: Normal heart sounds. Pulmonary:      Effort: Pulmonary effort is normal.      Breath sounds: Normal breath sounds. Skin:     General: Skin is warm and dry. Neurological:      Mental Status: She is alert and oriented to person, place, and time. Psychiatric:         Behavior: Behavior normal.     Blood pressure 118/78, pulse 72, temperature 97.1 °F (36.2 °C), temperature source Infrared, resp. rate 14, height 5' 2\" (1.575 m), weight 188 lb 2 oz (85.3 kg), not currently breastfeeding. An electronic signature was used to authenticate this note.     --Tobias Posada MD

## 2021-05-11 ENCOUNTER — HOSPITAL ENCOUNTER (OUTPATIENT)
Dept: ULTRASOUND IMAGING | Age: 71
Discharge: HOME | End: 2021-05-11
Payer: MEDICARE

## 2021-05-11 ENCOUNTER — APPOINTMENT (OUTPATIENT)
Dept: WOMENS IMAGING | Age: 71
End: 2021-05-11
Payer: MEDICARE

## 2021-05-11 ENCOUNTER — HOSPITAL ENCOUNTER (OUTPATIENT)
Dept: WOMENS IMAGING | Age: 71
Discharge: HOME | End: 2021-05-11
Payer: MEDICARE

## 2021-05-11 DIAGNOSIS — Z12.31 VISIT FOR SCREENING MAMMOGRAM: ICD-10-CM

## 2021-05-11 DIAGNOSIS — E04.2 MULTIPLE THYROID NODULES: ICD-10-CM

## 2021-05-11 PROCEDURE — 77063 BREAST TOMOSYNTHESIS BI: CPT

## 2021-05-11 PROCEDURE — 76536 US EXAM OF HEAD AND NECK: CPT

## 2021-05-12 ENCOUNTER — TELEPHONE (OUTPATIENT)
Dept: FAMILY MEDICINE CLINIC | Age: 71
End: 2021-05-12

## 2021-05-12 ENCOUNTER — HOSPITAL ENCOUNTER (OUTPATIENT)
Dept: WOMENS IMAGING | Age: 71
Discharge: HOME | End: 2021-05-12

## 2021-05-12 DIAGNOSIS — Z00.6 ENCOUNTER FOR EXAMINATION FOR NORMAL COMPARISON OR CONTROL IN CLINICAL RESEARCH PROGRAM: ICD-10-CM

## 2021-05-12 NOTE — TELEPHONE ENCOUNTER
----- Message from Petra Mishra MD sent at 5/11/2021 11:04 PM EDT -----  Let her know the thyroid US showed the 2 nodules that were biopsied several years ago to be somewhat smaller. I don't feel there is a need to do another US in the future.

## 2021-06-08 ENCOUNTER — TELEPHONE (OUTPATIENT)
Dept: FAMILY MEDICINE CLINIC | Age: 71
End: 2021-06-08

## 2021-06-08 RX ORDER — PREDNISONE 20 MG/1
40 TABLET ORAL DAILY
Qty: 10 TABLET | Refills: 0 | Status: SHIPPED | OUTPATIENT
Start: 2021-06-08 | End: 2021-06-11

## 2021-06-10 DIAGNOSIS — I10 ESSENTIAL HYPERTENSION: ICD-10-CM

## 2021-06-10 DIAGNOSIS — E55.9 VITAMIN D DEFICIENCY: ICD-10-CM

## 2021-06-10 DIAGNOSIS — E78.00 PURE HYPERCHOLESTEROLEMIA: ICD-10-CM

## 2021-06-10 LAB
ALBUMIN SERPL-MCNC: 5 G/DL
ALP BLD-CCNC: 92 U/L
ALT SERPL-CCNC: 27 U/L
AST SERPL-CCNC: 23 U/L
BILIRUB SERPL-MCNC: 0.5 MG/DL (ref 0.1–1.4)
BUN BLDV-MCNC: 37 MG/DL
CALCIUM SERPL-MCNC: 10.4 MG/DL
CHLORIDE BLD-SCNC: 103 MMOL/L
CHOLESTEROL, FASTING: 209
CO2: 19 MMOL/L
CREAT SERPL-MCNC: 0.86 MG/DL
GLUCOSE FASTING: 166 MG/DL
HDLC SERPL-MCNC: 104 MG/DL (ref 35–70)
LDL CHOLESTEROL CALCULATED: 97 MG/DL (ref 0–160)
POTASSIUM SERPL-SCNC: 4.3 MMOL/L
SODIUM BLD-SCNC: 139 MMOL/L
TOTAL PROTEIN: 7.6 G/DL (ref 6.4–8.2)
TRIGLYCERIDE, FASTING: 43
VITAMIN D 25-HYDROXY: 41.1
VITAMIN D2, 25 HYDROXY: NORMAL
VITAMIN D3,25 HYDROXY: NORMAL

## 2021-06-12 DIAGNOSIS — E78.00 PURE HYPERCHOLESTEROLEMIA: Primary | ICD-10-CM

## 2021-06-12 DIAGNOSIS — E55.9 VITAMIN D DEFICIENCY: ICD-10-CM

## 2021-06-14 ENCOUNTER — TELEPHONE (OUTPATIENT)
Dept: FAMILY MEDICINE CLINIC | Age: 71
End: 2021-06-14

## 2021-06-14 NOTE — TELEPHONE ENCOUNTER
----- Message from Petra Mishra MD sent at 6/12/2021  4:55 PM EDT -----  Let her know that the lipids are fine and the vit D is improved. Continue the meds and the diet/weight loss. Check the fasting labs in mid December.

## 2021-06-28 ENCOUNTER — OFFICE VISIT (OUTPATIENT)
Dept: FAMILY MEDICINE CLINIC | Age: 71
End: 2021-06-28

## 2021-06-28 VITALS
WEIGHT: 181.38 LBS | TEMPERATURE: 97.1 F | HEIGHT: 63 IN | SYSTOLIC BLOOD PRESSURE: 126 MMHG | DIASTOLIC BLOOD PRESSURE: 60 MMHG | HEART RATE: 70 BPM | BODY MASS INDEX: 32.14 KG/M2 | RESPIRATION RATE: 16 BRPM

## 2021-06-28 DIAGNOSIS — Z11.59 NEED FOR HEPATITIS C SCREENING TEST: Primary | ICD-10-CM

## 2021-06-28 DIAGNOSIS — Z00.00 ROUTINE GENERAL MEDICAL EXAMINATION AT A HEALTH CARE FACILITY: ICD-10-CM

## 2021-06-28 PROCEDURE — G0438 PPPS, INITIAL VISIT: HCPCS | Performed by: FAMILY MEDICINE

## 2021-06-28 PROCEDURE — 4040F PNEUMOC VAC/ADMIN/RCVD: CPT | Performed by: FAMILY MEDICINE

## 2021-06-28 PROCEDURE — 3017F COLORECTAL CA SCREEN DOC REV: CPT | Performed by: FAMILY MEDICINE

## 2021-06-28 PROCEDURE — 1123F ACP DISCUSS/DSCN MKR DOCD: CPT | Performed by: FAMILY MEDICINE

## 2021-06-28 RX ORDER — LANOLIN ALCOHOL/MO/W.PET/CERES
1000 CREAM (GRAM) TOPICAL DAILY
COMMUNITY

## 2021-06-28 RX ORDER — FERROUS SULFATE 325(65) MG
325 TABLET ORAL
COMMUNITY

## 2021-06-28 SDOH — ECONOMIC STABILITY: FOOD INSECURITY: WITHIN THE PAST 12 MONTHS, THE FOOD YOU BOUGHT JUST DIDN'T LAST AND YOU DIDN'T HAVE MONEY TO GET MORE.: NEVER TRUE

## 2021-06-28 SDOH — ECONOMIC STABILITY: FOOD INSECURITY: WITHIN THE PAST 12 MONTHS, YOU WORRIED THAT YOUR FOOD WOULD RUN OUT BEFORE YOU GOT MONEY TO BUY MORE.: NEVER TRUE

## 2021-06-28 ASSESSMENT — PATIENT HEALTH QUESTIONNAIRE - PHQ9
2. FEELING DOWN, DEPRESSED OR HOPELESS: 0
SUM OF ALL RESPONSES TO PHQ QUESTIONS 1-9: 0
SUM OF ALL RESPONSES TO PHQ QUESTIONS 1-9: 0
SUM OF ALL RESPONSES TO PHQ9 QUESTIONS 1 & 2: 0
1. LITTLE INTEREST OR PLEASURE IN DOING THINGS: 0
SUM OF ALL RESPONSES TO PHQ QUESTIONS 1-9: 0

## 2021-06-28 ASSESSMENT — LIFESTYLE VARIABLES: HOW OFTEN DO YOU HAVE A DRINK CONTAINING ALCOHOL: 0

## 2021-06-28 ASSESSMENT — SOCIAL DETERMINANTS OF HEALTH (SDOH): HOW HARD IS IT FOR YOU TO PAY FOR THE VERY BASICS LIKE FOOD, HOUSING, MEDICAL CARE, AND HEATING?: NOT HARD AT ALL

## 2021-06-28 NOTE — PROGRESS NOTES
reflux disease) 2002    Hiatal hernia 01/27/2019    Hyperlipidemia     Hypertension 2001    MVP (mitral valve prolapse)     Obesity, Class II, BMI 35-39.9, with comorbidity 2012    Osteopenia 10/2012    Scoliosis (and kyphoscoliosis), idiopathic     Vitamin D deficiency 2012       Past Surgical History:   Procedure Laterality Date    CARDIAC CATHETERIZATION  2008    CARDIAC CATHETERIZATION  12/18/2018    CARPAL TUNNEL RELEASE  2007    right    ENDOSCOPY, COLON, DIAGNOSTIC  01/27/2019    esophagus stretching    EPIDURAL STEROID INJECTION Bilateral 8/1/2019    LESI L4 performed by Hong Celestin MD at [facility] 2 Bilateral 9/19/2019    LESI L4 or other level #2 performed by Hong Celestin MD at [address] [place] [address] [facility] SURGERY  2008    left trigger finger    LUMBAR SPINE SURGERY Right 5/20/2019    LUMBAR RFA, L2-3, L3-4, L4-5, RIGHT SIDE performed by Hong Celestin MD at [facility] Left 6/17/2019    LUMBAR RFA L2-3, L3-4, L4-5, LEFT SIDE performed by Hong Celestin MD at [facility] Right 12/2/2019    Lumbar RFA L2-3,L3-4 and L4-5 right performed by Hong Celestin MD at [place] [address] FACET LEVEL 1 BILATERAL Bilateral 2/4/2019    LUMBAR FACET MBB @ L2-3,  L3-4,  L4-5   BILATERAL performed by Hong Celestin MD at [place] [address] FACET LEVEL 1 BILATERAL Bilateral 3/25/2019    LUMBAR FACET MBB @ L2-3,  L3-4,  L4-5   BILATERAL performed by Hong Celestin MD at [facility] Left 6/15/2020    Lumbar RFA  L2-3,L3-4 and L4-5 left, performed by Hong Celestin MD at [facility] Right 7/31/2020    Lumbar RFA L2-3,L3-4 and L4-5 right performed by Hong Celestin MD at [address] [place] [address] MANAGEMENT PROCEDURE N/A 10/29/2020    LESI L4 performed by Suzette Blunt MD at [facility] 113 Left 12/18/2020    Lumbar RFA L2-3, L3-4 and L4-5 left after 12/15/2020 performed by Suzette Blunt MD at [facility] 113 Right 2/8/2021    Lumbar RFA Right side  L2-3, 3-4, 4-5 performed by Suzette Blunt MD at [address] [place] [address]  10/2013       Family History   Problem Relation Age of Onset    Alzheimer's Disease Mother     High Blood Pressure Mother     Heart Disease Father     Heart Attack Father     High Blood Pressure Father     Heart Disease Brother     Heart Attack Brother     Diabetes Neg Hx     Cancer Neg Hx     Stroke Neg Hx        CareTeam (Including outside providers/suppliers regularly involved in providing care):   Patient Care Team:  Charissa Ghosh MD as PCP - General (Family Medicine)  Charissa Ghosh MD as PCP - [facility] Empaneled Provider  Riki Flores MD as Consulting Physician (Gastroenterology)  Katelyn Castillo MD as Consulting Physician (Orthopedic Surgery)  Joe Amanda DO as Consulting Physician (Cardiology)    Wt Readings from Last 3 Encounters:   06/28/21 181 lb 6 oz (82.3 kg)   04/30/21 188 lb 2 oz (85.3 kg)   02/08/21 185 lb (83.9 kg)     Vitals:    06/28/21 1236   BP: 126/60   Site: Right Upper Arm   Position: Sitting   Cuff Size: Medium Adult   Pulse: 70   Resp: 16   Temp: 97.1 °F (36.2 °C)   TempSrc: Skin   Weight: 181 lb 6 oz (82.3 kg)   Height: 5' 3\" (1.6 m)     Body mass index is 32.13 kg/m². Based upon direct observation of the patient, evaluation of cognition reveals recent and remote memory intact.     General Appearance: alert and oriented to person, place and time, well-developed and well-nourished, in no acute distress  Skin: warm and dry, no rash or erythema  Head: normocephalic and atraumatic  Pulmonary/Chest: clear to auscultation bilaterally- no wheezes, rales or rhonchi, normal air movement, no respiratory distress  Cardiovascular: normal rate, normal S1 and S2, no murmurs and no gallops  Abdomen: soft, non-tender, non-distended, normal bowel sounds, no masses or organomegaly  Extremities: no cyanosis and no clubbing  Musculoskeletal: normal range of motion, no joint swelling, deformity or tenderness  Neurologic: gait and coordination normal and speech normal    Patient's complete Health Risk Assessment and screening values have been reviewed and are found in Flowsheets. The following problems were reviewed today and where indicated follow up appointments were made and/or referrals ordered. Positive Risk Factor Screenings with Interventions:          General Health and ACP:  General  In general, how would you say your health is?: Very Good  In the past 7 days, have you experienced any of the following?  New or Increased Pain, New or Increased Fatigue, Loneliness, Social Isolation, Stress or Anger?: None of These  Do you get the social and emotional support that you need?: Yes  Do you have a Living Will?: Yes  Advance Directives     Power of [address] [place] [address] Will ACP-Advance Directive ACP-Power of     Not on File Not on File Not on File Not on File      General Health Risk Interventions:  · we reviewed the answers    Health Habits/Nutrition:  Health Habits/Nutrition  Do you exercise for at least 20 minutes 2-3 times per week?: Yes  Have you lost any weight without trying in the past 3 months?: No  Do you eat only one meal per day?: No  Have you seen the dentist within the past year?: Yes  Body mass index: (!) 32.13  Health Habits/Nutrition Interventions:  · the weight is going down     Safety:  Safety  Do you have working smoke detectors?: Yes  Have all throw rugs been removed or fastened?: (!) No  Do you have non-slip mats or surfaces in all bathtubs/showers?: Yes  Do all of your stairways have a railing or banister?:

## 2021-06-28 NOTE — PATIENT INSTRUCTIONS
Personalized Preventive Plan for Lewis Nuno - 6/28/2021  Medicare offers a range of preventive health benefits. Some of the tests and screenings are paid in full while other may be subject to a deductible, co-insurance, and/or copay. Some of these benefits include a comprehensive review of your medical history including lifestyle, illnesses that may run in your family, and various assessments and screenings as appropriate. After reviewing your medical record and screening and assessments performed today your provider may have ordered immunizations, labs, imaging, and/or referrals for you. A list of these orders (if applicable) as well as your Preventive Care list are included within your After Visit Summary for your review. Other Preventive Recommendations:    · A preventive eye exam performed by an eye specialist is recommended every 1-2 years to screen for glaucoma; cataracts, macular degeneration, and other eye disorders. · A preventive dental visit is recommended every 6 months. · Try to get at least 150 minutes of exercise per week or 10,000 steps per day on a pedometer . · Order or download the FREE \"Exercise & Physical Activity: Your Everyday Guide\" from [employer]. Call 5-189.421.1161 or search [employer] online. · You need 4662-2823 mg of calcium and 3056-2811 IU of vitamin D per day. It is possible to meet your calcium requirement with diet alone, but a vitamin D supplement is usually necessary to meet this goal.  · When exposed to the sun, use a sunscreen that protects against both UVA and UVB radiation with an SPF of 30 or greater. Reapply every 2 to 3 hours or after sweating, drying off with a towel, or swimming. · Always wear a seat belt when traveling in a car. Always wear a helmet when riding a bicycle or motorcycle.

## 2021-07-14 ENCOUNTER — TELEPHONE (OUTPATIENT)
Dept: FAMILY MEDICINE CLINIC | Age: 71
End: 2021-07-14

## 2021-07-14 NOTE — TELEPHONE ENCOUNTER
Sandy is missing the list of medications that was supposed to be sent over too.      Fax over at [phone]

## 2021-08-02 ENCOUNTER — HOSPITAL ENCOUNTER (OUTPATIENT)
Dept: WOMENS IMAGING | Age: 71
Discharge: HOME | End: 2021-08-02
Payer: MEDICARE

## 2021-08-02 DIAGNOSIS — Z78.0 POST-MENOPAUSAL: ICD-10-CM

## 2021-08-02 PROCEDURE — 77080 DXA BONE DENSITY AXIAL: CPT

## 2021-08-03 ENCOUNTER — OFFICE VISIT (OUTPATIENT)
Dept: PHYSICAL MEDICINE AND REHAB | Age: 71
End: 2021-08-03
Payer: MEDICARE

## 2021-08-03 VITALS
WEIGHT: 181 LBS | HEIGHT: 63 IN | DIASTOLIC BLOOD PRESSURE: 72 MMHG | BODY MASS INDEX: 32.07 KG/M2 | HEART RATE: 70 BPM | SYSTOLIC BLOOD PRESSURE: 128 MMHG

## 2021-08-03 DIAGNOSIS — M51.36 BULGING LUMBAR DISC: ICD-10-CM

## 2021-08-03 DIAGNOSIS — M46.1 SI (SACROILIAC) JOINT INFLAMMATION (HCC): ICD-10-CM

## 2021-08-03 DIAGNOSIS — M48.062 LUMBAR STENOSIS WITH NEUROGENIC CLAUDICATION: ICD-10-CM

## 2021-08-03 DIAGNOSIS — G89.4 CHRONIC PAIN SYNDROME: ICD-10-CM

## 2021-08-03 DIAGNOSIS — M47.816 SPONDYLOSIS OF LUMBAR REGION WITHOUT MYELOPATHY OR RADICULOPATHY: Primary | ICD-10-CM

## 2021-08-03 DIAGNOSIS — M54.50 LUMBAR PAIN: ICD-10-CM

## 2021-08-03 PROCEDURE — 1036F TOBACCO NON-USER: CPT | Performed by: NURSE PRACTITIONER

## 2021-08-03 PROCEDURE — 3017F COLORECTAL CA SCREEN DOC REV: CPT | Performed by: NURSE PRACTITIONER

## 2021-08-03 PROCEDURE — 1090F PRES/ABSN URINE INCON ASSESS: CPT | Performed by: NURSE PRACTITIONER

## 2021-08-03 PROCEDURE — 99214 OFFICE O/P EST MOD 30 MIN: CPT | Performed by: NURSE PRACTITIONER

## 2021-08-03 PROCEDURE — G8417 CALC BMI ABV UP PARAM F/U: HCPCS | Performed by: NURSE PRACTITIONER

## 2021-08-03 PROCEDURE — G8427 DOCREV CUR MEDS BY ELIG CLIN: HCPCS | Performed by: NURSE PRACTITIONER

## 2021-08-03 PROCEDURE — 4040F PNEUMOC VAC/ADMIN/RCVD: CPT | Performed by: NURSE PRACTITIONER

## 2021-08-03 PROCEDURE — G8399 PT W/DXA RESULTS DOCUMENT: HCPCS | Performed by: NURSE PRACTITIONER

## 2021-08-03 PROCEDURE — 1123F ACP DISCUSS/DSCN MKR DOCD: CPT | Performed by: NURSE PRACTITIONER

## 2021-08-03 RX ORDER — TIZANIDINE 2 MG/1
2 TABLET ORAL EVERY 8 HOURS PRN
Qty: 90 TABLET | Refills: 1 | Status: SHIPPED | OUTPATIENT
Start: 2021-08-03 | End: 2021-09-02

## 2021-08-03 ASSESSMENT — ENCOUNTER SYMPTOMS
COLOR CHANGE: 0
BACK PAIN: 1

## 2021-08-03 NOTE — PROGRESS NOTES
[facility] [address]  Dept: [phone]  Dept Fax: [phone]: [phone]    Visit Date: 8/3/2021    Functionality Assessment/Goals Worksheet     On a scale of 0 (Does not Interfere) to 10 (Completely Interferes)     1. Which number describes how during the past week pain has interfered with       the following:  A. General Activity:  8  B. Mood: 8  C. Walking Ability:  9  D. Normal Work (Includes both work outside the home and housework):  8  E. Relations with Other People:   2  F. Sleep:   2  G. Enjoyment of Life:   8    2. Patient Prefers to Take their Pain Medications:     []  On a regular basis   [x]  Only when necessary    []  Does not take pain medications    3. What are the Patient's Goals/Expectations for Visiting Pain Management? []  Learn about my pain    []  Receive Medication   []  Physical Therapy     []  Treat Depression   [x]  Receive Injections    []  Treat Sleep   []  Deal with Anxiety and Stress   []  Treat Opoid Dependence/Addiction   []  Other:      HPI:   Raj Silva is a 78 y.o. female is here today for    Chief Complaint: Low back pain  SI pain   HPI   F/U Continues to have low back pain her Lumbar RFAs have worn off. She takes Tylenol and Motrin. She had Lumbar RFA Right side L2-3.3-4.4-5 2/8/2020 states 70% pain relief for 6 months. . She had Left lumbar RFA  L2-5 12/18/2020 with 50% pain relief for 8 months. .  Her low back pain is all axial. She has had LESI  10/2020 with 60% pain relief for 3-4 months  Pain increases with bending, lifting, twisting , turning torso, reaching, pushing, pulling, walking, standing, stairs and getting up and down.   Treatments Tried PT, ice, heat, Chiropractor, NSAIDS, muscle relaxer, OTC rubs creams patches, steroid burst and injections  Pain Description sharp, shooting, stabbing, burning and aching     She denies any ER visits since last visit. Pain scale with out pain medications or at its worst is 8/10. Pain scale with pain medications or at its best is 2/10. The patientis allergic to codeine, neomycin, and pcn [penicillins]. Subjective:      Review of Systems   Constitutional: Positive for activity change. Negative for chills, diaphoresis, fatigue and fever. Cardiovascular:        CAD  MVP   Genitourinary: Negative for difficulty urinating, frequency and urgency. Musculoskeletal: Positive for arthralgias, back pain, gait problem and myalgias. Negative for neck pain and neck stiffness. Skin: Negative for color change, rash and wound. Allergic/Immunologic: Negative for environmental allergies and food allergies. Neurological: Negative for dizziness, seizures, light-headedness, numbness and headaches. Hematological: Does not bruise/bleed easily. Psychiatric/Behavioral: Negative for sleep disturbance. The patient is not nervous/anxious. Objective:     Vitals:    08/03/21 1033   BP: 128/72   Site: Left Upper Arm   Position: Sitting   Cuff Size: Medium Adult   Pulse: 70   Weight: 181 lb (82.1 kg)   Height: 5' 3\" (1.6 m)       Physical Exam  Vitals reviewed. Constitutional:       General: She is not in acute distress. Appearance: She is well-developed. She is not diaphoretic. HENT:      Head: Normocephalic and atraumatic. Not macrocephalic and not microcephalic. Right Ear: External ear normal.      Left Ear: External ear normal.   Eyes:      General:         Right eye: No discharge. Left eye: No discharge. Conjunctiva/sclera: Conjunctivae normal.   Neck:      Trachea: No tracheal deviation. Pulmonary:      Effort: Pulmonary effort is normal. No respiratory distress. Musculoskeletal:         General: Tenderness present. Lumbar back: Spasms, tenderness and bony tenderness present. Decreased range of motion.         Back:    Skin: General: Skin is warm and dry. Coloration: Skin is not pale. Findings: No rash. Neurological:      Mental Status: She is alert and oriented to person, place, and time. Cranial Nerves: No cranial nerve deficit. Psychiatric:         Attention and Perception: She is attentive. Speech: Speech normal.         Behavior: Behavior normal.         Thought Content: Thought content normal.         Judgment: Judgment normal.       ANN test:+   Yeomans test:+  Gaenslen test:+     Assessment:     1. Spondylosis of lumbar region without myelopathy or radiculopathy    2. Lumbar stenosis with neurogenic claudication    3. SI (sacroiliac) joint inflammation (HCC)    4. Chronic pain syndrome    5. Bulging lumbar disc    6. Lumbar pain            Plan:      · OARRS reviewed. Current MED: 0  · Patient was not offered naloxone for home. · Discussed long term side effects of medications, tolerance, dependency and addiction. · Previous UDS reviewed  · UDS preformed today for compliance. · Patient told can not receive any pain medications from any other source. · No evidence of abuse, diversion or aberrant behavior.  Medications and/or procedures to improve function and quality of life- patient understanding with this and that may not be pain free   Discussed with patient about safe storage of medications at home   Discussed possible weaning of medication dosing dependent on treatment/procedure results.  Testing: none   Procedures: Repeat Lumbar RFA Bilateral if able  L3-4,4-5 otherwise left side first   Discussed with patient about risks with procedure including infection, reaction to medication, increased pain, or bleeding.  Medications:Has Tramadol at home doesn't work has tried [employer] work. Trial Zanaflex medication reviewed   If patient is on blood thinners will need approval to hold:ASPIRIN      Meds.  Prescribed:   Orders Placed This Encounter   Medications    tiZANidine (Jose Manuel Delgado) 2 MG tablet     Sig: Take 1 tablet by mouth every 8 hours as needed (spasms)     Dispense:  90 tablet     Refill:  1       Return for Lumbar RFA Bilateral if able L3-4, 4-5  otherwise left side first , Follow up after procedure.                Electronically signed by SHERRY Lopez CNP on8/3/2021 at 11:01 AM

## 2021-08-09 ENCOUNTER — TELEPHONE (OUTPATIENT)
Dept: FAMILY MEDICINE CLINIC | Age: 71
End: 2021-08-09

## 2021-08-09 ENCOUNTER — APPOINTMENT (OUTPATIENT)
Dept: GENERAL RADIOLOGY | Age: 71
End: 2021-08-09
Payer: MEDICARE

## 2021-08-09 ENCOUNTER — HOSPITAL ENCOUNTER (EMERGENCY)
Age: 71
Discharge: HOME | End: 2021-08-09
Payer: MEDICARE

## 2021-08-09 VITALS
HEIGHT: 62 IN | WEIGHT: 177 LBS | RESPIRATION RATE: 16 BRPM | SYSTOLIC BLOOD PRESSURE: 133 MMHG | TEMPERATURE: 97.6 F | HEART RATE: 81 BPM | BODY MASS INDEX: 32.57 KG/M2 | DIASTOLIC BLOOD PRESSURE: 87 MMHG | OXYGEN SATURATION: 97 %

## 2021-08-09 DIAGNOSIS — S93.401A SPRAIN OF RIGHT ANKLE, UNSPECIFIED LIGAMENT, INITIAL ENCOUNTER: Primary | ICD-10-CM

## 2021-08-09 DIAGNOSIS — M85.89 OSTEOPENIA OF MULTIPLE SITES: Primary | ICD-10-CM

## 2021-08-09 DIAGNOSIS — S90.31XA CONTUSION OF RIGHT FOOT, INITIAL ENCOUNTER: ICD-10-CM

## 2021-08-09 PROCEDURE — 73630 X-RAY EXAM OF FOOT: CPT

## 2021-08-09 PROCEDURE — 99213 OFFICE O/P EST LOW 20 MIN: CPT | Performed by: NURSE PRACTITIONER

## 2021-08-09 PROCEDURE — 99213 OFFICE O/P EST LOW 20 MIN: CPT

## 2021-08-09 PROCEDURE — 73610 X-RAY EXAM OF ANKLE: CPT

## 2021-08-09 RX ORDER — RALOXIFENE HYDROCHLORIDE 60 MG/1
60 TABLET, FILM COATED ORAL DAILY
Qty: 90 TABLET | Refills: 3 | Status: SHIPPED | OUTPATIENT
Start: 2021-08-09 | End: 2022-04-25 | Stop reason: SDUPTHER

## 2021-08-09 RX ORDER — ACETAMINOPHEN 325 MG/1
650 TABLET ORAL EVERY 6 HOURS PRN
Qty: 120 TABLET | Refills: 3 | COMMUNITY
Start: 2021-08-09

## 2021-08-09 ASSESSMENT — ENCOUNTER SYMPTOMS
NAUSEA: 0
VOMITING: 0
SHORTNESS OF BREATH: 0
COLOR CHANGE: 1

## 2021-08-09 ASSESSMENT — PAIN SCALES - GENERAL: PAINLEVEL_OUTOF10: 5

## 2021-08-09 ASSESSMENT — PAIN DESCRIPTION - LOCATION: LOCATION: ANKLE

## 2021-08-09 NOTE — TELEPHONE ENCOUNTER
Starla Izaguirre informed by Phone  Patient last week donated plasma and passed out. Wondering if she should donate again. Before donation was on atb and prednisone  Wondering if that had anything to do with it. .. please advise?  [facility] [address]

## 2021-08-09 NOTE — TELEPHONE ENCOUNTER
If she ate a large protein filled meal 2 hours before the donation and drank multiple glasses of water for 24 hours before then she should wait 3 months and try again if she wishes. If she had not ate and drank as above then she should do so before her next planned donation now.

## 2021-08-09 NOTE — ED PROVIDER NOTES
[facility]  Urgent Care Encounter       CHIEF COMPLAINT       Chief Complaint   Patient presents with   Diane Grant     passed out after donating plasma    Ankle Pain     right    Foot Pain       Nurses Notes reviewed and I agree except as noted in the HPI. HISTORY OF PRESENT ILLNESS   Aylin Lechuga is a 78 y.o. female who presents following a fall a few days ago while at home. She states she donated plasma and was at home passed out and is unsure what happened to her foot. However, she complains of discomfort to her ankle and toes of her right foot. She states she has bruising to the second, third, and fourth digits. She admits to swelling over right ankle. She is able to move her ankle and toes well. However, she does admit to some discomfort as well. She has been wrapping her ankle with an Ace wrap and taking over-the-counter antipyretics for discomfort. She also has used ice. There has been no improvement in symptoms. She denies any numbness and tingling. The history is provided by the patient. REVIEW OF SYSTEMS     Review of Systems   Respiratory: Negative for shortness of breath. Cardiovascular: Negative for chest pain. Gastrointestinal: Negative for nausea and vomiting. Musculoskeletal: Positive for arthralgias, joint swelling and myalgias. Skin: Positive for color change (bruising). Negative for rash and wound. Neurological: Negative for weakness, numbness and headaches.        PAST MEDICAL HISTORY         Diagnosis Date    Adenomatous colon polyp 2012    Chronic gastritis 2013    Coronary artery spasm (HCC)     GERD (gastroesophageal reflux disease) 2002    Hiatal hernia 01/27/2019    Hyperlipidemia     Hypertension 2001    MVP (mitral valve prolapse)     Obesity, Class II, BMI 35-39.9, with comorbidity 2012    Osteopenia 10/2012    Scoliosis (and kyphoscoliosis), idiopathic     Vitamin D deficiency 2012       SURGICALHISTORY     Patient  has Administered    COVID-19, Pfizer, PF, 30mcg/0.3mL 03/05/2021, 03/26/2021    Influenza Virus Vaccine 10/20/2012, 09/30/2019    Influenza, Janas Rota, IM, (6 mo and older Fluzone, Flulaval, Fluarix and 3 yrs and older Afluria) 09/30/2019    Influenza, Janas Rota, Recombinant, IM PF (Flublok 18 yrs and older) 10/16/2020    Pneumococcal Conjugate 13-valent (Suresh Guillaume) 10/09/2018    Pneumococcal Polysaccharide ([id number]) 10/30/2019       FAMILY HISTORY     Patient's family history includes Alzheimer's Disease in her mother; Heart Attack in her brother and father; Heart Disease in her brother and father; High Blood Pressure in her father and mother. SOCIAL HISTORY     Patient  reports that she has never smoked. She has never used smokeless tobacco. She reports that she does not drink alcohol and does not use drugs. PHYSICAL EXAM     ED TRIAGE VITALS  BP: 133/87, Temp: 97.6 °F (36.4 °C), Pulse: 81, Resp: 16, SpO2: 97 %,Estimated body mass index is 32.37 kg/m² as calculated from the following:    Height as of this encounter: 5' 2\" (1.575 m). Weight as of this encounter: 177 lb (80.3 kg). ,No LMP recorded. Patient is postmenopausal.    Physical Exam  Vitals and nursing note reviewed. Constitutional:       General: She is not in acute distress. Cardiovascular:      Rate and Rhythm: Normal rate and regular rhythm. Pulmonary:      Effort: Pulmonary effort is normal.   Musculoskeletal:         General: Swelling, tenderness and signs of injury present. No deformity. Right ankle: Swelling present. Tenderness present over the lateral malleolus. Normal range of motion. Right foot: Normal range of motion. Feet:    Neurological:      Mental Status: She is alert. DIAGNOSTIC RESULTS     Labs:No results found for this visit on 08/09/21. IMAGING:    XR ANKLE RIGHT (MIN 3 VIEWS)   Final Result         1. No acute fracture noted.    2. Plantar spur and spurring at the attachment of the Achilles tendon upon the calcaneus. 3. Mild degenerative changes. .               **This report has been created using voice recognition software. It may contain minor errors which are inherent in voice recognition technology. **      Final report electronically signed by DR Samantha Archibald on 8/9/2021 11:32 AM      XR FOOT RIGHT (MIN 3 VIEWS)   Final Result      1. Degenerative change and hallux valgus. 2. Diffuse osteopenia. 3. Flattening of the head of the second metatarsal.   4. Plantar spur. Spurring at the attachment of the Achilles tendon upon the calcaneus. 5. No acute fracture noted. **This report has been created using voice recognition software. It may contain minor errors which are inherent in voice recognition technology. **      Final report electronically signed by DR Samantha Archibald on 8/9/2021 11:37 AM          I have independently reviewed the radiology images as well as the radiologist's report and have discussed them with the patient. EKG:  None    URGENT CARE COURSE:     Vitals:    08/09/21 1106   BP: 133/87   Pulse: 81   Resp: 16   Temp: 97.6 °F (36.4 °C)   TempSrc: Temporal   SpO2: 97%   Weight: 177 lb (80.3 kg)   Height: 5' 2\" (1.575 m)       Medications - No data to display       PROCEDURES:  None    FINAL IMPRESSION      1. Sprain of right ankle, unspecified ligament, initial encounter    2. Contusion of right foot, initial encounter      DISPOSITION/ PLAN   DISPOSITION Decision To Discharge 08/09/2021 11:39:38 AM     No acute fracture or dislocation. Exam consistent with sprain of the right ankle and contusion of the distal 3 middle toes. Advised to continue treatment of rest, ice, compression, and elevation at home. Increase oral fluid and solid food intake prior to giving plasma next time. Follow-up with PCP in 10 days if worsens or fails to improve.     PATIENT REFERRED TO:  Mina Perez MD  [address] [place] [address] / MADDI CURRAN [facility] [id number]      DISCHARGE MEDICATIONS:  New Prescriptions ACETAMINOPHEN (AMINOFEN) 325 MG TABLET    Take 2 tablets by mouth every 6 hours as needed for Pain       Discontinued Medications    No medications on file       Current Discharge Medication List          SHERRY Avery - CNP    (Please note that portions of this note were completed with a voice recognition program. Efforts were made to edit the dictations but occasionally words are mis-transcribed.)           SHERRY Avery - CNP  08/09/21 1146

## 2021-08-09 NOTE — ED NOTES
To [facility] with complaints of right ankle and foot pain. States she passed out on last Tuesday after giving plasma. Unsure what she did to her foot and ankle but has bruising on 2nd and 3rd toes and swelling to ankle area.  Ambulatory to room     Valdo Beard RN  08/09/21 9844

## 2021-08-10 ENCOUNTER — TELEPHONE (OUTPATIENT)
Dept: FAMILY MEDICINE CLINIC | Age: 71
End: 2021-08-10

## 2021-08-10 NOTE — TELEPHONE ENCOUNTER
Pt was given Zanaflex from back doctor, but she passed out last week and the side effects list light headedness and dizziness.  Wants to know will it be okay to take this after her episode last week

## 2021-08-25 ENCOUNTER — TELEPHONE (OUTPATIENT)
Dept: FAMILY MEDICINE CLINIC | Age: 71
End: 2021-08-25

## 2021-08-25 NOTE — TELEPHONE ENCOUNTER
Gini Soler from Dr Arias Brood office called req to schedule a pre op. Patient is having a rt tube and rt eustachian tube dilation on Sept 20th.     Please call Gini Soler from Dr Hugo Lyon office

## 2021-08-26 NOTE — TELEPHONE ENCOUNTER
MOM for patient to call back to inform of pre op appt and to get all pre op testing done that Dr Joseph Prudent ordered before appt.

## 2021-09-02 ENCOUNTER — HOSPITAL ENCOUNTER (OUTPATIENT)
Age: 71
Discharge: HOME | End: 2021-09-02
Payer: MEDICARE

## 2021-09-02 LAB
ANION GAP SERPL CALCULATED.3IONS-SCNC: 12 MEQ/L (ref 8–16)
BASOPHILS # BLD: 0.3 %
BASOPHILS ABSOLUTE: 0 THOU/MM3 (ref 0–0.1)
BUN BLDV-MCNC: 29 MG/DL (ref 7–22)
CALCIUM SERPL-MCNC: 9.8 MG/DL (ref 8.5–10.5)
CHLORIDE BLD-SCNC: 106 MEQ/L (ref 98–111)
CO2: 25 MEQ/L (ref 23–33)
CREAT SERPL-MCNC: 0.6 MG/DL (ref 0.4–1.2)
EKG ATRIAL RATE: 63 BPM
EKG P AXIS: -17 DEGREES
EKG P-R INTERVAL: 148 MS
EKG Q-T INTERVAL: 426 MS
EKG QRS DURATION: 88 MS
EKG QTC CALCULATION (BAZETT): 435 MS
EKG R AXIS: -40 DEGREES
EKG T AXIS: 37 DEGREES
EKG VENTRICULAR RATE: 63 BPM
EOSINOPHIL # BLD: 2.9 %
EOSINOPHILS ABSOLUTE: 0.2 THOU/MM3 (ref 0–0.4)
ERYTHROCYTE [DISTWIDTH] IN BLOOD BY AUTOMATED COUNT: 13.3 % (ref 11.5–14.5)
ERYTHROCYTE [DISTWIDTH] IN BLOOD BY AUTOMATED COUNT: 48 FL (ref 35–45)
GFR SERPL CREATININE-BSD FRML MDRD: > 90 ML/MIN/1.73M2
GLUCOSE BLD-MCNC: 108 MG/DL (ref 70–108)
HCT VFR BLD CALC: 43.3 % (ref 37–47)
HEMOGLOBIN: 13.6 GM/DL (ref 12–16)
IMMATURE GRANS (ABS): 0.04 THOU/MM3 (ref 0–0.07)
IMMATURE GRANULOCYTES: 0.6 %
LYMPHOCYTES # BLD: 31.9 %
LYMPHOCYTES ABSOLUTE: 2.2 THOU/MM3 (ref 1–4.8)
MCH RBC QN AUTO: 30.8 PG (ref 26–33)
MCHC RBC AUTO-ENTMCNC: 31.4 GM/DL (ref 32.2–35.5)
MCV RBC AUTO: 98.2 FL (ref 81–99)
MONOCYTES # BLD: 11.2 %
MONOCYTES ABSOLUTE: 0.8 THOU/MM3 (ref 0.4–1.3)
NUCLEATED RED BLOOD CELLS: 0 /100 WBC
PLATELET # BLD: 235 THOU/MM3 (ref 130–400)
PMV BLD AUTO: 10.3 FL (ref 9.4–12.4)
POTASSIUM SERPL-SCNC: 4.7 MEQ/L (ref 3.5–5.2)
RBC # BLD: 4.41 MILL/MM3 (ref 4.2–5.4)
SEG NEUTROPHILS: 53.1 %
SEGMENTED NEUTROPHILS ABSOLUTE COUNT: 3.7 THOU/MM3 (ref 1.8–7.7)
SODIUM BLD-SCNC: 143 MEQ/L (ref 135–145)
WBC # BLD: 6.9 THOU/MM3 (ref 4.8–10.8)

## 2021-09-02 PROCEDURE — 93010 ELECTROCARDIOGRAM REPORT: CPT | Performed by: NUCLEAR MEDICINE

## 2021-09-02 PROCEDURE — 93005 ELECTROCARDIOGRAM TRACING: CPT | Performed by: PHYSICIAN ASSISTANT

## 2021-09-02 PROCEDURE — 36415 COLL VENOUS BLD VENIPUNCTURE: CPT

## 2021-09-02 PROCEDURE — 80048 BASIC METABOLIC PNL TOTAL CA: CPT

## 2021-09-02 PROCEDURE — 85025 COMPLETE CBC W/AUTO DIFF WBC: CPT

## 2021-09-07 ENCOUNTER — OFFICE VISIT (OUTPATIENT)
Dept: FAMILY MEDICINE CLINIC | Age: 71
End: 2021-09-07

## 2021-09-07 VITALS
RESPIRATION RATE: 16 BRPM | DIASTOLIC BLOOD PRESSURE: 60 MMHG | HEART RATE: 70 BPM | TEMPERATURE: 96.8 F | WEIGHT: 180.5 LBS | SYSTOLIC BLOOD PRESSURE: 122 MMHG | HEIGHT: 62 IN | BODY MASS INDEX: 33.21 KG/M2

## 2021-09-07 DIAGNOSIS — Z01.818 PRE-OP EVALUATION: Primary | ICD-10-CM

## 2021-09-07 DIAGNOSIS — I10 ESSENTIAL HYPERTENSION: ICD-10-CM

## 2021-09-07 PROBLEM — H65.90 MEE (MIDDLE EAR EFFUSION): Status: ACTIVE | Noted: 2021-09-07

## 2021-09-07 PROBLEM — H66.91 CHRONIC OTITIS MEDIA OF RIGHT EAR: Status: ACTIVE | Noted: 2021-09-07

## 2021-09-07 PROCEDURE — G8427 DOCREV CUR MEDS BY ELIG CLIN: HCPCS | Performed by: FAMILY MEDICINE

## 2021-09-07 PROCEDURE — 1090F PRES/ABSN URINE INCON ASSESS: CPT | Performed by: FAMILY MEDICINE

## 2021-09-07 PROCEDURE — G8417 CALC BMI ABV UP PARAM F/U: HCPCS | Performed by: FAMILY MEDICINE

## 2021-09-07 PROCEDURE — 99214 OFFICE O/P EST MOD 30 MIN: CPT | Performed by: FAMILY MEDICINE

## 2021-09-07 ASSESSMENT — ENCOUNTER SYMPTOMS
SHORTNESS OF BREATH: 0
SINUS PRESSURE: 0
CONSTIPATION: 0

## 2021-09-07 NOTE — PROGRESS NOTES
Anna Borjas (:  1950) is a 78 y.o. female,Established patient, here for evaluation of the following chief complaint(s):  Pre-op Exam         ASSESSMENT/PLAN:      Diagnosis Orders   1. Pre-op evaluation     2. Essential hypertension         She is medically cleared for surgery  See me in December still    Subjective   SUBJECTIVE/OBJECTIVE:  HPI  1. We discussed the upcoming ENT surgery  2. She denies personal or FH of DVT, PE of anesthesia problems  3. The pre op EKG had PVC and PAC. She can climb a flight of stairs fine  4. The CBC, BMP were fine  Review of Systems   Constitutional: Negative for chills, fatigue and fever. HENT: Negative for sinus pressure. Eyes: Negative for visual disturbance. Respiratory: Negative for shortness of breath. Cardiovascular: Negative for chest pain. Gastrointestinal: Negative for constipation. Genitourinary: Negative. Musculoskeletal: Positive for arthralgias. Skin: Negative for rash. Neurological: Negative for headaches. The patient's medications, allergies, past medical problems, surgical, social, and family histories were reviewed and updated as needed. Objective   Physical Exam  Constitutional:       General: She is not in acute distress. Appearance: She is well-developed. HENT:      Head: Normocephalic and atraumatic. Right Ear: External ear normal.      Left Ear: External ear normal.      Nose: Nose normal.      Mouth/Throat:      Pharynx: No oropharyngeal exudate. Eyes:      General: No scleral icterus. Conjunctiva/sclera: Conjunctivae normal.   Neck:      Thyroid: No thyromegaly. Vascular: No carotid bruit. Trachea: No tracheal deviation. Cardiovascular:      Rate and Rhythm: Normal rate and regular rhythm. Heart sounds: Normal heart sounds. Pulmonary:      Effort: Pulmonary effort is normal.      Breath sounds: Normal breath sounds.    Abdominal:      General: Bowel sounds are normal. Palpations: Abdomen is soft. There is no mass. Musculoskeletal:      Cervical back: Neck supple. Lymphadenopathy:      Cervical: No cervical adenopathy. Skin:     General: Skin is warm and dry. Neurological:      Mental Status: She is alert and oriented to person, place, and time. Psychiatric:         Behavior: Behavior normal.     Blood pressure 122/60, pulse 70, temperature 96.8 °F (36 °C), temperature source Skin, resp. rate 16, height 5' 2\" (1.575 m), weight 180 lb 8 oz (81.9 kg), not currently breastfeeding. An electronic signature was used to authenticate this note.     --Joshua Murphy MD

## 2021-09-20 ENCOUNTER — TELEPHONE (OUTPATIENT)
Dept: FAMILY MEDICINE CLINIC | Age: 71
End: 2021-09-20

## 2021-11-26 ENCOUNTER — TELEPHONE (OUTPATIENT)
Dept: PHYSICAL MEDICINE AND REHAB | Age: 71
End: 2021-11-26

## 2021-11-26 ENCOUNTER — HOSPITAL ENCOUNTER (OUTPATIENT)
Age: 71
Discharge: HOME | End: 2021-11-26
Payer: MEDICARE

## 2021-11-26 LAB
CREAT SERPL-MCNC: 0.6 MG/DL (ref 0.4–1.2)
GFR SERPL CREATININE-BSD FRML MDRD: > 90 ML/MIN/1.73M2

## 2021-11-26 PROCEDURE — 36415 COLL VENOUS BLD VENIPUNCTURE: CPT

## 2021-11-26 PROCEDURE — 82565 ASSAY OF CREATININE: CPT

## 2021-11-26 NOTE — TELEPHONE ENCOUNTER
Pt. Contacted regarding scheduling. Having an Brain MRI on 11/29/2021. Will call back with results and possibly scheduling procedure.

## 2021-11-29 ENCOUNTER — HOSPITAL ENCOUNTER (OUTPATIENT)
Dept: MRI IMAGING | Age: 71
Discharge: HOME | End: 2021-11-29
Payer: MEDICARE

## 2021-11-29 DIAGNOSIS — H91.8X9 ASYMMETRICAL HEARING LOSS: ICD-10-CM

## 2021-11-29 PROCEDURE — 6360000004 HC RX CONTRAST MEDICATION: Performed by: PHYSICIAN ASSISTANT

## 2021-11-29 PROCEDURE — A9579 GAD-BASE MR CONTRAST NOS,1ML: HCPCS | Performed by: PHYSICIAN ASSISTANT

## 2021-11-29 PROCEDURE — 70553 MRI BRAIN STEM W/O & W/DYE: CPT

## 2021-11-29 RX ADMIN — GADOTERIDOL 15 ML: 279.3 INJECTION, SOLUTION INTRAVENOUS at 10:42

## 2021-12-08 ENCOUNTER — TELEPHONE (OUTPATIENT)
Dept: PHYSICAL MEDICINE AND REHAB | Age: 71
End: 2021-12-08

## 2021-12-08 NOTE — TELEPHONE ENCOUNTER
Pt called office regarding procedure scheduling. Had a recent MRI due to hearing loss and they were concerned with a tumor. MRI results:  1. No evidence of an acoustic tumor. There are mild inflammatory changes in the right mastoid air cells. 2. Probable ischemic changes in the white matter and left basal ganglia. No evidence for an acute infarct. 3. Small cystic areas in the medial temporal lobes bilaterally left greater than right. 4. Small venous anomaly in the right posterior frontal cortex extending to the periventricular white matter. 5. Otherwise negative MRI scan of the brain with and without intravenous contrast..     Pt. Has not been seen since 8/3/2021 but symptoms remain the same. OK to schedule  Lumbar RFA Bilateral if able L3-4, 4-5 or does she need to be seen?   Please advise, Thanks

## 2021-12-09 ENCOUNTER — TELEPHONE (OUTPATIENT)
Dept: FAMILY MEDICINE CLINIC | Age: 71
End: 2021-12-09

## 2021-12-09 DIAGNOSIS — I10 ESSENTIAL HYPERTENSION: ICD-10-CM

## 2021-12-09 RX ORDER — AMLODIPINE BESYLATE 2.5 MG/1
2.5 TABLET ORAL DAILY
Qty: 90 TABLET | Refills: 0 | Status: SHIPPED | OUTPATIENT
Start: 2021-12-09 | End: 2022-12-09

## 2021-12-09 NOTE — TELEPHONE ENCOUNTER
Date of last visit:  9/7/2021  Date of next visit:  12/27/2021    Requested Prescriptions     Signed Prescriptions Disp Refills    amLODIPine (NORVASC) 2.5 MG tablet 90 tablet 0     Sig: Take 1 tablet by mouth daily     Authorizing Provider: Petra Antony     Ordering User: Porter Nguyen

## 2021-12-27 ENCOUNTER — TELEPHONE (OUTPATIENT)
Dept: FAMILY MEDICINE CLINIC | Age: 71
End: 2021-12-27

## 2021-12-27 ENCOUNTER — OFFICE VISIT (OUTPATIENT)
Dept: FAMILY MEDICINE CLINIC | Age: 71
End: 2021-12-27

## 2021-12-27 VITALS
HEIGHT: 62 IN | TEMPERATURE: 96 F | RESPIRATION RATE: 14 BRPM | WEIGHT: 183 LBS | DIASTOLIC BLOOD PRESSURE: 70 MMHG | SYSTOLIC BLOOD PRESSURE: 118 MMHG | HEART RATE: 70 BPM | BODY MASS INDEX: 33.68 KG/M2

## 2021-12-27 DIAGNOSIS — I10 ESSENTIAL HYPERTENSION: Primary | ICD-10-CM

## 2021-12-27 PROCEDURE — 99214 OFFICE O/P EST MOD 30 MIN: CPT | Performed by: FAMILY MEDICINE

## 2021-12-27 PROCEDURE — 3017F COLORECTAL CA SCREEN DOC REV: CPT | Performed by: FAMILY MEDICINE

## 2021-12-27 PROCEDURE — G8417 CALC BMI ABV UP PARAM F/U: HCPCS | Performed by: FAMILY MEDICINE

## 2021-12-27 PROCEDURE — 1123F ACP DISCUSS/DSCN MKR DOCD: CPT | Performed by: FAMILY MEDICINE

## 2021-12-27 PROCEDURE — G8399 PT W/DXA RESULTS DOCUMENT: HCPCS | Performed by: FAMILY MEDICINE

## 2021-12-27 PROCEDURE — G8427 DOCREV CUR MEDS BY ELIG CLIN: HCPCS | Performed by: FAMILY MEDICINE

## 2021-12-27 PROCEDURE — 4040F PNEUMOC VAC/ADMIN/RCVD: CPT | Performed by: FAMILY MEDICINE

## 2021-12-27 PROCEDURE — 1036F TOBACCO NON-USER: CPT | Performed by: FAMILY MEDICINE

## 2021-12-27 PROCEDURE — 1090F PRES/ABSN URINE INCON ASSESS: CPT | Performed by: FAMILY MEDICINE

## 2021-12-27 ASSESSMENT — ENCOUNTER SYMPTOMS
SINUS PRESSURE: 0
SHORTNESS OF BREATH: 0
CONSTIPATION: 0

## 2021-12-27 NOTE — TELEPHONE ENCOUNTER
----- Message from Fred Camarena MD sent at 12/27/2021 10:36 AM EST -----  She feels she had some labs at [facility] earlier this month

## 2021-12-27 NOTE — PROGRESS NOTES
Shireen Davis (:  1950) is a 73 y.o. female,Established patient, here for evaluation of the following chief complaint(s):  Hypertension         ASSESSMENT/PLAN:   Diagnosis Orders   1. Essential hypertension            See me in 6 months    Subjective   SUBJECTIVE/OBJECTIVE:  HPI  1. She is working with pain management  Review of Systems   Constitutional: Negative for fatigue. HENT: Negative for sinus pressure. Eyes: Negative for visual disturbance. Respiratory: Negative for shortness of breath. Cardiovascular: Negative for chest pain. Gastrointestinal: Negative for constipation. Genitourinary: Negative. Musculoskeletal: Negative for arthralgias. Skin: Negative for rash. Neurological: Negative for headaches. The patient's medications, allergies, past medical problems, surgical, social, and family histories were reviewed and updated as needed. Objective   Physical Exam  Constitutional:       General: She is not in acute distress. Appearance: She is well-developed. HENT:      Head: Normocephalic and atraumatic. Right Ear: External ear normal.      Left Ear: External ear normal.      Nose: Nose normal.      Mouth/Throat:      Pharynx: No oropharyngeal exudate. Eyes:      General: No scleral icterus. Conjunctiva/sclera: Conjunctivae normal.   Neck:      Thyroid: No thyromegaly. Vascular: No carotid bruit. Trachea: No tracheal deviation. Cardiovascular:      Rate and Rhythm: Normal rate and regular rhythm. Heart sounds: Normal heart sounds. Pulmonary:      Effort: Pulmonary effort is normal.      Breath sounds: Normal breath sounds. Abdominal:      General: Bowel sounds are normal.      Palpations: Abdomen is soft. There is no mass. Musculoskeletal:      Cervical back: Neck supple. Lymphadenopathy:      Cervical: No cervical adenopathy. Skin:     General: Skin is warm and dry.    Neurological:      Mental Status: She is alert and oriented to person, place, and time. Psychiatric:         Behavior: Behavior normal.     Blood pressure 118/70, pulse 70, temperature 96 °F (35.6 °C), temperature source Skin, resp. rate 14, height 5' 2\" (1.575 m), weight 183 lb (83 kg), not currently breastfeeding. An electronic signature was used to authenticate this note.     --Elias Martell MD

## 2022-01-18 ENCOUNTER — PREP FOR PROCEDURE (OUTPATIENT)
Dept: PHYSICAL MEDICINE AND REHAB | Age: 72
End: 2022-01-18

## 2022-01-27 ENCOUNTER — ANESTHESIA (OUTPATIENT)
Dept: OPERATING ROOM | Age: 72
End: 2022-01-27
Payer: MEDICARE

## 2022-01-27 ENCOUNTER — APPOINTMENT (OUTPATIENT)
Dept: GENERAL RADIOLOGY | Age: 72
End: 2022-01-27
Attending: PAIN MEDICINE
Payer: MEDICARE

## 2022-01-27 ENCOUNTER — HOSPITAL ENCOUNTER (OUTPATIENT)
Age: 72
Setting detail: OUTPATIENT SURGERY
Discharge: HOME | End: 2022-01-27
Attending: PAIN MEDICINE | Admitting: PAIN MEDICINE
Payer: MEDICARE

## 2022-01-27 ENCOUNTER — ANESTHESIA EVENT (OUTPATIENT)
Dept: OPERATING ROOM | Age: 72
End: 2022-01-27
Payer: MEDICARE

## 2022-01-27 VITALS
HEART RATE: 74 BPM | BODY MASS INDEX: 33.23 KG/M2 | WEIGHT: 180.6 LBS | RESPIRATION RATE: 16 BRPM | SYSTOLIC BLOOD PRESSURE: 95 MMHG | HEIGHT: 62 IN | TEMPERATURE: 97.3 F | OXYGEN SATURATION: 93 % | DIASTOLIC BLOOD PRESSURE: 51 MMHG

## 2022-01-27 VITALS
DIASTOLIC BLOOD PRESSURE: 66 MMHG | SYSTOLIC BLOOD PRESSURE: 135 MMHG | OXYGEN SATURATION: 97 % | RESPIRATION RATE: 11 BRPM

## 2022-01-27 PROCEDURE — 2500000003 HC RX 250 WO HCPCS: Performed by: PAIN MEDICINE

## 2022-01-27 PROCEDURE — 3209999900 FLUORO FOR SURGICAL PROCEDURES

## 2022-01-27 PROCEDURE — 64636 DESTROY L/S FACET JNT ADDL: CPT | Performed by: PAIN MEDICINE

## 2022-01-27 PROCEDURE — 64635 DESTROY LUMB/SAC FACET JNT: CPT | Performed by: PAIN MEDICINE

## 2022-01-27 PROCEDURE — 6360000002 HC RX W HCPCS: Performed by: NURSE ANESTHETIST, CERTIFIED REGISTERED

## 2022-01-27 PROCEDURE — 2500000003 HC RX 250 WO HCPCS: Performed by: NURSE ANESTHETIST, CERTIFIED REGISTERED

## 2022-01-27 PROCEDURE — 3600000056 HC PAIN LEVEL 4 BASE: Performed by: PAIN MEDICINE

## 2022-01-27 PROCEDURE — 6360000002 HC RX W HCPCS: Performed by: PAIN MEDICINE

## 2022-01-27 PROCEDURE — 2709999900 HC NON-CHARGEABLE SUPPLY: Performed by: PAIN MEDICINE

## 2022-01-27 PROCEDURE — 7100000011 HC PHASE II RECOVERY - ADDTL 15 MIN: Performed by: PAIN MEDICINE

## 2022-01-27 PROCEDURE — 3700000000 HC ANESTHESIA ATTENDED CARE: Performed by: PAIN MEDICINE

## 2022-01-27 PROCEDURE — 3600000057 HC PAIN LEVEL 4 ADDL 15 MIN: Performed by: PAIN MEDICINE

## 2022-01-27 PROCEDURE — 3700000001 HC ADD 15 MINUTES (ANESTHESIA): Performed by: PAIN MEDICINE

## 2022-01-27 PROCEDURE — 7100000010 HC PHASE II RECOVERY - FIRST 15 MIN: Performed by: PAIN MEDICINE

## 2022-01-27 RX ORDER — FENTANYL CITRATE 50 UG/ML
INJECTION, SOLUTION INTRAMUSCULAR; INTRAVENOUS PRN
Status: DISCONTINUED | OUTPATIENT
Start: 2022-01-27 | End: 2022-01-27 | Stop reason: SDUPTHER

## 2022-01-27 RX ORDER — LIDOCAINE HYDROCHLORIDE 10 MG/ML
INJECTION, SOLUTION EPIDURAL; INFILTRATION; INTRACAUDAL; PERINEURAL PRN
Status: DISCONTINUED | OUTPATIENT
Start: 2022-01-27 | End: 2022-01-27 | Stop reason: ALTCHOICE

## 2022-01-27 RX ORDER — LIDOCAINE HYDROCHLORIDE 20 MG/ML
INJECTION, SOLUTION EPIDURAL; INFILTRATION; INTRACAUDAL; PERINEURAL PRN
Status: DISCONTINUED | OUTPATIENT
Start: 2022-01-27 | End: 2022-01-27 | Stop reason: SDUPTHER

## 2022-01-27 RX ORDER — METHYLPREDNISOLONE ACETATE 80 MG/ML
INJECTION, SUSPENSION INTRA-ARTICULAR; INTRALESIONAL; INTRAMUSCULAR; SOFT TISSUE PRN
Status: DISCONTINUED | OUTPATIENT
Start: 2022-01-27 | End: 2022-01-27 | Stop reason: ALTCHOICE

## 2022-01-27 RX ORDER — PROPOFOL 10 MG/ML
INJECTION, EMULSION INTRAVENOUS PRN
Status: DISCONTINUED | OUTPATIENT
Start: 2022-01-27 | End: 2022-01-27 | Stop reason: SDUPTHER

## 2022-01-27 RX ORDER — BUPIVACAINE HYDROCHLORIDE 2.5 MG/ML
INJECTION, SOLUTION EPIDURAL; INFILTRATION; INTRACAUDAL PRN
Status: DISCONTINUED | OUTPATIENT
Start: 2022-01-27 | End: 2022-01-27 | Stop reason: ALTCHOICE

## 2022-01-27 RX ADMIN — PROPOFOL 40 MG: 10 INJECTION, EMULSION INTRAVENOUS at 08:22

## 2022-01-27 RX ADMIN — PROPOFOL 80 MG: 10 INJECTION, EMULSION INTRAVENOUS at 08:19

## 2022-01-27 RX ADMIN — LIDOCAINE HYDROCHLORIDE 100 MG: 20 INJECTION, SOLUTION EPIDURAL; INFILTRATION; INTRACAUDAL; PERINEURAL at 08:19

## 2022-01-27 RX ADMIN — FENTANYL CITRATE 100 MCG: 50 INJECTION, SOLUTION INTRAMUSCULAR; INTRAVENOUS at 08:02

## 2022-01-27 RX ADMIN — PROPOFOL 20 MG: 10 INJECTION, EMULSION INTRAVENOUS at 08:20

## 2022-01-27 ASSESSMENT — PULMONARY FUNCTION TESTS
PIF_VALUE: 1
PIF_VALUE: 2
PIF_VALUE: 2
PIF_VALUE: 1

## 2022-01-27 ASSESSMENT — PAIN SCALES - GENERAL: PAINLEVEL_OUTOF10: 0

## 2022-01-27 ASSESSMENT — PAIN - FUNCTIONAL ASSESSMENT: PAIN_FUNCTIONAL_ASSESSMENT: 0-10

## 2022-01-27 NOTE — ANESTHESIA POSTPROCEDURE EVALUATION
Department of Anesthesiology  Postprocedure Note    Patient: Ritchie Ramos  MRN: [id number]  YOB: 1950  Date of evaluation: 1/27/2022  Time:  8:35 AM     Procedure Summary     Date: 01/27/22 Room / Location: [facility] OR 03 / [facility]    Anesthesia Start: 0800 Anesthesia Stop: 0825    Procedure: Lumbar RFA Bilateral  L3-4, 4-5 (Bilateral ) Diagnosis: (LUMBAR SPONDYLOSIS)    Surgeons: Ronnell Miguel MD Responsible Provider: Stella Concepcion DO    Anesthesia Type: MAC ASA Status: 2          Anesthesia Type: MAC    Eloy Phase I:      Eloy Phase II: Eloy Score: 10    Last vitals: Reviewed and per EMR flowsheets.        Anesthesia Post Evaluation    Patient location during evaluation: bedside  Patient participation: complete - patient participated  Level of consciousness: awake  Airway patency: patent  Nausea & Vomiting: no vomiting and no nausea  Cardiovascular status: hemodynamically stable  Respiratory status: acceptable  Hydration status: stable

## 2022-01-27 NOTE — ANESTHESIA PRE PROCEDURE
Department of Anesthesiology  Preprocedure Note       Name:  Bindu Cohen   Age:  73 y.o.  :  1950                                          MRN:  [id number]         Date:  2022      Surgeon: Brenton Lowery):  Aleida Caceres MD    Procedure: Procedure(s):  Lumbar RFA Bilateral  L3-4, 4-5    Medications prior to admission:   Prior to Admission medications    Medication Sig Start Date End Date Taking? Authorizing Provider   pantoprazole (PROTONIX) 40 MG tablet TAKE 1 TABLET BY MOUTH EVERY DAY 22  Yes Jessica Zarate MD   amLODIPine (NORVASC) 2.5 MG tablet Take 1 tablet by mouth daily 21 Yes Jessica Zarate MD   ferrous sulfate (IRON 325) 325 (65 Fe) MG tablet Take 325 mg by mouth daily (with breakfast)   Yes Historical Provider, MD   vitamin B-12 (CYANOCOBALAMIN) 1000 MCG tablet Take 1,000 mcg by mouth daily   Yes Historical Provider, MD   simvastatin (ZOCOR) 20 MG tablet TAKE 1 TABLET BY MOUTH EVERY DAY 20  Yes Jessica Zarate MD   lisinopril (PRINIVIL;ZESTRIL) 20 MG tablet TAKE 1 TABLET BY MOUTH DAILY SHE NEEDS SEEN FOR MORE 10/26/20  Yes Jessica Zarate MD   ibuprofen (ADVIL;MOTRIN) 200 MG tablet Take 200 mg by mouth every 6 hours as needed for Pain   Yes Historical Provider, MD   Cholecalciferol (VITAMIN D3) 5000 UNITS CAPS Take 1 capsule by mouth daily. Yes Jessica Zarate MD   aspirin 81 MG tablet Take 81 mg by mouth daily. Yes Historical Provider, MD   calcium carbonate 600 MG TABS tablet Take 1 tablet by mouth 2 times daily. Yes Historical Provider, MD   acetaminophen (AMINOFEN) 325 MG tablet Take 2 tablets by mouth every 6 hours as needed for Pain 21   Yue Mercury, APRN - CNP   raloxifene (EVISTA) 60 MG tablet Take 1 tablet by mouth daily 21   Jessica Zarate MD       Current medications:    No current facility-administered medications for this encounter. Allergies:     Allergies   Allergen Reactions    Codeine Swelling    performed by Maryan Coates MD at [address] SURGERY  2008    left trigger finger    LUMBAR SPINE SURGERY Right 5/20/2019    LUMBAR RFA, L2-3, L3-4, L4-5, RIGHT SIDE performed by aMryan Coates MD at [address] Left 6/17/2019    LUMBAR RFA L2-3, L3-4, L4-5, LEFT SIDE performed by Maryan Coates MD at [address] Right 12/2/2019    Lumbar RFA L2-3,L3-4 and L4-5 right performed by Maryan Coates MD at [place] [address] FACET LEVEL 1 BILATERAL Bilateral 2/4/2019    LUMBAR FACET MBB @ L2-3,  L3-4,  L4-5   BILATERAL performed by Maryan Coates MD at [place] [address] FACET LEVEL 1 BILATERAL Bilateral 3/25/2019    LUMBAR FACET MBB @ L2-3,  L3-4,  L4-5   BILATERAL performed by Maryan Coates MD at [place] [address] Left 6/15/2020    Lumbar RFA  L2-3,L3-4 and L4-5 left, performed by Maryan Coates MD at [place] [address] Right 7/31/2020    Lumbar RFA L2-3,L3-4 and L4-5 right performed by Maryan Coates MD at [facility] N/A 10/29/2020    LESI L4 performed by Maryan Coates MD at [facility] Left 12/18/2020    Lumbar RFA L2-3, L3-4 and L4-5 left after 12/15/2020 performed by Maryan Coates MD at [facility] Right 2/8/2021    Lumbar RFA Right side  L2-3, 3-4, 4-5 performed by Maryan Coates MD at [address] [facility]  10/2013       Social History:    Social History     Tobacco Use    Smoking status: Never Smoker    Smokeless tobacco: Never Used   Substance Use Topics    Alcohol use:  No                                Counseling given: Not Answered      Vital Signs (Current): Vitals:    01/27/22 0655   BP: 124/65   Pulse: 73   Resp: 16   Temp: 96 °F (35.6 °C)   TempSrc: Temporal   SpO2: 97%   Weight: 180 lb 9.6 oz (81.9 kg)   Height: 5' 2\" (1.575 m)                                              BP Readings from Last 3 Encounters:   01/27/22 124/65   12/27/21 118/70   09/07/21 122/60       NPO Status: Time of last liquid consumption: 0515 (SIP OF WATER)                        Time of last solid consumption: 0730                        Date of last liquid consumption: 01/27/22                        Date of last solid food consumption: 01/26/22    BMI:   Wt Readings from Last 3 Encounters:   01/27/22 180 lb 9.6 oz (81.9 kg)   12/27/21 183 lb (83 kg)   09/07/21 180 lb 8 oz (81.9 kg)     Body mass index is 33.03 kg/m². CBC:   Lab Results   Component Value Date    WBC 6.9 09/02/2021    RBC 4.41 09/02/2021    HGB 13.6 09/02/2021    HCT 43.3 09/02/2021    MCV 98.2 09/02/2021    RDW 14.1 08/30/2019     09/02/2021       CMP:   Lab Results   Component Value Date     09/02/2021    K 4.7 09/02/2021     09/02/2021    CO2 25 09/02/2021    BUN 29 09/02/2021    CREATININE 0.6 11/26/2021    AGRATIO 1.7 08/30/2019    LABGLOM >90 11/26/2021    GLUCOSE 108 09/02/2021    GLUCOSE 112 08/30/2019    PROT 7.6 06/09/2021    CALCIUM 9.8 09/02/2021    BILITOT 0.5 06/09/2021    ALKPHOS 92 06/09/2021    AST 23 06/09/2021    ALT 27 06/09/2021       POC Tests: No results for input(s): POCGLU, POCNA, POCK, POCCL, POCBUN, POCHEMO, POCHCT in the last 72 hours.     Coags:   Lab Results   Component Value Date    PROTIME 12.0 12/14/2018    INR 1.04 12/14/2018       HCG (If Applicable): No results found for: PREGTESTUR, PREGSERUM, HCG, HCGQUANT     ABGs: No results found for: PHART, PO2ART, LOJ8MXQ, FSF5GDE, BEART, U3JCGEBS     Type & Screen (If Applicable):  No results found for: LABABO, LABRH    Drug/Infectious Status (If Applicable):  No results found for: HIV, HEPCAB    COVID-19 Screening (If Applicable):   Lab Results   Component Value Date    COVID19 Not Detected 07/27/2020           Anesthesia Evaluation  Patient summary reviewed  Airway: Mallampati: III  TM distance: >3 FB   Neck ROM: full  Mouth opening: > = 3 FB Dental:          Pulmonary:                              Cardiovascular:    (+) hypertension:,       ECG reviewed                        Neuro/Psych:   (+) neuromuscular disease:, psychiatric history:            GI/Hepatic/Renal:   (+) hiatal hernia, GERD:,           Endo/Other:                     Abdominal:             Vascular: Other Findings:             Anesthesia Plan      MAC     ASA 2       Induction: intravenous. Anesthetic plan and risks discussed with patient. Plan discussed with JEROMY. Evelin Anaya.  [address] [place] [address]   1/27/2022 No

## 2022-01-27 NOTE — PROGRESS NOTES
8290-  Patient arrived to phase II via cart. Spontaneous respiraitons even and unlabored. Placed on monitor--VSS. Report received from [facility].    0079-  Assessment completed. Patient is alert and oriented x4. IV capped off-- no complications. Patient denies pain--will monitor. Injection sites clean and dry. 0830-  Snack and drink given to patient. Family in car. 5250-  All belongings in room. 0840-  IV removed-- no complications. Bandage applied. 3117-  Patient discharged in stable condition with all belongings. This RN walked patient to car.

## 2022-01-27 NOTE — OP NOTE
Pre-Procedure Note    Patient Name: Mari April   Date of [address] Record Number: [id number]  Date: 1/27/22    Indication:  Lower back pain  Consent: On file. Vital Signs:   Vitals:    01/27/22 0655   BP: 124/65   Pulse: 73   Resp: 16   Temp: 96 °F (35.6 °C)   SpO2: 97%       Past Medical History:   has a past medical history of Adenomatous colon polyp, Chronic gastritis, Coronary artery spasm (HCC), GERD (gastroesophageal reflux disease), Hiatal hernia, Hyperlipidemia, Hypertension, MVP (mitral valve prolapse), Obesity, Class II, BMI 35-39.9, with comorbidity, Osteopenia, Scoliosis (and kyphoscoliosis), idiopathic, and Vitamin D deficiency. Past Surgical History:   has a past surgical history that includes Carpal tunnel release (2007); Hand surgery (2008); Upper gastrointestinal endoscopy (10/2013); Cardiac catheterization (2008); Cardiac catheterization (12/18/2018); Endoscopy, colon, diagnostic (01/27/2019); Nerve Block Lumb Facet Level 1 Bilateral (Bilateral, 2/4/2019); Nerve Block Lumb Facet Level 1 Bilateral (Bilateral, 3/25/2019); Lumbar spine surgery (Right, 5/20/2019); Lumbar spine surgery (Left, 6/17/2019); epidural steroid injection (Bilateral, 8/1/2019); epidural steroid injection (Bilateral, 9/19/2019); Lumbar spine surgery (Right, 12/2/2019); Pain management procedure (Left, 6/15/2020); Pain management procedure (Right, 7/31/2020); Pain management procedure (N/A, 10/29/2020); Pain management procedure (Left, 12/18/2020); and Pain management procedure (Right, 2/8/2021). Pre-Sedation Documentation and Exam:   Vital signs have been reviewed (see flow sheet for vitals).      Sedation/ Anesthesia Plan:   MAC    Patient is an appropriate candidate for plan of sedation: yes         Preoperative Diagnosis:  L-spondylosis     Post-Op Dx: as above     Procedure Performed:  :Radiofrequency ablation of median branches at the levels of L3-4 and L4-5   under fluoroscopic guidance    Indication for the Procedure:  The patient has ahistory of chronic low back pain that is not responding well to the conservative treatment.  Patient's pain is mostly axial in nature.  Pain is interfering with the activities of daily living.  Physical examination revealed facet tenderness and facet loading is positive.  Patient had undergone lumbar facet joint injections with pain relief that lasted for only a short period of time and had greater than 70% pain relief with confirmatory median branch blocks.  Hence we decided to do radiofrequency abalation of median branches for long term pain releif.   The procedure and risks  were discussed with the patient and an informed consent was obtained.     Procedure:     A meaningful communication was kept up with the patient throughout the procedure. The patient is placed in prone position and skin over the back was prepped and draped in sterile manner.  Then using fluoroscopy the junction of the transverse process of the vertebra with the superior process of the facet joint was observed and the view was optimized.  The skin and deep tissues posterior were infiltrated with 20 ml of  1% xylocaine. The RF canula with the 10 mm active tip was introduced through the skin wheal under fluoroscopy guidance such that the tip of the needle lies in the groove of the transverse process with the superior processes of the facet joint.  Then a lateral view of the lumbar spine was obtained to make sure the tip of needle is not in the neural foramen.  Then electric impedence was checked to make sure it is acceptable. Then a sensory stimulus was applied at 50 Hz up to 1 volt and concordant pain symptoms were reproduced.  Then a motor stimulus was applied at 2 Hz up to 2 volts and no motor stimulation was seen in lower extremities.  Some multifidus stimulus was seen. Ant Encinas after negative aspiration a mixture of depomedrol 80 mg  and 0.25%  marcaine 2 cc  was injected through the needle in divided doses.  Then a  lesion was done at 80 degrees centigrade for 90 seconds.     EBL-0     Patient's vital signs and neurological status remained stable throughout the procedure and post procedural period.  The patient tolerated the procedure well and was discharged home in stable condition.     Electronically signed by Nadir Beck MD on 1/27/22 at 8:03 AM EST

## 2022-01-27 NOTE — H&P
[address] [place] [address]  History and Physical Update    Pt Name: Krystal Aviles  MRN: [id number]  YOB: 1950  Date of evaluation: 1/27/2022      I have examined the patient and reviewed the H&P/Consult and there are no changes to the patient or plans.         Electronically signed by Linda Louis MD on 1/27/2022 at 8:00 AM

## 2022-01-27 NOTE — H&P
H&P    Continues to have low back pain her Lumbar RFAs have worn off. She takes Tylenol and Motrin. She had Lumbar RFA Right side L2-3.3-4.4-5 2/8/2020 states 70% pain relief for 6 months. . She had Left lumbar RFA  L2-5 12/18/2020 with 50% pain relief for 8 months. Deepika Hernandez low back pain is all axial. She has had LESI  10/2020 with 60% pain relief for 3-4 months  Pain increases with bending, lifting, twisting , turning torso, reaching, pushing, pulling, walking, standing, stairs and getting up and down. Treatments Tried PT, ice, heat, Chiropractor, NSAIDS, muscle relaxer, OTC rubs creams patches, steroid burst and injections  Pain Description sharp, shooting, stabbing, burning and aching      She denies any ER visits since last visit.     Pain scale with out pain medications or at its worst is 8/10. Pain scale with pain medications or at its best is 2/10.        The patientis allergic to codeine, neomycin, and pcn [penicillins].       Subjective:      Review of Systems   Constitutional: Positive for activity change. Negative for chills, diaphoresis, fatigue and fever. Cardiovascular:        CAD  MVP   Genitourinary: Negative for difficulty urinating, frequency and urgency. Musculoskeletal: Positive for arthralgias, back pain, gait problem and myalgias. Negative for neck pain and neck stiffness. Skin: Negative for color change, rash and wound. Allergic/Immunologic: Negative for environmental allergies and food allergies. Neurological: Negative for dizziness, seizures, light-headedness, numbness and headaches. Hematological: Does not bruise/bleed easily. Psychiatric/Behavioral: Negative for sleep disturbance. The patient is not nervous/anxious.          Objective:      Vitals       Vitals:     08/03/21 1033   BP: 128/72   Site: Left Upper Arm   Position: Sitting   Cuff Size: Medium Adult   Pulse: 70   Weight: 181 lb (82.1 kg)   Height: 5' 3\" (1.6 m)            Physical Exam  Vitals reviewed. understanding with this and that may not be pain free  · Discussed with patient about safe storage of medications at home  · Discussed possible weaning of medication dosing dependent on treatment/procedure results. · Testing: none  · Procedures: Repeat Lumbar RFA Bilateral if able  L3-4,4-5 otherwise left side first  · Discussed with patient about risks with procedure including infection, reaction to medication, increased pain, or bleeding. · Medications:Has Tramadol at home doesn't work has tried [employer] work.  Trial Zanaflex medication reviewed  · If patient is on blood thinners will need approval to hold:ASPIRIN                Return for Lumbar RFA Bilateral if able L3-4, 4-5   , Follow up after procedure.

## 2022-04-04 ENCOUNTER — TELEPHONE (OUTPATIENT)
Dept: FAMILY MEDICINE CLINIC | Age: 72
End: 2022-04-04

## 2022-04-04 NOTE — TELEPHONE ENCOUNTER
Pt called requesting your input on a couple meds she is thinking of taking for memory issues.   The options that she has found are:    Chavo product is [employer]    Faby Pear - product is - [employer]    Pt can be reached at [phone]

## 2022-04-25 ENCOUNTER — OFFICE VISIT (OUTPATIENT)
Dept: PHYSICAL MEDICINE AND REHAB | Age: 72
End: 2022-04-25
Payer: MEDICARE

## 2022-04-25 ENCOUNTER — TELEPHONE (OUTPATIENT)
Dept: PHYSICAL MEDICINE AND REHAB | Age: 72
End: 2022-04-25

## 2022-04-25 VITALS
DIASTOLIC BLOOD PRESSURE: 70 MMHG | HEART RATE: 68 BPM | WEIGHT: 181 LBS | SYSTOLIC BLOOD PRESSURE: 124 MMHG | BODY MASS INDEX: 33.31 KG/M2 | HEIGHT: 62 IN

## 2022-04-25 DIAGNOSIS — M51.36 BULGING LUMBAR DISC: ICD-10-CM

## 2022-04-25 DIAGNOSIS — G89.4 CHRONIC PAIN SYNDROME: ICD-10-CM

## 2022-04-25 DIAGNOSIS — M85.89 OSTEOPENIA OF MULTIPLE SITES: ICD-10-CM

## 2022-04-25 DIAGNOSIS — M47.816 SPONDYLOSIS OF LUMBAR REGION WITHOUT MYELOPATHY OR RADICULOPATHY: ICD-10-CM

## 2022-04-25 DIAGNOSIS — M46.1 SI (SACROILIAC) JOINT INFLAMMATION (HCC): ICD-10-CM

## 2022-04-25 DIAGNOSIS — M53.3 SACROILIAC JOINT DYSFUNCTION: Primary | ICD-10-CM

## 2022-04-25 DIAGNOSIS — M54.50 LUMBAR PAIN: ICD-10-CM

## 2022-04-25 DIAGNOSIS — M48.07 LUMBOSACRAL SPINAL STENOSIS: ICD-10-CM

## 2022-04-25 LAB — ANTIBODY: NORMAL

## 2022-04-25 PROCEDURE — 1090F PRES/ABSN URINE INCON ASSESS: CPT | Performed by: NURSE PRACTITIONER

## 2022-04-25 PROCEDURE — G8427 DOCREV CUR MEDS BY ELIG CLIN: HCPCS | Performed by: NURSE PRACTITIONER

## 2022-04-25 PROCEDURE — G8399 PT W/DXA RESULTS DOCUMENT: HCPCS | Performed by: NURSE PRACTITIONER

## 2022-04-25 PROCEDURE — 99214 OFFICE O/P EST MOD 30 MIN: CPT | Performed by: NURSE PRACTITIONER

## 2022-04-25 PROCEDURE — 4040F PNEUMOC VAC/ADMIN/RCVD: CPT | Performed by: NURSE PRACTITIONER

## 2022-04-25 PROCEDURE — 3017F COLORECTAL CA SCREEN DOC REV: CPT | Performed by: NURSE PRACTITIONER

## 2022-04-25 PROCEDURE — G8417 CALC BMI ABV UP PARAM F/U: HCPCS | Performed by: NURSE PRACTITIONER

## 2022-04-25 PROCEDURE — 1123F ACP DISCUSS/DSCN MKR DOCD: CPT | Performed by: NURSE PRACTITIONER

## 2022-04-25 PROCEDURE — 1036F TOBACCO NON-USER: CPT | Performed by: NURSE PRACTITIONER

## 2022-04-25 RX ORDER — TRAMADOL HYDROCHLORIDE 50 MG/1
50 TABLET ORAL EVERY 8 HOURS PRN
Qty: 90 TABLET | Refills: 0 | Status: SHIPPED | OUTPATIENT
Start: 2022-04-25 | End: 2022-05-25

## 2022-04-25 RX ORDER — RALOXIFENE HYDROCHLORIDE 60 MG/1
60 TABLET, FILM COATED ORAL DAILY
Qty: 90 TABLET | Refills: 3 | Status: SHIPPED | OUTPATIENT
Start: 2022-04-25

## 2022-04-25 RX ORDER — GABAPENTIN 300 MG/1
300 CAPSULE ORAL 3 TIMES DAILY
Qty: 90 CAPSULE | Refills: 1 | Status: SHIPPED | OUTPATIENT
Start: 2022-04-25 | End: 2022-08-29 | Stop reason: ALTCHOICE

## 2022-04-25 ASSESSMENT — ENCOUNTER SYMPTOMS
BACK PAIN: 1
COLOR CHANGE: 0

## 2022-04-25 NOTE — TELEPHONE ENCOUNTER
Date of last visit:  12/27/2021  Date of next visit:  7/1/2022    Requested Prescriptions     Pending Prescriptions Disp Refills    raloxifene (EVISTA) 60 MG tablet 90 tablet 3     Sig: Take 1 tablet by mouth daily

## 2022-04-25 NOTE — PROGRESS NOTES
[address] [place] [address]  Dept: [phone]  Dept Fax: [phone]: [phone]    Visit Date: 4/25/2022    Functionality Assessment/Goals Worksheet     On a scale of 0 (Does not Interfere) to 10 (Completely Interferes)     1. Which number describes how during the past week pain has interfered with       the following:  A. General Activity:  9  B. Mood: 6  C. Walking Ability:  9  D. Normal Work (Includes both work outside the home and housework):  9  E. Relations with Other People:   5  F. Sleep:   4  G. Enjoyment of Life:   8    2. Patient Prefers to Take their Pain Medications:     []  On a regular basis   [x]  Only when necessary    []  Does not take pain medications    3. What are the Patient's Goals/Expectations for Visiting Pain Management? []  Learn about my pain    [x]  Receive Medication   []  Physical Therapy     []  Treat Depression   []  Receive Injections    []  Treat Sleep   []  Deal with Anxiety and Stress   []  Treat Opoid Dependence/Addiction   []  Other:      HPI:   Casa Gaming is a 73 y.o. female is here today for    Chief Complaint: Low back pain and SI pain    HPI   F/U Lumbar RFA Bilateral L3-4,4-5  1/27/2022 states 50% pain relief starting to wear off some already. She has f/u with Dr Josefina Vergara prior surgeon was Dr Loc Shen. She continues to have low back bilateral Si pain increases mostly with standing walking. Her main pain complaint is her bilateral SI  area. Pain increases with bending, lifting, twisting , turning torso, reaching, pushing, pulling, walking, standing, stairs and getting up and down.   Treatments Tried PT, ice, heat, Chiropractor, NSAIDS, muscle relaxer, OTC rubs creams patches, steroid burst and injections  Pain Description sharp, shooting, stabbing, burning and aching  She denies any ER visits or new health issues since last visit. Pain scale with out pain medications or at its worst is 9/10. Pain scale with pain medications or at its best is 4/10. LAST UDS  2/2019 WNL      Prior Injections:    She has had LESI  10/2020 with 60% pain relief for 3-4 months      Radiology:  FINDINGS:           There is 5 mm of anterolisthesis of L4 on L5. This is new. There are degenerative marrow changes in the endplates at the F2-1 level. There is associated edema on the right.  There are no compression fractures.  No pars defects are noted.  There is disc    desiccation throughout.       The distal spinal cord, conus medullaris and cauda equina are normal.        There are mild degenerative changes in the lower thoracic spine.       On the axial images, at L1-L2, there are mild facet degenerative changes. There is no spinal canal stenosis. There is mild bilateral foraminal stenosis.       At L2-L3, there are facet degenerative changes. There is thickening of the ligamentum flavum. There is a diffuse disc bulge. There is mild spinal canal stenosis. There is moderate severity right foraminal stenosis. There is mild left foraminal stenosis.       At L3-L4, there are mild facet degenerative changes. There is thickening of ligamentum flavum. There is mild spinal canal stenosis. There is no significant foraminal stenosis.       At L4-L5, there are severe left-sided facet degenerative changes. There are moderate severity right-sided facet degenerative changes. There is a diffuse disc bulge. There is moderate severity spinal canal stenosis. There is moderate to severe left    foraminal stenosis.       At L5-S1, there is no spinal canal stenosis. There is mild left foraminal stenosis.       There are no suspicious findings in the visualized aspects of the retroperitoneum and paraspinal soft tissues.           Impression       1. Moderate to severe left foraminal stenosis with moderate severity spinal canal stenosis at the L4-5 level.  There is also 5 mm of anterolisthesis at this level. 2. Mild spinal canal stenosis at the L3-4 level. 3. Mild spinal canal stenosis with moderate severity right and mild left foraminal stenosis at the L2-3 level.               The patientis allergic to codeine, neomycin, and pcn [penicillins]. Subjective:      Review of Systems   Constitutional: Positive for activity change. Negative for chills, diaphoresis, fatigue and fever. Cardiovascular:        CAD  MVP   Genitourinary: Negative for difficulty urinating, frequency and urgency. Musculoskeletal: Positive for arthralgias, back pain, gait problem and myalgias. Negative for neck pain and neck stiffness. Skin: Negative for color change, rash and wound. Allergic/Immunologic: Negative for environmental allergies and food allergies. Neurological: Negative for dizziness, seizures, light-headedness, numbness and headaches. Hematological: Does not bruise/bleed easily. Psychiatric/Behavioral: Negative for sleep disturbance. The patient is not nervous/anxious. Objective:     Vitals:    04/25/22 0934   BP: 124/70   Site: Left Upper Arm   Position: Sitting   Cuff Size: Large Adult   Pulse: 68   Weight: 181 lb (82.1 kg)   Height: 5' 2\" (1.575 m)       Physical Exam  Vitals reviewed. Constitutional:       General: She is not in acute distress. Appearance: She is well-developed. She is not diaphoretic. HENT:      Head: Normocephalic and atraumatic. Not macrocephalic and not microcephalic. Right Ear: External ear normal.      Left Ear: External ear normal.   Eyes:      General:         Right eye: No discharge. Left eye: No discharge. Conjunctiva/sclera: Conjunctivae normal.   Neck:      Trachea: No tracheal deviation. Pulmonary:      Effort: Pulmonary effort is normal. No respiratory distress. Musculoskeletal:         General: Tenderness present. Lumbar back: Spasms, tenderness and bony tenderness present. Decreased range of motion. Back:    Skin:     General: Skin is warm and dry. Coloration: Skin is not pale. Findings: No rash. Neurological:      Mental Status: She is alert and oriented to person, place, and time. Cranial Nerves: No cranial nerve deficit. Psychiatric:         Attention and Perception: She is attentive. Speech: Speech normal.         Behavior: Behavior normal.         Thought Content: Thought content normal.         Judgment: Judgment normal.       ANN  Patricks test  positive  Yeoman's  or Gaenslen's positive  Kemps  positive  Spurlings  na  Duenas's na         Assessment:     1. Sacroiliac joint dysfunction    2. SI (sacroiliac) joint inflammation (HCC)    3. Lumbosacral spinal stenosis    4. Spondylosis of lumbar region without myelopathy or radiculopathy    5. Chronic pain syndrome    6. Bulging lumbar disc    7. Lumbar pain            Plan:      · OARRS reviewed. Current MED: 10  · Patient was not offered naloxone for home.  Testing Labs or Radiology reviewed: Lumbar MRI    Testing Labs or Radiology ordered:discussed updated MRI  Which Dr Mera Massey may order this week   Procedures:Bilateral SI injection #1   Discussed with patient about risks with procedure including infection, reaction to medication, increased pain, or bleeding.  Medications:Neurontn titration Tramadol Titration  Reviewed take 1 tab daily x 5 days then BID x 5 days then TID  thereafter   If patient is on blood thinners will need approval to hold yes or no:ASA per PCP    Does patient have implanted devices? Stimulators, AICD or Pacemaker:N/A      Meds. Prescribed:   Orders Placed This Encounter   Medications    traMADol (ULTRAM) 50 MG tablet     Sig: Take 1 tablet by mouth every 8 hours as needed for Pain for up to 30 days. Dispense:  90 tablet     Refill:  0     Reduce doses taken as pain becomes manageable    gabapentin (NEURONTIN) 300 MG capsule     Sig: Take 1 capsule by mouth 3 times daily for 30 days. Dispense:  90 capsule     Refill:  1       Return for Bilateral SI injections, Follow up after procedure.        Electronically signed by SHERRY Bustillos CNP on4/25/2022 at 10:03 AM

## 2022-04-25 NOTE — TELEPHONE ENCOUNTER
Dawn Calvo called requesting a refill of their:    raloxifene (EVISTA) 60 MG tablet QD    Send 90 day supply to [facility] said that she didn't get this RX previously due to cost and she now has insurance and would like to try. She called to  RX but the RX . They are needing a new RX.

## 2022-04-26 DIAGNOSIS — Z11.59 NEED FOR HEPATITIS C SCREENING TEST: ICD-10-CM

## 2022-04-27 ENCOUNTER — TELEPHONE (OUTPATIENT)
Dept: PHYSICAL MEDICINE AND REHAB | Age: 72
End: 2022-04-27

## 2022-04-28 ENCOUNTER — TELEPHONE (OUTPATIENT)
Dept: FAMILY MEDICINE CLINIC | Age: 72
End: 2022-04-28

## 2022-04-28 NOTE — TELEPHONE ENCOUNTER
----- Message from Chasehiren Hoang MD sent at 4/27/2022  8:28 PM EDT -----  Let her know the hep C was negative

## 2022-05-06 ENCOUNTER — PREP FOR PROCEDURE (OUTPATIENT)
Dept: PHYSICAL MEDICINE AND REHAB | Age: 72
End: 2022-05-06

## 2022-05-09 ENCOUNTER — ANESTHESIA (OUTPATIENT)
Dept: OPERATING ROOM | Age: 72
End: 2022-05-09
Payer: MEDICARE

## 2022-05-09 ENCOUNTER — HOSPITAL ENCOUNTER (OUTPATIENT)
Age: 72
Setting detail: OUTPATIENT SURGERY
Discharge: HOME | End: 2022-05-09
Attending: PAIN MEDICINE | Admitting: PAIN MEDICINE
Payer: MEDICARE

## 2022-05-09 ENCOUNTER — ANESTHESIA EVENT (OUTPATIENT)
Dept: OPERATING ROOM | Age: 72
End: 2022-05-09
Payer: MEDICARE

## 2022-05-09 ENCOUNTER — APPOINTMENT (OUTPATIENT)
Dept: GENERAL RADIOLOGY | Age: 72
End: 2022-05-09
Attending: PAIN MEDICINE
Payer: MEDICARE

## 2022-05-09 VITALS
DIASTOLIC BLOOD PRESSURE: 59 MMHG | SYSTOLIC BLOOD PRESSURE: 112 MMHG | OXYGEN SATURATION: 99 % | RESPIRATION RATE: 17 BRPM

## 2022-05-09 VITALS
OXYGEN SATURATION: 94 % | DIASTOLIC BLOOD PRESSURE: 54 MMHG | WEIGHT: 182.2 LBS | BODY MASS INDEX: 33.53 KG/M2 | HEIGHT: 62 IN | RESPIRATION RATE: 16 BRPM | TEMPERATURE: 97.4 F | SYSTOLIC BLOOD PRESSURE: 106 MMHG | HEART RATE: 64 BPM

## 2022-05-09 PROCEDURE — 6360000002 HC RX W HCPCS: Performed by: PAIN MEDICINE

## 2022-05-09 PROCEDURE — 3600000054 HC PAIN LEVEL 3 BASE: Performed by: PAIN MEDICINE

## 2022-05-09 PROCEDURE — 3700000000 HC ANESTHESIA ATTENDED CARE: Performed by: PAIN MEDICINE

## 2022-05-09 PROCEDURE — 2709999900 HC NON-CHARGEABLE SUPPLY: Performed by: PAIN MEDICINE

## 2022-05-09 PROCEDURE — 2500000003 HC RX 250 WO HCPCS: Performed by: PAIN MEDICINE

## 2022-05-09 PROCEDURE — 7100000011 HC PHASE II RECOVERY - ADDTL 15 MIN: Performed by: PAIN MEDICINE

## 2022-05-09 PROCEDURE — 2500000003 HC RX 250 WO HCPCS: Performed by: NURSE ANESTHETIST, CERTIFIED REGISTERED

## 2022-05-09 PROCEDURE — 6360000004 HC RX CONTRAST MEDICATION: Performed by: PAIN MEDICINE

## 2022-05-09 PROCEDURE — 3209999900 FLUORO FOR SURGICAL PROCEDURES

## 2022-05-09 PROCEDURE — 27096 INJECT SACROILIAC JOINT: CPT | Performed by: PAIN MEDICINE

## 2022-05-09 PROCEDURE — 7100000010 HC PHASE II RECOVERY - FIRST 15 MIN: Performed by: PAIN MEDICINE

## 2022-05-09 PROCEDURE — 6360000002 HC RX W HCPCS: Performed by: NURSE ANESTHETIST, CERTIFIED REGISTERED

## 2022-05-09 RX ORDER — PROPOFOL 10 MG/ML
INJECTION, EMULSION INTRAVENOUS PRN
Status: DISCONTINUED | OUTPATIENT
Start: 2022-05-09 | End: 2022-05-09 | Stop reason: SDUPTHER

## 2022-05-09 RX ORDER — LIDOCAINE HYDROCHLORIDE 20 MG/ML
INJECTION, SOLUTION EPIDURAL; INFILTRATION; INTRACAUDAL; PERINEURAL PRN
Status: DISCONTINUED | OUTPATIENT
Start: 2022-05-09 | End: 2022-05-09 | Stop reason: SDUPTHER

## 2022-05-09 RX ORDER — LIDOCAINE HYDROCHLORIDE 10 MG/ML
INJECTION, SOLUTION INFILTRATION; PERINEURAL PRN
Status: DISCONTINUED | OUTPATIENT
Start: 2022-05-09 | End: 2022-05-09 | Stop reason: ALTCHOICE

## 2022-05-09 RX ORDER — METHYLPREDNISOLONE ACETATE 80 MG/ML
INJECTION, SUSPENSION INTRA-ARTICULAR; INTRALESIONAL; INTRAMUSCULAR; SOFT TISSUE PRN
Status: DISCONTINUED | OUTPATIENT
Start: 2022-05-09 | End: 2022-05-09 | Stop reason: ALTCHOICE

## 2022-05-09 RX ORDER — BUPIVACAINE HYDROCHLORIDE 2.5 MG/ML
INJECTION, SOLUTION EPIDURAL; INFILTRATION; INTRACAUDAL PRN
Status: DISCONTINUED | OUTPATIENT
Start: 2022-05-09 | End: 2022-05-09 | Stop reason: ALTCHOICE

## 2022-05-09 RX ADMIN — LIDOCAINE HYDROCHLORIDE 40 MG: 20 INJECTION, SOLUTION EPIDURAL; INFILTRATION; INTRACAUDAL; PERINEURAL at 12:39

## 2022-05-09 RX ADMIN — PROPOFOL 20 MG: 10 INJECTION, EMULSION INTRAVENOUS at 12:35

## 2022-05-09 RX ADMIN — PROPOFOL 50 MG: 10 INJECTION, EMULSION INTRAVENOUS at 12:39

## 2022-05-09 ASSESSMENT — PULMONARY FUNCTION TESTS
PIF_VALUE: 0

## 2022-05-09 ASSESSMENT — PAIN - FUNCTIONAL ASSESSMENT: PAIN_FUNCTIONAL_ASSESSMENT: NONE - DENIES PAIN

## 2022-05-09 NOTE — OP NOTE
Pre-Procedure Note    Patient Name: Lacey Kumar   Date of 1201 [address] Record Number: [id number]  Date: 5/9/22     Indication:  SI pain  Consent: On file. Vital Signs:   Vitals:    05/09/22 1145   BP: 126/68   Pulse: 65   Resp: 15   Temp: 97 °F (36.1 °C)   SpO2: 96%       Past Medical History:   has a past medical history of Adenomatous colon polyp, Chronic gastritis, Coronary artery spasm (HCC), GERD (gastroesophageal reflux disease), Hiatal hernia, Hyperlipidemia, Hypertension, MVP (mitral valve prolapse), Obesity, Class II, BMI 35-39.9, with comorbidity, Osteopenia, Scoliosis (and kyphoscoliosis), idiopathic, and Vitamin D deficiency. Past Surgical History:   has a past surgical history that includes Carpal tunnel release (2007); Hand surgery (2008); Upper gastrointestinal endoscopy (10/2013); Cardiac catheterization (2008); Cardiac catheterization (12/18/2018); Endoscopy, colon, diagnostic (01/27/2019); Nerve Block Lumb Facet Level 1 Bilateral (Bilateral, 2/4/2019); Nerve Block Lumb Facet Level 1 Bilateral (Bilateral, 3/25/2019); Lumbar spine surgery (Right, 5/20/2019); Lumbar spine surgery (Left, 6/17/2019); epidural steroid injection (Bilateral, 8/1/2019); epidural steroid injection (Bilateral, 9/19/2019); Lumbar spine surgery (Right, 12/2/2019); Pain management procedure (Left, 6/15/2020); Pain management procedure (Right, 7/31/2020); Pain management procedure (N/A, 10/29/2020); Pain management procedure (Left, 12/18/2020); Pain management procedure (Right, 2/8/2021); and Pain management procedure (Bilateral, 1/27/2022). Pre-Sedation Documentation and Exam:   Vital signs have been reviewed (see flow sheet for vitals). Sedation/ Anesthesia Plan: MAC    Patient is an appropriate candidate for plan of sedation: yes    Preoperative Diagnosis:  Bilateral sacroiliitis. Postoperative Diagnosis: Bilateral  Sacroiliitis.     Procedure Performed:  Bilateral sacroiliac joint injection under fluoroscopy guidance . Indication for the Procedure: The patient failed conservative management  for pain in low back. The patient is tender over the Bilateral SI joint. Sin's test is positive on the Bilateral side. As patient is not responding to conservative management and pain is interfering with activities of daily living we decided to proceed with SI joint injection. The procedure and risks were discussed with the patient and an informed consent was obtained. Procedure:     A meaningful communication was kept up with the patient throughout the procedure. The patient is placed in prone position. Skin over the back was prepped and draped in sterile manner. Then using fluoroscopy the Bilateral sacroiliac joint was identified. Then the angle of the fluoroscopy was adjusted such that the view of the caudal aspect of the joint space was optimized. Then skin and deep tissues over the caudal aspect of the joint were infiltrated with 5 ml of 1% lidocaine. The #22-gauge, 3-1/2 inch spinal needle was introduced through the skin wheal and directed such that the tip of the needle lies in the joint space. This was confirmed by injecting 0.5 ml of Omnipaque-180 through the needle and observing the spread of the contrast along the joint space. Then after negative aspiration a total of 80 mg of depomedrol  with 2 ml of  0.25% Marcaine was injected through the needle. The needle is removed and a Band-Aid was placed over the needle insertion site. EBL-0  The patient tolerated the procedure well and vital signs remained stable. The patient was discharged home in stable condition and will be followed in the pain clinic in the next few weeks or further planning.     Electronically signed by Gisele Desouza MD on 5/9/22 at 12:41 PM EDT

## 2022-05-09 NOTE — PROGRESS NOTES
1242 Patient to phase 2 from surgery. Report from [facility] AND [facility]. Patient alert, oriented, appropriate. Denies any pain at this time. Vitals assessed, stable on room air. Injection sites clean, dry, intact. 1245 Snack and drink given per patient preference. Call light in reach. [id number] Patient states she feels ready for discharge at this time. IV taken out and dressing applied with no complications. , JONY, called and instructed to pull up to discharge doors. 1315 Patient walked to discharge doors in stable condition. Discharged with AVS and all belongings.  All questions answered regarding AVS.

## 2022-05-09 NOTE — ANESTHESIA PRE PROCEDURE
Department of Anesthesiology  Preprocedure Note       Name:  Destiny Magaña   Age:  73 y.o.  :  1950                                          MRN:  [id number]         Date:  2022      Surgeon: Kaden Vaughn):  Maggie Officer, MD    Procedure: Procedure(s):  Bilateral SI injections    Medications prior to admission:   Prior to Admission medications    Medication Sig Start Date End Date Taking? Authorizing Provider   traMADol (ULTRAM) 50 MG tablet Take 1 tablet by mouth every 8 hours as needed for Pain for up to 30 days. 22  SHERRY Gutierrez CNP   gabapentin (NEURONTIN) 300 MG capsule Take 1 capsule by mouth 3 times daily for 30 days. 22  SHERRY Gutierrez CNP   raloxifene (EVISTA) 60 MG tablet Take 1 tablet by mouth daily 22   Jacqueline Gillette MD   pantoprazole (PROTONIX) 40 MG tablet TAKE 1 TABLET BY MOUTH EVERY DAY 22   Jacqueline Gillette MD   amLODIPine (NORVASC) 2.5 MG tablet Take 1 tablet by mouth daily 21  Jacqueline Gillette MD   acetaminophen (AMINOFEN) 325 MG tablet Take 2 tablets by mouth every 6 hours as needed for Pain 21   SHERRY Mckenna CNP   ferrous sulfate (IRON 325) 325 (65 Fe) MG tablet Take 325 mg by mouth daily (with breakfast)    Historical Provider, MD   vitamin B-12 (CYANOCOBALAMIN) 1000 MCG tablet Take 1,000 mcg by mouth daily    Historical Provider, MD   simvastatin (ZOCOR) 20 MG tablet TAKE 1 TABLET BY MOUTH EVERY DAY 20   Jacqueline Gillette MD   lisinopril (PRINIVIL;ZESTRIL) 20 MG tablet TAKE 1 TABLET BY MOUTH DAILY SHE NEEDS SEEN FOR MORE 10/26/20   Jacqueline Gillette MD   ibuprofen (ADVIL;MOTRIN) 200 MG tablet Take 200 mg by mouth every 6 hours as needed for Pain    Historical Provider, MD   Cholecalciferol (VITAMIN D3) 5000 UNITS CAPS Take 1 capsule by mouth daily. Jacqueline Gillette MD   aspirin 81 MG tablet Take 81 mg by mouth daily.       Historical Provider, MD   calcium carbonate 600 MG TABS tablet Take 1 tablet by mouth 2 times daily. Historical Provider, MD       Current medications:    No current facility-administered medications for this encounter. Allergies: Allergies   Allergen Reactions    Codeine Swelling    Neomycin Swelling     Ear drop caused swelling of the canal    Pcn [Penicillins] Rash       Problem List:    Patient Active Problem List   Diagnosis Code    Anxiety and depression F41.9, F32. A    Obesity, Class II, BMI 35-39.9, with comorbidity [id number]    Osteopenia M85.80    Vitamin D deficiency E55.9    Chronic gastritis K29.50    Hypertrophy of tonsils J35.1    Globus pharyngeus R19.8    Disease of larynx J38.7    Gastroesophageal reflux disease K21.9    Dysphagia R13.10    DNS (deviated nasal septum) J34.2    Epistaxis R04.0    Nasal mucositis (ulcerative) J34.81    Multinodular goiter E04.2    Pure hypercholesterolemia E78.00    Hypertension I10    Lumbar spondylosis M47.816    Spondylosis of lumbar region without myelopathy or radiculopathy M47.816    Bulging lumbar disc M51.26    Lumbosacral spondylosis without myelopathy M47.817    Anxiety F41.9    Recurrent major depressive disorder in partial remission (HCC) F33.41    Chronic otitis media of right ear H66.91    SARA (middle ear effusion) H65.90       Past Medical History:        Diagnosis Date    Adenomatous colon polyp 2012    Chronic gastritis 2013    Coronary artery spasm (HCC)     GERD (gastroesophageal reflux disease) 2002    Hiatal hernia 01/27/2019    Hyperlipidemia     Hypertension 2001    MVP (mitral valve prolapse)     Obesity, Class II, BMI 35-39.9, with comorbidity 2012    Osteopenia 10/2012    Scoliosis (and kyphoscoliosis), idiopathic     Vitamin D deficiency 2012       Past Surgical History:        Procedure Laterality Date    CARDIAC CATHETERIZATION  2008    CARDIAC CATHETERIZATION  12/18/2018    CARPAL TUNNEL RELEASE  2007    right    ENDOSCOPY, COLON, DIAGNOSTIC  01/27/2019    esophagus stretching    EPIDURAL STEROID INJECTION Bilateral 8/1/2019    LESI L4 performed by Leila Ayala MD at [facility] 2 Bilateral 9/19/2019    LESI L4 or other level #2 performed by Leila Ayala MD at [address]  SURGERY  2008    left trigger finger    LUMBAR SPINE SURGERY Right 5/20/2019    LUMBAR RFA, L2-3, L3-4, L4-5, RIGHT SIDE performed by Leila Ayala MD at [address] Left 6/17/2019    LUMBAR RFA L2-3, L3-4, L4-5, LEFT SIDE performed by Leila Ayala MD at [address] Right 12/2/2019    Lumbar RFA L2-3,L3-4 and L4-5 right performed by Leila Ayala MD at [place] [address] FACET LEVEL 1 BILATERAL Bilateral 2/4/2019    LUMBAR FACET MBB @ L2-3,  L3-4,  L4-5   BILATERAL performed by Leila Ayala MD at [place] [address] FACET LEVEL 1 BILATERAL Bilateral 3/25/2019    LUMBAR FACET MBB @ L2-3,  L3-4,  L4-5   BILATERAL performed by Leila Ayala MD at [facility] Left 6/15/2020    Lumbar RFA  L2-3,L3-4 and L4-5 left, performed by Leila Ayala MD at [facility] Right 7/31/2020    Lumbar RFA L2-3,L3-4 and L4-5 right performed by Leila Ayala MD at [facility] N/A 10/29/2020    LESI L4 performed by Leila Ayala MD at [facility] Left 12/18/2020    Lumbar RFA L2-3, L3-4 and L4-5 left after 12/15/2020 performed by Leila Ayala MD at [facility] Right 2/8/2021    Lumbar RFA Right side  L2-3, 3-4, 4-5 performed by Leila Ayala MD at [place] [address] Bilateral 1/27/2022 Lumbar RFA Bilateral  L3-4, 4-5 performed by Verna Camp MD at [facility]  10/2013       Social History:    Social History     Tobacco Use    Smoking status: Never Smoker    Smokeless tobacco: Never Used   Substance Use Topics    Alcohol use: No                                Counseling given: Not Answered      Vital Signs (Current):   Vitals:    05/09/22 1145   BP: 126/68   Pulse: 65   Resp: 15   Temp: 36.1 °C (97 °F)   TempSrc: Temporal   SpO2: 96%   Weight: 182 lb 3.2 oz (82.6 kg)   Height: 5' 2\" (1.575 m)                                              BP Readings from Last 3 Encounters:   05/09/22 126/68   05/09/22 (!) 112/59   04/25/22 124/70       NPO Status: Time of last liquid consumption: 0530 (sip with meds)                        Time of last solid consumption: 2000                        Date of last liquid consumption: 05/09/22                        Date of last solid food consumption: 05/08/22    BMI:   Wt Readings from Last 3 Encounters:   05/09/22 182 lb 3.2 oz (82.6 kg)   04/25/22 181 lb (82.1 kg)   01/27/22 180 lb 9.6 oz (81.9 kg)     Body mass index is 33.32 kg/m². CBC:   Lab Results   Component Value Date    WBC 6.9 09/02/2021    RBC 4.41 09/02/2021    HGB 13.6 09/02/2021    HCT 43.3 09/02/2021    MCV 98.2 09/02/2021    RDW 14.1 08/30/2019     09/02/2021       CMP:   Lab Results   Component Value Date     09/02/2021    K 4.7 09/02/2021     09/02/2021    CO2 25 09/02/2021    BUN 29 09/02/2021    CREATININE 0.6 11/26/2021    AGRATIO 1.7 08/30/2019    LABGLOM >90 11/26/2021    GLUCOSE 108 09/02/2021    GLUCOSE 112 08/30/2019    PROT 7.6 06/09/2021    CALCIUM 9.8 09/02/2021    BILITOT 0.5 06/09/2021    ALKPHOS 92 06/09/2021    AST 23 06/09/2021    ALT 27 06/09/2021       POC Tests: No results for input(s): POCGLU, POCNA, POCK, POCCL, POCBUN, POCHEMO, POCHCT in the last 72 hours.     Coags:   Lab Results   Component Value Date    PROTIME 12.0 12/14/2018    INR 1.04 12/14/2018       HCG (If Applicable): No results found for: PREGTESTUR, PREGSERUM, HCG, HCGQUANT     ABGs: No results found for: PHART, PO2ART, RJU8YTJ, HFZ5CAG, BEART, R9XIDJBO     Type & Screen (If Applicable):  No results found for: LABABO, LABRH    Drug/Infectious Status (If Applicable):  No results found for: HIV, HEPCAB    COVID-19 Screening (If Applicable):   Lab Results   Component Value Date    COVID19 Not Detected 07/27/2020           Anesthesia Evaluation  Patient summary reviewed and Nursing notes reviewed no history of anesthetic complications:   Airway: Mallampati: III  TM distance: >3 FB   Neck ROM: full  Mouth opening: > = 3 FB Dental:          Pulmonary:Negative Pulmonary ROS and normal exam  breath sounds clear to auscultation                             Cardiovascular:  Exercise tolerance: good (>4 METS),   (+) hypertension:, CAD:,                   Neuro/Psych:   (+) neuromuscular disease:, psychiatric history:            GI/Hepatic/Renal:   (+) hiatal hernia, GERD:,           Endo/Other: Negative Endo/Other ROS             Pt had no PAT visit       Abdominal:             Vascular: negative vascular ROS. Other Findings:             Anesthesia Plan      MAC     ASA 3       Induction: intravenous. Anesthetic plan and risks discussed with patient. Plan discussed with attending.     Attending anesthesiologist reviewed and agrees with Preprocedure content              SHERRY Bone CRNA   5/9/2022

## 2022-05-09 NOTE — H&P
[facility]  History and Physical Update    Pt Name: Dilcia Jean  MRN: [id number]  YOB: 1950  Date of evaluation: 5/9/2022      I have examined the patient and reviewed the H&P/Consult and there are no changes to the patient or plans.         Electronically signed by Rojas Huitron MD on 5/9/2022 at 11:43 AM

## 2022-05-09 NOTE — ANESTHESIA POSTPROCEDURE EVALUATION
Department of Anesthesiology  Postprocedure Note    Patient: Reynaldo Quarles  MRN: [id number]  YOB: 1950  Date of evaluation: 5/9/2022  Time:  1:59 PM     Procedure Summary     Date: 05/09/22 Room / Location: [address] [place] [address] 03 / Nathen Ganser    Anesthesia Start: 1234 Anesthesia Stop: 7257    Procedure: Bilateral SI injections (Bilateral ) Diagnosis: (Sacroiliac joint dysfunction)    Surgeons: Andrew Aleman MD Responsible Provider: [address] [place] [address],     Anesthesia Type: MAC ASA Status: 3          Anesthesia Type: No value filed. Eloy Phase I: Eloy Score: 10    Eloy Phase II: Eloy Score: 10    Last vitals: Reviewed and per EMR flowsheets.        Anesthesia Post Evaluation    Patient location during evaluation: bedside  Patient participation: complete - patient participated  Level of consciousness: awake and alert  Pain score: 0  Airway patency: patent  Nausea & Vomiting: no nausea and no vomiting  Complications: no  Cardiovascular status: hemodynamically stable and blood pressure returned to baseline  Respiratory status: spontaneous ventilation, room air and acceptable  Hydration status: stable

## 2022-05-09 NOTE — H&P
H&P    Lumbar RFA Bilateral L3-4,4-5  1/27/2022 states 50% pain relief starting to wear off some already. She has f/u with Dr Yessica Martinez prior surgeon was Dr Berry Orozco. She continues to have low back bilateral Si pain increases mostly with standing walking. Her main pain complaint is her bilateral SI  area. Pain increases with bending, lifting, twisting , turning torso, reaching, pushing, pulling, walking, standing, stairs and getting up and down. Treatments Tried PT, ice, heat, Chiropractor, NSAIDS, muscle relaxer, OTC rubs creams patches, steroid burst and injections  Pain Description sharp, shooting, stabbing, burning and aching  She denies any ER visits or new health issues since last visit.     Pain scale with out pain medications or at its worst is 9/10. Pain scale with pain medications or at its best is 4/10. LAST UDS  2/2019 WNL        Prior Injections:     She has had LESI  10/2020 with 60% pain relief for 3-4 months        Radiology:  FINDINGS:           There is 5 mm of anterolisthesis of L4 on L5. This is new. There are degenerative marrow changes in the endplates at the K5-8 level. There is associated edema on the right.  There are no compression fractures.  No pars defects are noted.  There is disc    desiccation throughout.       The distal spinal cord, conus medullaris and cauda equina are normal.        There are mild degenerative changes in the lower thoracic spine.       On the axial images, at L1-L2, there are mild facet degenerative changes. There is no spinal canal stenosis. There is mild bilateral foraminal stenosis.       At L2-L3, there are facet degenerative changes. There is thickening of the ligamentum flavum. There is a diffuse disc bulge. There is mild spinal canal stenosis. There is moderate severity right foraminal stenosis. There is mild left foraminal stenosis.       At L3-L4, there are mild facet degenerative changes. There is thickening of ligamentum flavum.  There is mild spinal canal stenosis. There is no significant foraminal stenosis.       At L4-L5, there are severe left-sided facet degenerative changes. There are moderate severity right-sided facet degenerative changes. There is a diffuse disc bulge. There is moderate severity spinal canal stenosis. There is moderate to severe left    foraminal stenosis.       At L5-S1, there is no spinal canal stenosis. There is mild left foraminal stenosis.       There are no suspicious findings in the visualized aspects of the retroperitoneum and paraspinal soft tissues.           Impression       1. Moderate to severe left foraminal stenosis with moderate severity spinal canal stenosis at the L4-5 level. There is also 5 mm of anterolisthesis at this level. 2. Mild spinal canal stenosis at the L3-4 level. 3. Mild spinal canal stenosis with moderate severity right and mild left foraminal stenosis at the L2-3 level.                 The patientis allergic to codeine, neomycin, and pcn [penicillins].       Subjective:      Review of Systems   Constitutional: Positive for activity change. Negative for chills, diaphoresis, fatigue and fever. Cardiovascular:        CAD  MVP   Genitourinary: Negative for difficulty urinating, frequency and urgency. Musculoskeletal: Positive for arthralgias, back pain, gait problem and myalgias. Negative for neck pain and neck stiffness. Skin: Negative for color change, rash and wound. Allergic/Immunologic: Negative for environmental allergies and food allergies. Neurological: Negative for dizziness, seizures, light-headedness, numbness and headaches. Hematological: Does not bruise/bleed easily. Psychiatric/Behavioral: Negative for sleep disturbance.  The patient is not nervous/anxious.          Objective:      Vitals       Vitals:     04/25/22 0934   BP: 124/70   Site: Left Upper Arm   Position: Sitting   Cuff Size: Large Adult   Pulse: 68   Weight: 181 lb (82.1 kg)   Height: 5' 2\" (1.575 m)       Physical Exam  Vitals reviewed. Constitutional:       General: She is not in acute distress. Appearance: She is well-developed. She is not diaphoretic. HENT:      Head: Normocephalic and atraumatic. Not macrocephalic and not microcephalic. Right Ear: External ear normal.      Left Ear: External ear normal.   Eyes:      General:         Right eye: No discharge. Left eye: No discharge. Conjunctiva/sclera: Conjunctivae normal.   Neck:      Trachea: No tracheal deviation. Pulmonary:      Effort: Pulmonary effort is normal. No respiratory distress. Musculoskeletal:         General: Tenderness present. Lumbar back: Spasms, tenderness and bony tenderness present. Decreased range of motion. Back:    Skin:     General: Skin is warm and dry. Coloration: Skin is not pale. Findings: No rash. Neurological:      Mental Status: She is alert and oriented to person, place, and time. Cranial Nerves: No cranial nerve deficit. Psychiatric:         Attention and Perception: She is attentive. Speech: Speech normal.         Behavior: Behavior normal.         Thought Content: Thought content normal.         Judgment: Judgment normal.         ANN  Patricks test  positive  Yeoman's  or Gaenslen's positive  Kemps  positive  Spurlings  na  Duenas's na        Assessment:      1. Sacroiliac joint dysfunction    2. SI (sacroiliac) joint inflammation (HCC)    3. Lumbosacral spinal stenosis    4. Spondylosis of lumbar region without myelopathy or radiculopathy    5. Chronic pain syndrome    6. Bulging lumbar disc    7. Lumbar pain       Plan:      · OARRS reviewed. Current MED: 10  · Patient was not offered naloxone for home.    · Testing Labs or Radiology reviewed: Lumbar MRI   · Testing Labs or Radiology ordered:discussed updated MRI  Which Dr Josefina Vergara may order this week  · Procedures:Bilateral SI injection #1  · Discussed with patient about risks with procedure including infection, reaction to medication, increased pain, or bleeding. · Medications:Neurontn titration Tramadol Titration  Reviewed take 1 tab daily x 5 days then BID x 5 days then TID  thereafter  · If patient is on blood thinners will need approval to hold yes or no:ASA per PCP   · Does patient have implanted devices?  Stimulators, AICD or Pacemaker:N/A                Return for Bilateral SI injections, Follow up after procedure.

## 2022-05-25 ENCOUNTER — HOSPITAL ENCOUNTER (OUTPATIENT)
Dept: MRI IMAGING | Age: 72
Discharge: HOME | End: 2022-05-25
Payer: MEDICARE

## 2022-05-25 DIAGNOSIS — S33.5XXA LUMBAR SPRAIN, INITIAL ENCOUNTER: ICD-10-CM

## 2022-05-25 PROCEDURE — 72148 MRI LUMBAR SPINE W/O DYE: CPT

## 2022-05-31 ENCOUNTER — TELEPHONE (OUTPATIENT)
Dept: FAMILY MEDICINE CLINIC | Age: 72
End: 2022-05-31

## 2022-05-31 NOTE — TELEPHONE ENCOUNTER
Pt called asking since she is taking RX Evista, was she supposed to stop the OTC Calcium? Pt is taking 1000 mg Calcium daily.     Please call pt once addressed

## 2022-06-07 ENCOUNTER — TELEPHONE (OUTPATIENT)
Dept: FAMILY MEDICINE CLINIC | Age: 72
End: 2022-06-07

## 2022-06-07 NOTE — TELEPHONE ENCOUNTER
Let her know that I do not think it's a problem and I will speak to the radiologist tomorrow about it.

## 2022-06-07 NOTE — TELEPHONE ENCOUNTER
Patient called stating she saw Dr Bhavesh Solis and he told her to check with her PCP regarding a lesion of her liver seen on her MRI.     Please call her back [phone]

## 2022-06-10 ENCOUNTER — TELEPHONE (OUTPATIENT)
Dept: FAMILY MEDICINE CLINIC | Age: 72
End: 2022-06-10

## 2022-06-10 NOTE — TELEPHONE ENCOUNTER
It says patient needs cardiac clearance from dr. Lissy Jennings as well. Ok to schedule patient with you also?

## 2022-06-24 ENCOUNTER — TELEPHONE (OUTPATIENT)
Dept: FAMILY MEDICINE CLINIC | Age: 72
End: 2022-06-24

## 2022-06-24 RX ORDER — PREDNISONE 20 MG/1
20 TABLET ORAL DAILY
Qty: 7 TABLET | Refills: 0 | Status: SHIPPED | OUTPATIENT
Start: 2022-06-24 | End: 2022-07-01

## 2022-06-24 NOTE — TELEPHONE ENCOUNTER
Pt called stating that she has poison ivy. It is on her arms and ankle. She is asking for prednisone to be called in for her.      [facility] [address]

## 2022-07-14 ENCOUNTER — OFFICE VISIT (OUTPATIENT)
Dept: FAMILY MEDICINE CLINIC | Age: 72
End: 2022-07-14

## 2022-07-14 VITALS
SYSTOLIC BLOOD PRESSURE: 132 MMHG | RESPIRATION RATE: 16 BRPM | DIASTOLIC BLOOD PRESSURE: 72 MMHG | WEIGHT: 184.13 LBS | BODY MASS INDEX: 33.88 KG/M2 | HEART RATE: 80 BPM | HEIGHT: 62 IN

## 2022-07-14 DIAGNOSIS — Z11.52 ENCOUNTER FOR SCREENING FOR COVID-19: Primary | ICD-10-CM

## 2022-07-14 DIAGNOSIS — Z00.00 MEDICARE ANNUAL WELLNESS VISIT, SUBSEQUENT: ICD-10-CM

## 2022-07-14 DIAGNOSIS — F33.41 RECURRENT MAJOR DEPRESSIVE DISORDER IN PARTIAL REMISSION (HCC): ICD-10-CM

## 2022-07-14 PROBLEM — I05.9 MITRAL VALVE DISORDER: Status: ACTIVE | Noted: 2022-07-14

## 2022-07-14 PROBLEM — H91.8X9 ASYMMETRICAL HEARING LOSS: Status: ACTIVE | Noted: 2022-07-14

## 2022-07-14 PROBLEM — H81.90 DISORDER OF LABYRINTH: Status: ACTIVE | Noted: 2022-07-14

## 2022-07-14 PROBLEM — E78.5 HYPERLIPIDEMIA: Status: ACTIVE | Noted: 2022-07-14

## 2022-07-14 PROBLEM — Z96.22 MYRINGOTOMY TUBE(S) STATUS: Status: ACTIVE | Noted: 2022-07-14

## 2022-07-14 PROBLEM — H91.8X3 ASYMMETRICAL HEARING LOSS: Status: ACTIVE | Noted: 2022-07-14

## 2022-07-14 PROBLEM — E83.42 HYPOMAGNESEMIA: Status: ACTIVE | Noted: 2022-07-14

## 2022-07-14 PROCEDURE — G0439 PPPS, SUBSEQ VISIT: HCPCS | Performed by: FAMILY MEDICINE

## 2022-07-14 PROCEDURE — 3017F COLORECTAL CA SCREEN DOC REV: CPT | Performed by: FAMILY MEDICINE

## 2022-07-14 PROCEDURE — 1123F ACP DISCUSS/DSCN MKR DOCD: CPT | Performed by: FAMILY MEDICINE

## 2022-07-14 SDOH — ECONOMIC STABILITY: FOOD INSECURITY: WITHIN THE PAST 12 MONTHS, THE FOOD YOU BOUGHT JUST DIDN'T LAST AND YOU DIDN'T HAVE MONEY TO GET MORE.: NEVER TRUE

## 2022-07-14 SDOH — ECONOMIC STABILITY: FOOD INSECURITY: WITHIN THE PAST 12 MONTHS, YOU WORRIED THAT YOUR FOOD WOULD RUN OUT BEFORE YOU GOT MONEY TO BUY MORE.: NEVER TRUE

## 2022-07-14 ASSESSMENT — PATIENT HEALTH QUESTIONNAIRE - PHQ9
9. THOUGHTS THAT YOU WOULD BE BETTER OFF DEAD, OR OF HURTING YOURSELF: 0
1. LITTLE INTEREST OR PLEASURE IN DOING THINGS: 0
5. POOR APPETITE OR OVEREATING: 0
4. FEELING TIRED OR HAVING LITTLE ENERGY: 0
3. TROUBLE FALLING OR STAYING ASLEEP: 0
SUM OF ALL RESPONSES TO PHQ QUESTIONS 1-9: 0
SUM OF ALL RESPONSES TO PHQ QUESTIONS 1-9: 0
2. FEELING DOWN, DEPRESSED OR HOPELESS: 0
SUM OF ALL RESPONSES TO PHQ QUESTIONS 1-9: 0
SUM OF ALL RESPONSES TO PHQ9 QUESTIONS 1 & 2: 0
8. MOVING OR SPEAKING SO SLOWLY THAT OTHER PEOPLE COULD HAVE NOTICED. OR THE OPPOSITE, BEING SO FIGETY OR RESTLESS THAT YOU HAVE BEEN MOVING AROUND A LOT MORE THAN USUAL: 0
6. FEELING BAD ABOUT YOURSELF - OR THAT YOU ARE A FAILURE OR HAVE LET YOURSELF OR YOUR FAMILY DOWN: 0
7. TROUBLE CONCENTRATING ON THINGS, SUCH AS READING THE NEWSPAPER OR WATCHING TELEVISION: 0
10. IF YOU CHECKED OFF ANY PROBLEMS, HOW DIFFICULT HAVE THESE PROBLEMS MADE IT FOR YOU TO DO YOUR WORK, TAKE CARE OF THINGS AT HOME, OR GET ALONG WITH OTHER PEOPLE: 0
SUM OF ALL RESPONSES TO PHQ QUESTIONS 1-9: 0

## 2022-07-14 ASSESSMENT — SOCIAL DETERMINANTS OF HEALTH (SDOH): HOW HARD IS IT FOR YOU TO PAY FOR THE VERY BASICS LIKE FOOD, HOUSING, MEDICAL CARE, AND HEATING?: NOT HARD AT ALL

## 2022-07-14 NOTE — PROGRESS NOTES
No components found for: CHLPL  Lab Results   Component Value Date    TRIG 62 12/14/2018     Lab Results   Component Value Date     (A) 06/09/2021     Lab Results   Component Value Date    LDLCALC 97 06/09/2021     No results found for: LABVLDL    Lab Results   Component Value Date    ALT 27 06/09/2021    AST 23 06/09/2021    ALKPHOS 92 06/09/2021    BILITOT 0.5 06/09/2021           Is patient currently taking any cholesterol medications? Yes   If yes, see med list as above    Is the patient reporting any side effects of cholesterol medications? No    Is the patient taking any over the counter medications? Yes   If yes, see med list as above    Is the patient taking a daily aspirin? Yes      Patient Self-Management Goal for Chronic Condition  Goal: I will take all medications as prescribed by my doctor, and I will call the office if I am having any medication problems. Barriers to success: none  Plan for overcoming my barriers: N/A     Confidence: 8/10  Date goal set: 7/14/22  Date goal attained:     Have you seen any other physician or provider since your last visit no    Have you had any other diagnostic tests since your last visit? no    Have you changed or stopped any medications since your last visit including any over-the-counter medicines, vitamins, or herbal medicines? no     Are you taking all your prescribed medications?  Yes    If NO, why?

## 2022-07-14 NOTE — PROGRESS NOTES
Pulse: 80   Resp: 16   Weight: 184 lb 2 oz (83.5 kg)   Height: 5' 2\" (1.575 m)      Body mass index is 33.68 kg/m². General Appearance: alert and oriented to person, place and time, well-developed and well-nourished, in no acute distress  Skin: warm and dry, no rash or erythema  Head: normocephalic and atraumatic  Pulmonary/Chest: clear to auscultation bilaterally- no wheezes, rales or rhonchi, normal air movement, no respiratory distress  Cardiovascular: normal rate, regular rhythm, normal S1 and S2, no murmurs and no gallops  Musculoskeletal: normal range of motion, no joint swelling, deformity or tenderness  Neurologic: gait and coordination normal and speech normal       Allergies   Allergen Reactions    Codeine Swelling    Neomycin Swelling     Ear drop caused swelling of the canal    Pcn [Penicillins] Rash     Prior to Visit Medications    Medication Sig Taking? Authorizing Provider   gabapentin (NEURONTIN) 300 MG capsule Take 1 capsule by mouth 3 times daily for 30 days.   SHERRY Lima - CNP   raloxifene (EVISTA) 60 MG tablet Take 1 tablet by mouth daily  Kianna Colón MD   pantoprazole (PROTONIX) 40 MG tablet TAKE 1 TABLET BY MOUTH EVERY DAY  Kianna Colón MD   amLODIPine (NORVASC) 2.5 MG tablet Take 1 tablet by mouth daily  Kianna Colón MD   acetaminophen (AMINOFEN) 325 MG tablet Take 2 tablets by mouth every 6 hours as needed for Pain  SHERRY Lopez - CNP   ferrous sulfate (IRON 325) 325 (65 Fe) MG tablet Take 325 mg by mouth daily (with breakfast)  Historical Provider, MD   vitamin B-12 (CYANOCOBALAMIN) 1000 MCG tablet Take 1,000 mcg by mouth daily  Historical Provider, MD   simvastatin (ZOCOR) 20 MG tablet TAKE 1 TABLET BY MOUTH EVERY DAY  Kianna Colón MD   lisinopril (PRINIVIL;ZESTRIL) 20 MG tablet TAKE 1 TABLET BY MOUTH DAILY SHE NEEDS SEEN FOR MORE  Kianna Colón MD   ibuprofen (ADVIL;MOTRIN) 200 MG tablet Take 200 mg by mouth every 6 hours as needed for Pain  Historical Provider, MD   Cholecalciferol (VITAMIN D3) 5000 UNITS CAPS Take 1 capsule by mouth daily. Winston Doyle MD   aspirin 81 MG tablet Take 81 mg by mouth daily. Historical Provider, MD   calcium carbonate 600 MG TABS tablet Take 1 tablet by mouth 2 times daily.     Historical Provider, MD Vasquez (Including outside providers/suppliers regularly involved in providing care):   Patient Care Team:  Winston Doyle MD as PCP - General (Family Medicine)  Winston Doyle MD as PCP - [address] [place] [address] Provider  Starla Chen MD as Consulting Physician (Gastroenterology)  Aubrie Meadows MD as Consulting Physician (Orthopedic Surgery)  Rayne Carbone DO as Consulting Physician (Cardiology)     Reviewed and updated this visit:  Tobacco  Allergies  Meds  Med Hx  Surg Hx  Soc Hx  Fam Hx

## 2022-07-26 RX ORDER — GABAPENTIN 300 MG/1
300 CAPSULE ORAL 3 TIMES DAILY
Qty: 90 CAPSULE | Refills: 1 | OUTPATIENT
Start: 2022-07-26 | End: 2022-08-25

## 2022-08-24 ENCOUNTER — HOSPITAL ENCOUNTER (OUTPATIENT)
Age: 72
Discharge: HOME | End: 2022-08-24
Payer: COMMERCIAL

## 2022-08-24 ENCOUNTER — HOSPITAL ENCOUNTER (OUTPATIENT)
Dept: GENERAL RADIOLOGY | Age: 72
Discharge: HOME | End: 2022-08-24
Payer: COMMERCIAL

## 2022-08-24 DIAGNOSIS — M48.062 LUMBAR STENOSIS WITH NEUROGENIC CLAUDICATION: ICD-10-CM

## 2022-08-24 DIAGNOSIS — M43.16 SPONDYLOLISTHESIS OF LUMBAR REGION: ICD-10-CM

## 2022-08-24 LAB
EKG ATRIAL RATE: 58 BPM
EKG P AXIS: 12 DEGREES
EKG P-R INTERVAL: 152 MS
EKG Q-T INTERVAL: 432 MS
EKG QRS DURATION: 90 MS
EKG QTC CALCULATION (BAZETT): 424 MS
EKG R AXIS: -42 DEGREES
EKG T AXIS: 47 DEGREES
EKG VENTRICULAR RATE: 58 BPM

## 2022-08-24 PROCEDURE — 93010 ELECTROCARDIOGRAM REPORT: CPT | Performed by: INTERNAL MEDICINE

## 2022-08-24 PROCEDURE — 71046 X-RAY EXAM CHEST 2 VIEWS: CPT

## 2022-08-24 PROCEDURE — 93005 ELECTROCARDIOGRAM TRACING: CPT | Performed by: ORTHOPAEDIC SURGERY

## 2022-08-29 ENCOUNTER — OFFICE VISIT (OUTPATIENT)
Dept: FAMILY MEDICINE CLINIC | Age: 72
End: 2022-08-29

## 2022-08-29 VITALS
WEIGHT: 181.5 LBS | HEART RATE: 76 BPM | BODY MASS INDEX: 33.4 KG/M2 | SYSTOLIC BLOOD PRESSURE: 126 MMHG | DIASTOLIC BLOOD PRESSURE: 76 MMHG | HEIGHT: 62 IN | RESPIRATION RATE: 14 BRPM

## 2022-08-29 DIAGNOSIS — F33.41 RECURRENT MAJOR DEPRESSIVE DISORDER IN PARTIAL REMISSION (HCC): ICD-10-CM

## 2022-08-29 DIAGNOSIS — I10 ESSENTIAL HYPERTENSION: ICD-10-CM

## 2022-08-29 DIAGNOSIS — Z01.818 PRE-OP EVALUATION: Primary | ICD-10-CM

## 2022-08-29 PROCEDURE — 1090F PRES/ABSN URINE INCON ASSESS: CPT | Performed by: FAMILY MEDICINE

## 2022-08-29 PROCEDURE — G8427 DOCREV CUR MEDS BY ELIG CLIN: HCPCS | Performed by: FAMILY MEDICINE

## 2022-08-29 PROCEDURE — 99214 OFFICE O/P EST MOD 30 MIN: CPT | Performed by: FAMILY MEDICINE

## 2022-08-29 PROCEDURE — G8417 CALC BMI ABV UP PARAM F/U: HCPCS | Performed by: FAMILY MEDICINE

## 2022-08-29 RX ORDER — LISINOPRIL 20 MG/1
20 TABLET ORAL DAILY
COMMUNITY
Start: 2022-08-29

## 2022-08-29 ASSESSMENT — ENCOUNTER SYMPTOMS
SHORTNESS OF BREATH: 0
BACK PAIN: 1
SINUS PRESSURE: 0
CONSTIPATION: 0

## 2022-08-29 NOTE — PROGRESS NOTES
Juno Nova (:  1950) is a 73 y.o. female,Established patient, here for evaluation of the following chief complaint(s):  Pre-op Exam         ASSESSMENT/PLAN:   Diagnosis Orders   1. Pre-op evaluation        2. Recurrent major depressive disorder in partial remission ([facility] [address]        3. Essential hypertension  lisinopril (PRINIVIL;ZESTRIL) 20 MG tablet          She is medically cleared for surgery  See me in February still    Subjective   SUBJECTIVE/OBJECTIVE:  HPI  She is going to have lumbar spine surgery  We reviewed the past surgeries. There is no personal or FH of DVT or PE or anesthesia problems  She was able to get the EKG and CXR that were fine but there is a Dx code problem with the labs so they've not been done. The depression is doing well  Review of Systems   Constitutional:  Negative for fatigue. HENT:  Negative for sinus pressure. Eyes:  Negative for visual disturbance. Respiratory:  Negative for shortness of breath. Cardiovascular:  Negative for chest pain. Gastrointestinal:  Negative for constipation. Genitourinary: Negative. Musculoskeletal:  Positive for arthralgias, back pain and myalgias. Skin:  Negative for rash. Neurological:  Negative for headaches. The patient's medications, allergies, past medical problems, surgical, social, and family histories were reviewed and updated as needed. Objective   Physical Exam  Constitutional:       General: She is not in acute distress. Appearance: She is well-developed. HENT:      Head: Normocephalic and atraumatic. Right Ear: External ear normal.      Left Ear: External ear normal.      Nose: Nose normal.      Mouth/Throat:      Pharynx: No oropharyngeal exudate. Eyes:      General: No scleral icterus. Conjunctiva/sclera: Conjunctivae normal.   Neck:      Thyroid: No thyromegaly. Vascular: No carotid bruit. Trachea: No tracheal deviation.    Cardiovascular:      Rate and Rhythm: Normal rate and regular rhythm. Heart sounds: Normal heart sounds. Pulmonary:      Effort: Pulmonary effort is normal.      Breath sounds: Normal breath sounds. Abdominal:      General: Bowel sounds are normal.      Palpations: Abdomen is soft. There is no mass. Musculoskeletal:      Cervical back: Neck supple. Lymphadenopathy:      Cervical: No cervical adenopathy. Skin:     General: Skin is warm and dry. Neurological:      Mental Status: She is alert and oriented to person, place, and time. Psychiatric:         Behavior: Behavior normal.    Blood pressure 126/76, pulse 76, resp. rate 14, height 5' 2\" (1.575 m), weight 181 lb 8 oz (82.3 kg), not currently breastfeeding. An electronic signature was used to authenticate this note.     --Scott Mobley MD

## 2022-08-30 ENCOUNTER — HOSPITAL ENCOUNTER (OUTPATIENT)
Age: 72
Discharge: HOME | End: 2022-08-30
Payer: COMMERCIAL

## 2022-08-30 LAB
ANION GAP SERPL CALCULATED.3IONS-SCNC: 13 MEQ/L (ref 8–16)
APTT: 29.8 SECONDS (ref 22–38)
BUN BLDV-MCNC: 34 MG/DL (ref 7–22)
CHLORIDE BLD-SCNC: 105 MEQ/L (ref 98–111)
CO2: 23 MEQ/L (ref 23–33)
CREAT SERPL-MCNC: 0.6 MG/DL (ref 0.4–1.2)
ERYTHROCYTE [DISTWIDTH] IN BLOOD BY AUTOMATED COUNT: 12.9 % (ref 11.5–14.5)
ERYTHROCYTE [DISTWIDTH] IN BLOOD BY AUTOMATED COUNT: 46.3 FL (ref 35–45)
GFR SERPL CREATININE-BSD FRML MDRD: > 90 ML/MIN/1.73M2
GLUCOSE BLD-MCNC: 116 MG/DL (ref 70–108)
HCT VFR BLD CALC: 41.4 % (ref 37–47)
HEMOGLOBIN: 13.6 GM/DL (ref 12–16)
INR BLD: 0.92 (ref 0.85–1.13)
MCH RBC QN AUTO: 32 PG (ref 26–33)
MCHC RBC AUTO-ENTMCNC: 32.9 GM/DL (ref 32.2–35.5)
MCV RBC AUTO: 97.4 FL (ref 81–99)
PLATELET # BLD: 214 THOU/MM3 (ref 130–400)
PMV BLD AUTO: 9.9 FL (ref 9.4–12.4)
POTASSIUM SERPL-SCNC: 4.3 MEQ/L (ref 3.5–5.2)
RBC # BLD: 4.25 MILL/MM3 (ref 4.2–5.4)
SODIUM BLD-SCNC: 141 MEQ/L (ref 135–145)
WBC # BLD: 6.5 THOU/MM3 (ref 4.8–10.8)

## 2022-08-30 PROCEDURE — 85027 COMPLETE CBC AUTOMATED: CPT

## 2022-08-30 PROCEDURE — 36415 COLL VENOUS BLD VENIPUNCTURE: CPT

## 2022-08-30 PROCEDURE — 85730 THROMBOPLASTIN TIME PARTIAL: CPT

## 2022-08-30 PROCEDURE — 85610 PROTHROMBIN TIME: CPT

## 2022-08-30 PROCEDURE — 82565 ASSAY OF CREATININE: CPT

## 2022-08-30 PROCEDURE — 84520 ASSAY OF UREA NITROGEN: CPT

## 2022-08-30 PROCEDURE — 82947 ASSAY GLUCOSE BLOOD QUANT: CPT

## 2022-08-30 PROCEDURE — 80051 ELECTROLYTE PANEL: CPT

## 2022-09-02 ENCOUNTER — TELEPHONE (OUTPATIENT)
Dept: FAMILY MEDICINE CLINIC | Age: 72
End: 2022-09-02

## 2022-09-02 NOTE — TELEPHONE ENCOUNTER
Tried to call the [facility] pt center to let them know that pt was cleared. Noone had pt on the schedule.  Do not have a number to go off on?

## 2022-09-02 NOTE — TELEPHONE ENCOUNTER
[address] [facility] [address] called, patient is scheduled for surgery Tuesday am and they need this Pre Op signed today. They do have access to Epic.

## 2022-12-01 ENCOUNTER — HOSPITAL ENCOUNTER (OUTPATIENT)
Dept: PHYSICAL THERAPY | Age: 72
Setting detail: THERAPIES SERIES
Discharge: HOME | End: 2022-12-01
Payer: COMMERCIAL

## 2022-12-01 PROCEDURE — 97110 THERAPEUTIC EXERCISES: CPT

## 2022-12-01 PROCEDURE — 97162 PT EVAL MOD COMPLEX 30 MIN: CPT

## 2022-12-01 NOTE — PROGRESS NOTES
** PLEASE SIGN, DATE AND TIME CERTIFICATION BELOW AND RETURN TO [facility] OUTPATIENT REHABILITATION (FAX #: [phone]). ATTEST/CO-SIGN IF ACCESSING VIA [employer]. THANK YOU.**    I certify that I have examined the patient below and determined that Physical Medicine and Rehabilitation service is necessary and that I approve the established plan of care for up to 90 days or as specifically noted. Attestation, signature or co-signature of physician indicates approval of certification requirements.    ________________________ ____________ __________  Physician Signature   Date   Time  [address] [facility]  PHYSICAL THERAPY  [x] EVALUATION  [] DAILY NOTE (LAND) [] DAILY NOTE (AQUATIC ) [] PROGRESS NOTE [] DISCHARGE NOTE    [] [facility]   [] [address]    [] [address] [facility]   [x] Pérez Veloz    Date: 2022  Patient Name:  Gutierrez Albarran  : 1950  MRN: [id number]  CSN: [id number]    Referring Practitioner Judy Dutta PA-C   Diagnosis Spinal stenosis, lumbar region with neurogenic claudication [M48.062]    Treatment Diagnosis Low back pain; difficulty walking   Date of Evaluation 22    Additional Pertinent History HTN; high cholesterol      Functional Outcome Measure Used Modified Oswestry   Functional Outcome Score 21/50 or 42% disability  (22)       Insurance: Primary: Payor: Judith Chan /  /  / ,   Secondary: MEDICARE   Authorization Information: Pre-certification is not needed    Visit # 1, 1/10 for progress note   Visits Allowed: Based on medical necessity   Recertification Date:    Physician Follow-Up:    Physician Orders:    History of Present Illness: Aaron Tirado is referred to PT s/p L4/L5 fusion, laminectomy and disc replacement on . She was recently discharged from the hard brace and she is currently wearing her soft brace. Since surgery she has struggled a bit.  She had a terrible, sharp pain throughout her right side for the first 6 weeks, however it has since improved. She did aggravate her pain this last weekend with increase in walking, standing and activity in general. She had mild burning down her left hip which her PCP attributes to hip bursitis and not her back. SUBJECTIVE: Aggravating factors walking/standing for 20 min; lifting/bending. Alleviating factors include ibproufen; ice and the brace. Towards the end of the evaluation today, Sterling Rivas mentioned that she experienced a spinning sensation when she rolled over in bed to the right side     Social/Functional History and Current Status:  Medications and Allergies have been reviewed and are listed on Medical History Questionnaire. Ramírez German lives with spouse in a multiple floor home with ability to complete ADL's on main floor with stairs and no handrail to enter. Task Previous Current   ADLs  Independent Assistance Required   IADL's Independent Independent   Ambulation Independent Modified Independent   Transfers Independent Independent   Recreation Independent Dependent/Unable   Community Integration Independent Independent   Driving Active  Active    Work Retired  Retired     OBJECTIVE:    Pain: 5/10 when seated    Palpation    Observation Wearing soft back brace    Posture Upon standing she tends to shift her weight forward and has exaggerated forward trunk lean       Range of Motion Not assessed due to fusion; no bending, lifting, twisting   Strength LE strength: right LE is mildly weaker, 4/5 with all   Bed Mobility Able to bridge up to scoot up in bed   Transfers Demonstrates a log roll with supine to sit transfer   Ambulation Walks with mildly shorter stride length on right side   Stairs Ascend/descend with reciprocal gait and 1 UE support    Balance Standing balance, feet together, eyes open/closed: good   Special Tests SLR is limited by mild hamstring tightness versus radicular pain.          TREATMENT Precautions:    Pain:     \"X in shaded column indicates activity completed today    *\" next to exercise/intervention indicates progression   Modalities Parameters/  Location  Notes                     Manual Therapy Time/Technique  Notes                     Exercise/  Intervention   Notes   TA activation; glute squeezes  10x 5 sec x    LTR 5x 10 sec x                                                                     Specific Interventions Next Treatment: NuStep; check her for BPPV; review HEP; progress DLS as tolerated     Activity/Treatment Tolerance:  [x]  Patient tolerated treatment well  []  Patient limited by fatigue  []  Patient limited by pain   []  Patient limited by medical complications  []  Other:     Assessment: Ramsey Ro is a monalisa 81 yr old woman referred to PT 3 months s/p lumbar fusion, discectomy and laminectomy. Today's evaluation indicated deficits in LE and core strength, increase in pain and mild limitations in balance and gait secondary to recent surgery. She will greatly benefit from PT to establish a dynamic lumbar stabilization program to provide greater internal support to her lumbar spine. Therapy will also help to develop a functional strengthening program to address minor deficits in balance and gait. I will continue to monitor and assess/treat vertigo. Body Structures/Functions/Activity Limitations: impaired activity tolerance, impaired endurance, impaired muscle tone, impaired ROM, impaired strength, pain, and abnormal posture  Prognosis: good    GOALS:  Patient Goal: improve strength to wean out of brace; resolve vertigo    Short Term Goals:  Time Frame: 3 weeks   Ramsey Ro will demonstrate good abdominal stabilization with all transfers and exercises without needing any verbal cuing. Jesi's Modified DHAVAL will improve to <15 indicating moderate disability related to her back pain. Ramsey Ro will return to walking without any AD as her strength/stamina improves.   Ramsey Ro will be able to walk, stand for 40+ min while maintaining neutral spine allowing her to begin to do more around her home and community without limitation. Long Term Goals:  Time Frame: 6 weeks   Carissa Shannon will be discharged from PT with independent HEP to maintain all strength and ROM gains achieved in clinic. Jesi's Modified DHAVAL will improve to <10% indicating minimal to no disability related to her back pain. Carissa Shannon will be able to roll over in bed, transfer in/out of bed and bend over etc without triggering spinning/vertigo. Patient Education:   [x]  HEP/Education Completed: Plan of Care, Goals, discussed vertigo; review of [address] Access Code:  []  No new Education completed  []  Reviewed Prior HEP      [x]  Patient verbalized and/or demonstrated understanding of education provided. []  Patient unable to verbalize and/or demonstrate understanding of education provided. Will continue education. [x]  Barriers to learning: n/a    PLAN:  Treatment Recommendations: Strengthening, Range of Motion, Balance Training, Functional Mobility Training, Transfer Training, Endurance Training, Gait Training, Neuromuscular Re-education, Manual Therapy - Soft Tissue Mobilization, Manual Therapy - Joint Manipulation, Pain Management, Home Exercise Program, Patient Education, Vestibular Rehabilitation, and Modalities    [x]  Plan of care initiated. Plan to see patient 2 times per week for 6 weeks to address the treatment planned outlined above.   []  Continue with current plan of care  []  Modify plan of care as follows:    []  Hold pending physician visit  []  Discharge    Time In 0905   Time Out 0955   Timed Code Minutes: 20 min   Total Treatment Time: 50 min       Electronically Signed by: Melanie Payne, TIMOTHY Guzman [id number]"Anderson Fontaine DPT  [id number]

## 2022-12-06 ENCOUNTER — TRANSCRIBE ORDERS (OUTPATIENT)
Dept: ADMINISTRATIVE | Age: 72
End: 2022-12-06

## 2022-12-06 DIAGNOSIS — E04.2 MULTINODULAR GOITER: Primary | ICD-10-CM

## 2022-12-08 ENCOUNTER — HOSPITAL ENCOUNTER (OUTPATIENT)
Dept: PHYSICAL THERAPY | Age: 72
Setting detail: THERAPIES SERIES
Discharge: HOME | End: 2022-12-08
Payer: COMMERCIAL

## 2022-12-08 PROCEDURE — 97112 NEUROMUSCULAR REEDUCATION: CPT

## 2022-12-08 PROCEDURE — 97110 THERAPEUTIC EXERCISES: CPT

## 2022-12-08 NOTE — PROGRESS NOTES
[address] [facility]  PHYSICAL THERAPY  [] EVALUATION  [x] DAILY NOTE (LAND) [] DAILY NOTE (AQUATIC ) [] PROGRESS NOTE [] DISCHARGE NOTE    [] [facility]   [] [address]    [] [address] [facility]   [x] Kenneth Jerry    Date: 2022  Patient Name:  Serene Marcano  : 1950  MRN: [id number]  CSN: [id number]    Referring Practitioner Aysha Marquez PA-C   Diagnosis Spinal stenosis, lumbar region with neurogenic claudication [M48.062]    Treatment Diagnosis Low back pain; difficulty walking   Date of Evaluation 22    Additional Pertinent History HTN; high cholesterol      Functional Outcome Measure Used Modified Oswestry   Functional Outcome Score 21/50 or 42% disability  (22)       Insurance: Primary: Payor: Belen Parker /  /  / ,   Secondary: MEDICARE   Authorization Information: Pre-certification is not needed    Visit # 2, 2/10 for progress note   Visits Allowed: Based on medical necessity   Recertification Date:    Physician Follow-Up:    Physician Orders:    History of Present Illness: Dillan Little is referred to PT s/p L4/L5 fusion, laminectomy and disc replacement on . She was recently discharged from the hard brace and she is currently wearing her soft brace. Since surgery she has struggled a bit. She had a terrible, sharp pain throughout her right side for the first 6 weeks, however it has since improved. She did aggravate her pain this last weekend with increase in walking, standing and activity in general. She had mild burning down her left hip which her PCP attributes to hip bursitis and not her back. SUBJECTIVE: Pt feels fusion has helped but it is too soon to tell. Pt feels evenings are when she fatigues most. Dizziness started a couple weeks ago. TREATMENT   Precautions:    Pain: 6-7/10 with walking.      \"X in shaded column indicates activity completed today    *\" next to exercise/intervention indicates progression   Modalities Parameters/  Location  Notes   Ice pack to LB during supine ex.   x                Manual Therapy Time/Technique  Notes                     Exercise/  Intervention   Notes   TA activation; glute squeezes  10x 5 sec x    LTR 5x 10 sec x    Nustep Level 5 5min.  x     Standing in //bars: heel toe raises, marching, mini squat, HS curls 10x  x    3 way hip 10x  x           Supine ex:        quad sets 10x  x    Heel slides 10x  x    SAQ 10x  x           Abdominal bracing marching 10x  x    LTR 10x  x    Sit to stand 10x  x    [place] -Hallpike L side  x L Upbeat nystagmus fatiguing in less than 30 sec. CRM to L  2x  x To tx L posterior canal canalithiasis   Pt educated in Post maneuver precautions   x      Specific Interventions Next Treatment: NuStep; check her for BPPV; review HEP; progress DLS as tolerated     Activity/Treatment Tolerance:  [x]  Patient tolerated treatment well  []  Patient limited by fatigue  []  Patient limited by pain   []  Patient limited by medical complications  []  Other:     Assessment: Initiated lumbar stabilization program this date. BPPV testing L posterier canal involvement with PT encouragement, demo and cuing for technique. Pt tolerated session well. Pt is guarded with movements to protect back. Body Structures/Functions/Activity Limitations: impaired activity tolerance, impaired endurance, impaired muscle tone, impaired ROM, impaired strength, pain, and abnormal posture  Prognosis: good    GOALS:  Patient Goal: improve strength to wean out of brace; resolve vertigo    Short Term Goals:  Time Frame: 3 weeks   Vandana Ego will demonstrate good abdominal stabilization with all transfers and exercises without needing any verbal cuing. Jesi's Modified DHAVAL will improve to <15 indicating moderate disability related to her back pain. Vandana Ego will return to walking without any AD as her strength/stamina improves.   Vandana Ego will be able to walk, stand for 40+ min while maintaining neutral spine allowing her to begin to do more around her home and community without limitation. Long Term Goals:  Time Frame: 6 weeks   Denisa Wolfe will be discharged from PT with independent HEP to maintain all strength and ROM gains achieved in clinic. Jesi's Modified DHAVAL will improve to <10% indicating minimal to no disability related to her back pain. Denisa Wolfe will be able to roll over in bed, transfer in/out of bed and bend over etc without triggering spinning/vertigo. Patient Education:   [x]  HEP/Education Completed: Plan of Care, Goals, discussed vertigo; review of SSC, precautions post manuever  Travis Jeff Access Code:  []  No new Education completed  []  Reviewed Prior HEP      [x]  Patient verbalized and/or demonstrated understanding of education provided. []  Patient unable to verbalize and/or demonstrate understanding of education provided. Will continue education. [x]  Barriers to learning: n/a    PLAN:  Treatment Recommendations: Strengthening, Range of Motion, Balance Training, Functional Mobility Training, Transfer Training, Endurance Training, Gait Training, Neuromuscular Re-education, Manual Therapy - Soft Tissue Mobilization, Manual Therapy - Joint Manipulation, Pain Management, Home Exercise Program, Patient Education, Vestibular Rehabilitation, and Modalities    []  Plan of care initiated. Plan to see patient 2 times per week for 6 weeks to address the treatment planned outlined above.   [x]  Continue with current plan of care  []  Modify plan of care as follows:    []  Hold pending physician visit  []  Discharge    Time In 1445   Time Out 1530   Timed Code Minutes: 45 min   Total Treatment Time: 45 min       Electronically Signed by: Lissy Maher PTA

## 2022-12-09 ENCOUNTER — HOSPITAL ENCOUNTER (OUTPATIENT)
Dept: ULTRASOUND IMAGING | Age: 72
Discharge: HOME | End: 2022-12-09
Payer: MEDICARE

## 2022-12-09 DIAGNOSIS — E04.2 MULTINODULAR GOITER: ICD-10-CM

## 2022-12-09 PROCEDURE — 76536 US EXAM OF HEAD AND NECK: CPT

## 2022-12-12 ENCOUNTER — TELEPHONE (OUTPATIENT)
Dept: FAMILY MEDICINE CLINIC | Age: 72
End: 2022-12-12

## 2022-12-12 NOTE — TELEPHONE ENCOUNTER
Pt has been feeling dizzy a lot. She went to a doctor in [address] after back surgery. 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 Pt would like to know if Dr Amparo Ward would be able to have or should not have.      AS

## 2022-12-13 ENCOUNTER — HOSPITAL ENCOUNTER (OUTPATIENT)
Dept: PHYSICAL THERAPY | Age: 72
Setting detail: THERAPIES SERIES
Discharge: HOME | End: 2022-12-13
Payer: COMMERCIAL

## 2022-12-13 PROCEDURE — 97112 NEUROMUSCULAR REEDUCATION: CPT

## 2022-12-13 PROCEDURE — 97530 THERAPEUTIC ACTIVITIES: CPT

## 2022-12-13 PROCEDURE — 97110 THERAPEUTIC EXERCISES: CPT

## 2022-12-13 RX ORDER — MECLIZINE HCL 12.5 MG/1
12.5 TABLET ORAL 3 TIMES DAILY PRN
Qty: 15 TABLET | Refills: 0 | Status: SHIPPED | OUTPATIENT
Start: 2022-12-13 | End: 2022-12-23

## 2022-12-13 NOTE — PROGRESS NOTES
[address] [facility]  PHYSICAL THERAPY  [] EVALUATION  [x] DAILY NOTE (LAND) [] DAILY NOTE (AQUATIC ) [] PROGRESS NOTE [] DISCHARGE NOTE    [] [facility]   [] Jesus [address]    [] [address] [facility]   [x] Kenneth Jerry    Date: 2022  Patient Name:  Serene Marcano  : 1950  MRN: [id number]  CSN: [id number]    Referring Practitioner Aysha Marquez PA-C   Diagnosis Spinal stenosis, lumbar region with neurogenic claudication [M48.062]    Treatment Diagnosis Low back pain; difficulty walking   Date of Evaluation 22    Additional Pertinent History HTN; high cholesterol      Functional Outcome Measure Used Modified Oswestry   Functional Outcome Score 21/50 or 42% disability  (22)       Insurance: Primary: Payor: Belen Parker /  /  / ,   Secondary: MEDICARE   Authorization Information: Pre-certification is not needed    Visit # 3, 3/10 for progress note   Visits Allowed: Based on medical necessity   Recertification Date:    Physician Follow-Up:    Physician Orders:    History of Present Illness: Dillan Little is referred to PT s/p L4/L5 fusion, laminectomy and disc replacement on . She was recently discharged from the hard brace and she is currently wearing her soft brace. Since surgery she has struggled a bit. She had a terrible, sharp pain throughout her right side for the first 6 weeks, however it has since improved. She did aggravate her pain this last weekend with increase in walking, standing and activity in general. She had mild burning down her left hip which her PCP attributes to hip bursitis and not her back. SUBJECTIVE: Feeling much better, dizziness speaking. Back is feeling pretty good. She is still on prednisone for her ear, and that is likely helping with the back pain. TREATMENT   Precautions:    Pain: 6-7/10 with walking.      \"X in shaded column indicates activity completed today    *\" next to exercise/intervention indicates progression   Modalities Parameters/  Location  Notes   Ice pack to LB during supine ex.   x                Manual Therapy Time/Technique  Notes                     Exercise/  Intervention   Notes   TA activation; glute squeezes  10x 5 sec x    LTR 5x 10 sec x    Nustep Level 5 6 min.  x     Standing in //bars: heel toe raises, marching, mini squat, HS curls 10x  x On airex   3 way hip 10x  x    // bars: forward march, side stepping, tandem 2x  x    Supine ex:        quad sets 10x      Heel slides 10x      SAQ 10x      Hip abd; add 10x Green band  x    Abdominal bracing marching 10x  x    LTR 10x  x    Sit to stand 10x  x    [place] -Hallpike; roll test  L side  x Negative in all directions     CRM to L  2x   To tx L posterior canal canalithiasis   Pt educated in Post maneuver precautions         Specific Interventions Next Treatment: NuStep; check her for BPPV; review HEP; progress DLS as tolerated     Activity/Treatment Tolerance:  [x]  Patient tolerated treatment well  []  Patient limited by fatigue  []  Patient limited by pain   []  Patient limited by medical complications  []  Other:     Assessment: Re-checked for BPPV; all canals are clear and negative for BPPV. Progressed DLS; struggled to perform a bridge secondary to core weakness. Body Structures/Functions/Activity Limitations: impaired activity tolerance, impaired endurance, impaired muscle tone, impaired ROM, impaired strength, pain, and abnormal posture  Prognosis: good    GOALS:  Patient Goal: improve strength to wean out of brace; resolve vertigo    Short Term Goals:  Time Frame: 3 weeks   Hector Benitez will demonstrate good abdominal stabilization with all transfers and exercises without needing any verbal cuing. Jesi's Modified DHAVAL will improve to <15 indicating moderate disability related to her back pain. Hector Benitez will return to walking without any AD as her strength/stamina improves.   Hector Benitez will be able to walk, stand for 40+ min while maintaining neutral spine allowing her to begin to do more around her home and community without limitation. Long Term Goals:  Time Frame: 6 weeks   Semaj Boyle will be discharged from PT with independent HEP to maintain all strength and ROM gains achieved in clinic. Jesi's Modified DHAVAL will improve to <10% indicating minimal to no disability related to her back pain. Semaj Boyle will be able to roll over in bed, transfer in/out of bed and bend over etc without triggering spinning/vertigo. Patient Education:   [x]  HEP/Education Completed: Plan of Care, Goals, discussed vertigo; review of SSC, precautions post manuever  [address] Access Code:  []  No new Education completed  []  Reviewed Prior HEP      [x]  Patient verbalized and/or demonstrated understanding of education provided. []  Patient unable to verbalize and/or demonstrate understanding of education provided. Will continue education. [x]  Barriers to learning: n/a    PLAN:  Treatment Recommendations: Strengthening, Range of Motion, Balance Training, Functional Mobility Training, Transfer Training, Endurance Training, Gait Training, Neuromuscular Re-education, Manual Therapy - Soft Tissue Mobilization, Manual Therapy - Joint Manipulation, Pain Management, Home Exercise Program, Patient Education, Vestibular Rehabilitation, and Modalities    []  Plan of care initiated. Plan to see patient 2 times per week for 6 weeks to address the treatment planned outlined above.   [x]  Continue with current plan of care  []  Modify plan of care as follows:    []  Hold pending physician visit  []  Discharge    Time In 1428   Time Out 1510   Timed Code Minutes: 42 min   Total Treatment Time: 42 min       Electronically Signed by: Les Figueroa, TIMOTHY Guzman [id number]"Vera Pederson DPT  [id number]

## 2022-12-13 NOTE — TELEPHONE ENCOUNTER
I called pt back, she is asking if she could   have an RX for Meclizine. Pt informed me she has been having some vertigo since her back surgery but has now started Vestibular Therapy and is on her second treatment today. Pt would like to have this RX on hand in case she has more dizzy spells.     [employer]

## 2022-12-15 ENCOUNTER — HOSPITAL ENCOUNTER (OUTPATIENT)
Dept: PHYSICAL THERAPY | Age: 72
Setting detail: THERAPIES SERIES
Discharge: HOME | End: 2022-12-15
Payer: COMMERCIAL

## 2022-12-15 PROCEDURE — 97530 THERAPEUTIC ACTIVITIES: CPT

## 2022-12-15 PROCEDURE — 97110 THERAPEUTIC EXERCISES: CPT

## 2022-12-15 NOTE — PROGRESS NOTES
[address] [facility]  PHYSICAL THERAPY  [] EVALUATION  [x] DAILY NOTE (LAND) [] DAILY NOTE (AQUATIC ) [] PROGRESS NOTE [] DISCHARGE NOTE    [] 615 [facility]   [] Jesus 90    [] [address] [facility]   [x] Haroldo Szymanski    Date: 12/15/2022  Patient Name:  Srinivasan Vizcaino  : 1950  MRN: [id number]  CSN: [id number]    Referring Practitioner Hector Espinal PA-C   Diagnosis Spinal stenosis, lumbar region with neurogenic claudication [M48.062]    Treatment Diagnosis Low back pain; difficulty walking   Date of Evaluation 22    Additional Pertinent History HTN; high cholesterol      Functional Outcome Measure Used Modified Oswestry   Functional Outcome Score 21/50 or 42% disability  (22)       Insurance: Primary: Payor: Christy Risk /  /  / ,   Secondary: MEDICARE   Authorization Information: Pre-certification is not needed    Visit # 4, /10 for progress note   Visits Allowed: Based on medical necessity   Recertification Date:    Physician Follow-Up:    Physician Orders:    History of Present Illness: Raoul Zhu is referred to PT s/p L4/L5 fusion, laminectomy and disc replacement on . She was recently discharged from the hard brace and she is currently wearing her soft brace. Since surgery she has struggled a bit. She had a terrible, sharp pain throughout her right side for the first 6 weeks, however it has since improved. She did aggravate her pain this last weekend with increase in walking, standing and activity in general. She had mild burning down her left hip which her PCP attributes to hip bursitis and not her back. SUBJECTIVE: Reports having a very active day yesterday and recovering today.         TREATMENT   Precautions:    Pain:3/10 LB    \"X in shaded column indicates activity completed today    *\" next to exercise/intervention indicates progression   Modalities Parameters/  Location  Notes   Ice pack to LB during supine ex.   x                Manual Therapy Time/Technique  Notes                     Exercise/  Intervention   Notes   TA activation; glute squeezes  10x 5 sec x    LTR 5x 10 sec x    Nustep Level 5 6 min.  x     Standing in //bars: heel toe raises, marching, mini squat, HS curls 10x  x On airex   3 way hip 10x  x    // bars: forward march, side stepping, tandem 2x  x    Supine ex:        quad sets 10x      Heel slides 10x      SAQ 10x      Hip abd; add 10x Green band  x    Abdominal bracing marching 10x  x    LTR 10x  x    Sit to stand 10x  x    [place] -Hallpike; roll test  L side   Negative in all directions     CRM to L  2x   To tx L posterior canal canalithiasis   Pt educated in Post maneuver precautions         Specific Interventions Next Treatment: NuStep; check her for BPPV; review HEP; progress DLS as tolerated     Activity/Treatment Tolerance:  [x]  Patient tolerated treatment well  []  Patient limited by fatigue  []  Patient limited by pain   []  Patient limited by medical complications  []  Other:     Assessment: Tolerated there ex well. Body Structures/Functions/Activity Limitations: impaired activity tolerance, impaired endurance, impaired muscle tone, impaired ROM, impaired strength, pain, and abnormal posture  Prognosis: good    GOALS:  Patient Goal: improve strength to wean out of brace; resolve vertigo    Short Term Goals:  Time Frame: 3 weeks   Nirav Murillo will demonstrate good abdominal stabilization with all transfers and exercises without needing any verbal cuing. Jesi's Modified DHAVAL will improve to <15 indicating moderate disability related to her back pain. Nirav Murillo will return to walking without any AD as her strength/stamina improves. Nirav Murillo will be able to walk, stand for 40+ min while maintaining neutral spine allowing her to begin to do more around her home and community without limitation.       Long Term Goals:  Time Frame: 6 weeks   Nirav Murillo will be discharged from PT with independent HEP to maintain all strength and ROM gains achieved in clinic. Jesi's Modified DHAVAL will improve to <10% indicating minimal to no disability related to her back pain. Leslie Dodson will be able to roll over in bed, transfer in/out of bed and bend over etc without triggering spinning/vertigo. Patient Education:   [x]  HEP/Education Completed: Plan of Care, Goals, discussed vertigo; review of SSC, precautions post manuever  [address] Access Code:  []  No new Education completed  []  Reviewed Prior HEP      [x]  Patient verbalized and/or demonstrated understanding of education provided. []  Patient unable to verbalize and/or demonstrate understanding of education provided. Will continue education. [x]  Barriers to learning: n/a    PLAN:  Treatment Recommendations: Strengthening, Range of Motion, Balance Training, Functional Mobility Training, Transfer Training, Endurance Training, Gait Training, Neuromuscular Re-education, Manual Therapy - Soft Tissue Mobilization, Manual Therapy - Joint Manipulation, Pain Management, Home Exercise Program, Patient Education, Vestibular Rehabilitation, and Modalities    []  Plan of care initiated. Plan to see patient 2 times per week for 6 weeks to address the treatment planned outlined above.   [x]  Continue with current plan of care  []  Modify plan of care as follows:    []  Hold pending physician visit  []  Discharge    Time In 1428   Time Out 1508   Timed Code Minutes: 40 min   Total Treatment Time: 40 min       Electronically Signed by: Jl Linares, PTA [id number]

## 2022-12-19 ENCOUNTER — HOSPITAL ENCOUNTER (OUTPATIENT)
Dept: PHYSICAL THERAPY | Age: 72
Setting detail: THERAPIES SERIES
Discharge: HOME | End: 2022-12-19
Payer: COMMERCIAL

## 2022-12-19 PROCEDURE — 97110 THERAPEUTIC EXERCISES: CPT

## 2022-12-19 NOTE — PROGRESS NOTES
[address] [facility]  PHYSICAL THERAPY  [] EVALUATION  [x] DAILY NOTE (LAND) [] DAILY NOTE (AQUATIC ) [] PROGRESS NOTE [] DISCHARGE NOTE    [] OUTPATIENT REHABILITATION [facility]   [] Jesus     [] [address] [facility]   [x] Toney Whitehead    Date: 2022  Patient Name:  Leyda Jacobs  : 1950  MRN: [id number]  CSN: [id number]    Referring Practitioner Tomas Sarabia PA-C   Diagnosis Spinal stenosis, lumbar region with neurogenic claudication [M48.062]    Treatment Diagnosis Low back pain; difficulty walking   Date of Evaluation 22    Additional Pertinent History HTN; high cholesterol      Functional Outcome Measure Used Modified Oswestry   Functional Outcome Score 21/50 or 42% disability  (22)       Insurance: Primary: Payor: Nigel Sands /  /  / ,   Secondary: MEDICARE   Authorization Information: Pre-certification is not needed    Visit # 5, 5/10 for progress note   Visits Allowed: Based on medical necessity   Recertification Date:    Physician Follow-Up:    Physician Orders:    History of Present Illness: Taina Burdick is referred to PT s/p L4/L5 fusion, laminectomy and disc replacement on . She was recently discharged from the hard brace and she is currently wearing her soft brace. Since surgery she has struggled a bit. She had a terrible, sharp pain throughout her right side for the first 6 weeks, however it has since improved. She did aggravate her pain this last weekend with increase in walking, standing and activity in general. She had mild burning down her left hip which her PCP attributes to hip bursitis and not her back. SUBJECTIVE: Pt has not been wearing brace and she has noticed a little more soreness in the mornings.         TREATMENT   Precautions:    Pain:3-4/10 LB    \"X in shaded column indicates activity completed today    *\" next to exercise/intervention indicates progression   Modalities Parameters/  Location  Notes   Ice pack to LB during supine ex.   x                Manual Therapy Time/Technique  Notes                     Exercise/  Intervention   Notes   TA activation; glute squeezes  10x 5 sec x    LTR 5x 10 sec x    Nustep Level 5 6 min.  x     Standing in //bars: heel toe raises, marching, mini squat, HS curls 15x  x On airex   3 way hip 15x  x    // bars: forward march, side stepping, tandem, retro 2x  x    Supine ex:        quad sets 15x  x    Heel slides 15x  x    SAQ 15x  x    Hip abd; add 15x Green band  x    Abdominal bracing marching 15x  x    LTR 10x  x    Sit to stand 10x  x    [place] -Hallpike; roll test  L side   Negative in all directions     CRM to L  2x   To tx L posterior canal canalithiasis   Pt educated in Post maneuver precautions         Specific Interventions Next Treatment: NuStep; check her for BPPV; review HEP; progress DLS as tolerated     Activity/Treatment Tolerance:  [x]  Patient tolerated treatment well  []  Patient limited by fatigue  []  Patient limited by pain   []  Patient limited by medical complications  []  Other:     Assessment: Increased reps this date with good tolerance. Pt continues to work on stabilization ex. Body Structures/Functions/Activity Limitations: impaired activity tolerance, impaired endurance, impaired muscle tone, impaired ROM, impaired strength, pain, and abnormal posture  Prognosis: good    GOALS:  Patient Goal: improve strength to wean out of brace; resolve vertigo    Short Term Goals:  Time Frame: 3 weeks   Doc Roe will demonstrate good abdominal stabilization with all transfers and exercises without needing any verbal cuing. Jesi's Modified DHAVAL will improve to <15 indicating moderate disability related to her back pain. Doc Roe will return to walking without any AD as her strength/stamina improves.   Doc Roe will be able to walk, stand for 40+ min while maintaining neutral spine allowing her to begin to do more around her home and community without limitation. Long Term Goals:  Time Frame: 6 weeks   Taina Burdick will be discharged from PT with independent HEP to maintain all strength and ROM gains achieved in clinic. Jesi's Modified DHAVAL will improve to <10% indicating minimal to no disability related to her back pain. Taina Burdick will be able to roll over in bed, transfer in/out of bed and bend over etc without triggering spinning/vertigo. Patient Education:   []  HEP/Education Completed: Plan of Care, Goals, discussed vertigo; review of SSC, precautions post manuever  [address] Access Code:  []  No new Education completed  [x]  Reviewed Prior HEP      [x]  Patient verbalized and/or demonstrated understanding of education provided. []  Patient unable to verbalize and/or demonstrate understanding of education provided. Will continue education. [x]  Barriers to learning: n/a    PLAN:  Treatment Recommendations: Strengthening, Range of Motion, Balance Training, Functional Mobility Training, Transfer Training, Endurance Training, Gait Training, Neuromuscular Re-education, Manual Therapy - Soft Tissue Mobilization, Manual Therapy - Joint Manipulation, Pain Management, Home Exercise Program, Patient Education, Vestibular Rehabilitation, and Modalities    []  Plan of care initiated. Plan to see patient 2 times per week for 6 weeks to address the treatment planned outlined above.   [x]  Continue with current plan of care  []  Modify plan of care as follows:    []  Hold pending physician visit  []  Discharge    Time In 0800   Time Out 0840   Timed Code Minutes: 40 min   Total Treatment Time: 40 min       Electronically Signed by: Chrissy Oconnor PTA

## 2022-12-20 ENCOUNTER — HOSPITAL ENCOUNTER (OUTPATIENT)
Dept: PHYSICAL THERAPY | Age: 72
Setting detail: THERAPIES SERIES
End: 2022-12-20
Payer: COMMERCIAL

## 2022-12-22 ENCOUNTER — HOSPITAL ENCOUNTER (OUTPATIENT)
Dept: PHYSICAL THERAPY | Age: 72
Setting detail: THERAPIES SERIES
Discharge: HOME | End: 2022-12-22
Payer: COMMERCIAL

## 2022-12-22 PROCEDURE — 97110 THERAPEUTIC EXERCISES: CPT

## 2022-12-22 PROCEDURE — 97530 THERAPEUTIC ACTIVITIES: CPT

## 2022-12-22 NOTE — PROGRESS NOTES
[facility]  PHYSICAL THERAPY  [] EVALUATION  [x] DAILY NOTE (LAND) [] DAILY NOTE (AQUATIC ) [] PROGRESS NOTE [] DISCHARGE NOTE    [] [facility]   [] Jesus     [] [address] [place]   [x] Alina Aggarwal    Date: 2022  Patient Name:  Madelin Muniz  : 1950  MRN: [id number]  CSN: [id number]    Referring Practitioner Janessa Glasgow.YAZAN   Diagnosis Spinal stenosis, lumbar region with neurogenic claudication [M48.062]    Treatment Diagnosis Low back pain; difficulty walking   Date of Evaluation 22    Additional Pertinent History HTN; high cholesterol      Functional Outcome Measure Used Modified Oswestry   Functional Outcome Score 21/50 or 42% disability  (22)       Insurance: Primary: Payor: Wu Cruz /  /  / ,   Secondary: MEDICARE   Authorization Information: Pre-certification is not needed    Visit # 6, 6/10 for progress note   Visits Allowed: Based on medical necessity   Recertification Date:    Physician Follow-Up:    Physician Orders:    History of Present Illness: Nick Bustillos is referred to PT s/p L4/L5 fusion, laminectomy and disc replacement on . She was recently discharged from the hard brace and she is currently wearing her soft brace. Since surgery she has struggled a bit. She had a terrible, sharp pain throughout her right side for the first 6 weeks, however it has since improved. She did aggravate her pain this last weekend with increase in walking, standing and activity in general. She had mild burning down her left hip which her PCP attributes to hip bursitis and not her back. SUBJECTIVE: Vertigo is good; she is weaning out of her brace.  Notices that the pain increases as the day progresses       TREATMENT   Precautions:    Pain:3-4/10 LB    \"X in shaded column indicates activity completed today    *\" next to exercise/intervention indicates progression   Modalities Parameters/  Location  Notes   Ice pack to LB during supine ex.   x                Manual Therapy Time/Technique  Notes                     Exercise/  Intervention   Notes   TA activation; glute squeezes  10x 5 sec x    LTR 5x 10 sec x    Nustep Level 5 6 min.  x     Standing in //bars: heel toe raises, marching, mini squat, HS curls 15x  x On airex   3 way hip 15x  x    // bars: forward march, side stepping, tandem, retro 2x  x    Supine ex:        quad sets 15x  x    Heel slides 15x      SLR 15x  x                  SAQ 15x      Hip abd; add 15x Green band  x    Abdominal bracing marching 15x  x    LTR 10x  x    Sit to stand 10x  x    [place] -Hallpike; roll test  L side   Negative in all directions     CRM to L  2x   To tx L posterior canal canalithiasis   Pt educated in Post maneuver precautions         Specific Interventions Next Treatment: NuStep; check her for BPPV; review HEP; progress DLS as tolerated     Activity/Treatment Tolerance:  [x]  Patient tolerated treatment well  []  Patient limited by fatigue  []  Patient limited by pain   []  Patient limited by medical complications  []  Other:     Assessment: Added SLR today; continues to demonstrate good stabilization with all exercises. Advised her to continue to wear her brace on days where her back is fatigued. Body Structures/Functions/Activity Limitations: impaired activity tolerance, impaired endurance, impaired muscle tone, impaired ROM, impaired strength, pain, and abnormal posture  Prognosis: good    GOALS:  Patient Goal: improve strength to wean out of brace; resolve vertigo    Short Term Goals:  Time Frame: 3 weeks   Nicole Vasquez will demonstrate good abdominal stabilization with all transfers and exercises without needing any verbal cuing. Jesi's Modified DHAVAL will improve to <15 indicating moderate disability related to her back pain. Nicole Vasquez will return to walking without any AD as her strength/stamina improves.   Nicole Vasquez will be able to walk, stand for 40+ min while maintaining neutral spine allowing her to begin to do more around her home and community without limitation. Long Term Goals:  Time Frame: 6 weeks   Carl Remy will be discharged from PT with independent HEP to maintain all strength and ROM gains achieved in clinic. Jesi's Modified DHAVAL will improve to <10% indicating minimal to no disability related to her back pain. Carl Remy will be able to roll over in bed, transfer in/out of bed and bend over etc without triggering spinning/vertigo. Patient Education:   []  HEP/Education Completed: Plan of Care, Goals, discussed vertigo; review of SSC, precautions post manuever  [address] Access Code:  []  No new Education completed  [x]  Reviewed Prior HEP      [x]  Patient verbalized and/or demonstrated understanding of education provided. []  Patient unable to verbalize and/or demonstrate understanding of education provided. Will continue education. [x]  Barriers to learning: n/a    PLAN:  Treatment Recommendations: Strengthening, Range of Motion, Balance Training, Functional Mobility Training, Transfer Training, Endurance Training, Gait Training, Neuromuscular Re-education, Manual Therapy - Soft Tissue Mobilization, Manual Therapy - Joint Manipulation, Pain Management, Home Exercise Program, Patient Education, Vestibular Rehabilitation, and Modalities    []  Plan of care initiated. Plan to see patient 2 times per week for 6 weeks to address the treatment planned outlined above.   [x]  Continue with current plan of care  []  Modify plan of care as follows:    []  Hold pending physician visit  []  Discharge    Time In 1345   Time Out 1418   Timed Code Minutes: 33 min   Total Treatment Time: 33 min       Electronically Signed by: Ana Paula Harmon, PT   Miguel Guzman [id number]"Miles Mello DPT  [id number]

## 2022-12-27 ENCOUNTER — TELEPHONE (OUTPATIENT)
Dept: PHYSICAL MEDICINE AND REHAB | Age: 72
End: 2022-12-27

## 2022-12-27 ENCOUNTER — APPOINTMENT (OUTPATIENT)
Dept: PHYSICAL THERAPY | Age: 72
End: 2022-12-27
Payer: COMMERCIAL

## 2022-12-27 NOTE — TELEPHONE ENCOUNTER
Pt.called and LVM stating she is having issues with coverage from insurance for an injection done in May. Please call to clarify.

## 2022-12-28 ENCOUNTER — HOSPITAL ENCOUNTER (OUTPATIENT)
Dept: PHYSICAL THERAPY | Age: 72
Setting detail: THERAPIES SERIES
Discharge: HOME | End: 2022-12-28
Payer: COMMERCIAL

## 2022-12-28 PROCEDURE — 97110 THERAPEUTIC EXERCISES: CPT

## 2022-12-28 NOTE — PROGRESS NOTES
[address] [facility]  PHYSICAL THERAPY  [] EVALUATION  [x] DAILY NOTE (LAND) [] DAILY NOTE (AQUATIC ) [] PROGRESS NOTE [] DISCHARGE NOTE    [] [facility]   [] Jesus [address]    [] [address] [facility]   [x] Seun Farfan    Date: 2022  Patient Name:  Michael Packer  : 1950  MRN: [id number]  CSN: [id number]    Referring Practitioner Real Hardin., YAZAN   Diagnosis Spinal stenosis, lumbar region with neurogenic claudication [M48.062]    Treatment Diagnosis Low back pain; difficulty walking   Date of Evaluation 22    Additional Pertinent History HTN; high cholesterol      Functional Outcome Measure Used Modified Oswestry   Functional Outcome Score 21/50 or 42% disability  (22)       Insurance: Primary: Payor: Kofi Odonnell /  /  / ,   Secondary: MEDICARE   Authorization Information: Pre-certification is not needed    Visit # 7, 7/10 for progress note   Visits Allowed: Based on medical necessity   Recertification Date:    Physician Follow-Up:    Physician Orders:    History of Present Illness: Vandana Escamilla is referred to PT s/p L4/L5 fusion, laminectomy and disc replacement on . She was recently discharged from the hard brace and she is currently wearing her soft brace. Since surgery she has struggled a bit. She had a terrible, sharp pain throughout her right side for the first 6 weeks, however it has since improved. She did aggravate her pain this last weekend with increase in walking, standing and activity in general. She had mild burning down her left hip which her PCP attributes to hip bursitis and not her back. SUBJECTIVE: Pt stated she continues to feel good since back surgery. Pt has weaned out of back brace.        TREATMENT   Precautions:    Pain:  3/10 LB    \"X in shaded column indicates activity completed today    *\" next to exercise/intervention indicates progression   Modalities Parameters/  Location  Notes   Ice pack to LB during supine ex.   x                Manual Therapy Time/Technique  Notes                     Exercise/  Intervention   Notes   TA activation; glute squeezes  10x 5 sec x    LTR 5x 10 sec x    Nustep Level 5 6 min.  x     Standing in //bars: heel toe raises, marching, mini squat, HS curls 15x  x On airex   3 way hip 15x  x    // bars: forward march, side stepping, tandem, retro 2x  x    Supine ex:        quad sets 15x  x    Heel slides 15x      SLR 15x  x                  SAQ 15x  x    Hip abd; add squeeze with ball 15x Green band  x    Abdominal bracing marching 15x  x    LTR 10x  x    Sit to stand 10x  x    [place] -Hallpike; roll test  L side   Negative in all directions     CRM to L  2x   To tx L posterior canal canalithiasis   Pt educated in Post maneuver precautions           Specific Interventions Next Treatment: NuStep; check her for BPPV; review HEP; progress DLS as tolerated     Activity/Treatment Tolerance:  [x]  Patient tolerated treatment well  []  Patient limited by fatigue  []  Patient limited by pain   []  Patient limited by medical complications  []  Other:     Assessment: Gladis Tompkins continues to work on stabilization ex with no increase in back pain levels. Pt questioning how to fix vertigo when her and her  go to [place] next week. PTA stated she would talk with PT about alternative treatments. Body Structures/Functions/Activity Limitations: impaired activity tolerance, impaired endurance, impaired muscle tone, impaired ROM, impaired strength, pain, and abnormal posture  Prognosis: good    GOALS:  Patient Goal: improve strength to wean out of brace; resolve vertigo    Short Term Goals:  Time Frame: 3 weeks   Gladis Tompkins will demonstrate good abdominal stabilization with all transfers and exercises without needing any verbal cuing. Jesi's Modified DHAVAL will improve to <15 indicating moderate disability related to her back pain.   Gladis Tompkins will return to walking without any AD as her strength/stamina improves. Kyung Baeza will be able to walk, stand for 40+ min while maintaining neutral spine allowing her to begin to do more around her home and community without limitation. Long Term Goals:  Time Frame: 6 weeks   Kyung Baeza will be discharged from PT with independent HEP to maintain all strength and ROM gains achieved in clinic. Jesi's Modified DHAVAL will improve to <10% indicating minimal to no disability related to her back pain. Kyung Baeza will be able to roll over in bed, transfer in/out of bed and bend over etc without triggering spinning/vertigo. Patient Education:   []  HEP/Education Completed: Plan of Care, Goals, discussed vertigo; review of SSC, precautions post manuever  [address] [place] [address] Access Code:  []  No new Education completed  [x]  Reviewed Prior HEP      [x]  Patient verbalized and/or demonstrated understanding of education provided. []  Patient unable to verbalize and/or demonstrate understanding of education provided. Will continue education. [x]  Barriers to learning: n/a    PLAN:  Treatment Recommendations: Strengthening, Range of Motion, Balance Training, Functional Mobility Training, Transfer Training, Endurance Training, Gait Training, Neuromuscular Re-education, Manual Therapy - Soft Tissue Mobilization, Manual Therapy - Joint Manipulation, Pain Management, Home Exercise Program, Patient Education, Vestibular Rehabilitation, and Modalities    []  Plan of care initiated. Plan to see patient 2 times per week for 6 weeks to address the treatment planned outlined above.   [x]  Continue with current plan of care  []  Modify plan of care as follows:    []  Hold pending physician visit  []  Discharge    Time In 1615   Time Out 1655   Timed Code Minutes: 40 min   Total Treatment Time: 40 min       Electronically Signed by: Dinesh Diggs PTA

## 2022-12-29 ENCOUNTER — HOSPITAL ENCOUNTER (OUTPATIENT)
Dept: PHYSICAL THERAPY | Age: 72
Setting detail: THERAPIES SERIES
Discharge: HOME | End: 2022-12-29
Payer: COMMERCIAL

## 2022-12-29 PROCEDURE — 97530 THERAPEUTIC ACTIVITIES: CPT

## 2022-12-29 PROCEDURE — 97110 THERAPEUTIC EXERCISES: CPT

## 2022-12-29 NOTE — DISCHARGE SUMMARY
[address] [facility]  PHYSICAL THERAPY  [] EVALUATION  [] DAILY NOTE (LAND) [] DAILY NOTE (AQUATIC ) [] PROGRESS NOTE [x] DISCHARGE NOTE    [] OUTPATIENT REHABILITATION CENTER [facility]   [] [place] [address]    [] [facility]   [x] Nelson Lopez    Date: 2022  Patient Name:  Dominic Dosdon  : 1950  MRN: [id number]  CSN: [id number]    Referring Practitioner Jacob Forbes PA-C   Diagnosis Spinal stenosis, lumbar region with neurogenic claudication [M48.062]    Treatment Diagnosis Low back pain; difficulty walking   Date of Evaluation 22    Additional Pertinent History HTN; high cholesterol      Functional Outcome Measure Used Modified Oswestry   Functional Outcome Score 21/50 or 42% disability  (22)   14/50 or 28% disability (22)      Insurance: Primary: Payor: Lenny Santana [id number] /  /  / ,   Secondary: MEDICARE   Authorization Information: Pre-certification is not needed    Visit # 8, 0/10 for progress note   Visits Allowed: Based on medical necessity   Recertification Date:    Physician Follow-Up:    Physician Orders:    History of Present Illness: Yariel Paulino is referred to PT s/p L4/L5 fusion, laminectomy and disc replacement on . She was recently discharged from the hard brace and she is currently wearing her soft brace. Since surgery she has struggled a bit. She had a terrible, sharp pain throughout her right side for the first 6 weeks, however it has since improved. She did aggravate her pain this last weekend with increase in walking, standing and activity in general. She had mild burning down her left hip which her PCP attributes to hip bursitis and not her back.      SUBJECTIVE: Little sore today; she was just in yesterday for PT       TREATMENT   Precautions:    Pain:  4/10 LB    \"X in shaded column indicates activity completed today    *\" next to exercise/intervention indicates progression   Modalities Parameters/  Location  Notes   Ice pack to LB during supine ex.   x                Manual Therapy Time/Technique  Notes                     Exercise/  Intervention   Notes   TA activation; glute squeezes  10x 5 sec     LTR 5x 10 sec     Nustep Level 5 6 min.  x     Standing in //bars: heel toe raises, marching, mini squat, HS curls 15x   On airex   3 way hip 15x      // bars: forward march, side stepping, tandem, retro 2x      Supine ex:        quad sets 15x      Heel slides 15x      SLR 15x      Modified DHAVAL; review of goals   x    Provided and reviewed home CRM with patient   x    SAQ 15x      Hip abd; add squeeze with ball 15x Green band      Abdominal bracing marching 15x      LTR 10x      Sit to stand 10x      [place] -Hallpike; roll test  L side   Negative in all directions     CRM to L  2x   To tx L posterior canal canalithiasis   Pt educated in Post maneuver precautions           Specific Interventions Next Treatment: NuStep; check her for BPPV; review HEP; progress DLS as tolerated     Activity/Treatment Tolerance:  [x]  Patient tolerated treatment well  []  Patient limited by fatigue  []  Patient limited by pain   []  Patient limited by medical complications  []  Other:     Assessment: Liana Francis is discharged from PT with independent HEP to maintain all strength and ROM gains achieved in clinic. She has made excellent improvements in strength, posture and her positional vertigo has resolved. Body Structures/Functions/Activity Limitations: impaired activity tolerance, impaired endurance, impaired muscle tone, impaired ROM, impaired strength, pain, and abnormal posture  Prognosis: good    GOALS:  Patient Goal: improve strength to wean out of brace; resolve vertigo    Short Term Goals:  Time Frame: 3 weeks   Liana Francis will demonstrate good abdominal stabilization with all transfers and exercises without needing any verbal cuing.  GOAL MET  Jesi's Modified DHAVAL will improve to <15 indicating moderate disability related to her back pain. GOAL MET   Debbie Gilmore will return to walking without any AD as her strength/stamina improves. GOAL MET   Debbie Gilmore will be able to walk, stand for 40+ min while maintaining neutral spine allowing her to begin to do more around her home and community without limitation. Long Term Goals:  Time Frame: 6 weeks   Debbie Gilmore will be discharged from PT with independent HEP to maintain all strength and ROM gains achieved in clinic. Jesi's Modified DHAVAL will improve to <10% indicating minimal to no disability related to her back pain. Debbie Gilmore will be able to roll over in bed, transfer in/out of bed and bend over etc without triggering spinning/vertigo. GOAL MET       Patient Education:   []  HEP/Education Completed: Plan of Care, Goals, discussed vertigo; review of SSC, precautions post manuever  [address] Access Code:  []  No new Education completed  [x]  Reviewed Prior HEP      [x]  Patient verbalized and/or demonstrated understanding of education provided. []  Patient unable to verbalize and/or demonstrate understanding of education provided. Will continue education. [x]  Barriers to learning: n/a    PLAN:  Treatment Recommendations: Strengthening, Range of Motion, Balance Training, Functional Mobility Training, Transfer Training, Endurance Training, Gait Training, Neuromuscular Re-education, Manual Therapy - Soft Tissue Mobilization, Manual Therapy - Joint Manipulation, Pain Management, Home Exercise Program, Patient Education, Vestibular Rehabilitation, and Modalities    []  Plan of care initiated. Plan to see patient 2 times per week for 6 weeks to address the treatment planned outlined above.   []  Continue with current plan of care  []  Modify plan of care as follows:    []  Hold pending physician visit  [x]  Discharge    Time In 1345   Time Out 1415   Timed Code Minutes: 30 min   Total Treatment Time: 30 min       Electronically Signed by: Nela Garza, PT   [facility] [address] [facility], [place]  [id number]

## 2023-01-12 NOTE — TELEPHONE ENCOUNTER
Left MOM for pt to return call to the office. Pharmacist Renal Dose Adjustment Note    Endy Bruno is a 48 y.o. male being treated with the medication ciprofloxacin    Patient Data:    Vital Signs (Most Recent):  Temp: 98.5 °F (36.9 °C) (01/12/23 1250)  Pulse: 100 (01/12/23 1250)  Resp: 18 (01/12/23 1250)  BP: 111/71 (01/12/23 1250)  SpO2: (!) 93 % (01/12/23 1250)   Vital Signs (72h Range):  Temp:  [96.2 °F (35.7 °C)-98.8 °F (37.1 °C)]   Pulse:  []   Resp:  [12-20]   BP: ()/(50-81)   SpO2:  [93 %-100 %]      Recent Labs   Lab 01/09/23  0916 01/10/23  0431 01/11/23  0315   CREATININE 0.7 0.8 1.6*     Serum creatinine: 1.6 mg/dL (H) 01/11/23 0315  Estimated creatinine clearance: 40.9 mL/min (A)    Ciprofloxacin 750 mg PO BID will be changed to ciprofloxacin 500 mg PO BID    Pharmacist's Name: Tang Whitt, PharmD, BCPS   Pharmacist's Extension: [phone]

## 2023-05-14 ENCOUNTER — APPOINTMENT (OUTPATIENT)
Dept: INTERVENTIONAL RADIOLOGY/VASCULAR | Age: 73
End: 2023-05-14
Payer: MEDICARE

## 2023-05-14 ENCOUNTER — HOSPITAL ENCOUNTER (OUTPATIENT)
Age: 73
Setting detail: OBSERVATION
Discharge: HOME | End: 2023-05-16
Attending: STUDENT IN AN ORGANIZED HEALTH CARE EDUCATION/TRAINING PROGRAM
Payer: MEDICARE

## 2023-05-14 ENCOUNTER — APPOINTMENT (OUTPATIENT)
Dept: GENERAL RADIOLOGY | Age: 73
End: 2023-05-14
Payer: MEDICARE

## 2023-05-14 DIAGNOSIS — R77.8 ELEVATED TROPONIN: ICD-10-CM

## 2023-05-14 DIAGNOSIS — R07.9 CHEST PAIN, UNSPECIFIED TYPE: Primary | ICD-10-CM

## 2023-05-14 DIAGNOSIS — U07.1 COVID: ICD-10-CM

## 2023-05-14 PROBLEM — R07.89 OTHER CHEST PAIN: Status: ACTIVE | Noted: 2023-05-14

## 2023-05-14 LAB
ALBUMIN SERPL BCG-MCNC: 4.1 G/DL (ref 3.5–5.1)
ALP SERPL-CCNC: 110 U/L (ref 38–126)
ALT SERPL W/O P-5'-P-CCNC: 20 U/L (ref 11–66)
ANION GAP SERPL CALC-SCNC: 14 MEQ/L (ref 8–16)
AST SERPL-CCNC: 21 U/L (ref 5–40)
BASOPHILS ABSOLUTE: 0 THOU/MM3 (ref 0–0.1)
BASOPHILS NFR BLD AUTO: 0.3 %
BILIRUB SERPL-MCNC: 0.4 MG/DL (ref 0.3–1.2)
BUN SERPL-MCNC: 31 MG/DL (ref 7–22)
CALCIUM SERPL-MCNC: 9.3 MG/DL (ref 8.5–10.5)
CHLORIDE SERPL-SCNC: 101 MEQ/L (ref 98–111)
CO2 SERPL-SCNC: 22 MEQ/L (ref 23–33)
CREAT SERPL-MCNC: 1.2 MG/DL (ref 0.4–1.2)
CRP SERPL-MCNC: 4.26 MG/DL (ref 0–1)
D DIMER PPP IA.FEU-MCNC: 1407 NG/ML FEU (ref 0–500)
DEPRECATED RDW RBC AUTO: 50 FL (ref 35–45)
EKG ATRIAL RATE: 89 BPM
EKG P AXIS: 28 DEGREES
EKG P-R INTERVAL: 144 MS
EKG Q-T INTERVAL: 342 MS
EKG QRS DURATION: 82 MS
EKG QTC CALCULATION (BAZETT): 416 MS
EKG R AXIS: -69 DEGREES
EKG T AXIS: 49 DEGREES
EKG VENTRICULAR RATE: 89 BPM
EOSINOPHIL NFR BLD AUTO: 2.3 %
EOSINOPHILS ABSOLUTE: 0.3 THOU/MM3 (ref 0–0.4)
ERYTHROCYTE [DISTWIDTH] IN BLOOD BY AUTOMATED COUNT: 14.3 % (ref 11.5–14.5)
FLUAV RNA RESP QL NAA+PROBE: NOT DETECTED
FLUBV RNA RESP QL NAA+PROBE: NOT DETECTED
GFR SERPL CREATININE-BSD FRML MDRD: 49 ML/MIN/1.73M2
GLUCOSE SERPL-MCNC: 162 MG/DL (ref 70–108)
HCT VFR BLD AUTO: 43.7 % (ref 37–47)
HGB BLD-MCNC: 14.3 GM/DL (ref 12–16)
IMM GRANULOCYTES # BLD AUTO: 0.04 THOU/MM3 (ref 0–0.07)
IMM GRANULOCYTES NFR BLD AUTO: 0.3 %
LYMPHOCYTES ABSOLUTE: 1.5 THOU/MM3 (ref 1–4.8)
LYMPHOCYTES NFR BLD AUTO: 13.2 %
MCH RBC QN AUTO: 31.4 PG (ref 26–33)
MCHC RBC AUTO-ENTMCNC: 32.7 GM/DL (ref 32.2–35.5)
MCV RBC AUTO: 96 FL (ref 81–99)
MONOCYTES ABSOLUTE: 1.1 THOU/MM3 (ref 0.4–1.3)
MONOCYTES NFR BLD AUTO: 9.4 %
NEUTROPHILS NFR BLD AUTO: 74.5 %
NRBC BLD AUTO-RTO: 0 /100 WBC
OSMOLALITY SERPL CALC.SUM OF ELEC: 283.9 MOSMOL/KG (ref 275–300)
PLATELET # BLD AUTO: 209 THOU/MM3 (ref 130–400)
PMV BLD AUTO: 10.3 FL (ref 9.4–12.4)
POTASSIUM SERPL-SCNC: 5 MEQ/L (ref 3.5–5.2)
PROCALCITONIN SERPL IA-MCNC: 0.22 NG/ML (ref 0.01–0.09)
PROT SERPL-MCNC: 6.9 G/DL (ref 6.1–8)
RBC # BLD AUTO: 4.55 MILL/MM3 (ref 4.2–5.4)
SARS-COV-2 RNA RESP QL NAA+PROBE: DETECTED
SEGMENTED NEUTROPHILS ABSOLUTE COUNT: 8.6 THOU/MM3 (ref 1.8–7.7)
SODIUM SERPL-SCNC: 137 MEQ/L (ref 135–145)
T4 FREE SERPL-MCNC: 1.45 NG/DL (ref 0.93–1.76)
TROPONIN T: 0.05 NG/ML
TROPONIN T: 0.1 NG/ML
TSH SERPL DL<=0.005 MIU/L-ACNC: 4.34 UIU/ML (ref 0.4–4.2)
WBC # BLD AUTO: 11.6 THOU/MM3 (ref 4.8–10.8)

## 2023-05-14 PROCEDURE — 99285 EMERGENCY DEPT VISIT HI MDM: CPT

## 2023-05-14 PROCEDURE — 80053 COMPREHEN METABOLIC PANEL: CPT

## 2023-05-14 PROCEDURE — 96361 HYDRATE IV INFUSION ADD-ON: CPT

## 2023-05-14 PROCEDURE — 93005 ELECTROCARDIOGRAM TRACING: CPT | Performed by: STUDENT IN AN ORGANIZED HEALTH CARE EDUCATION/TRAINING PROGRAM

## 2023-05-14 PROCEDURE — 36415 COLL VENOUS BLD VENIPUNCTURE: CPT

## 2023-05-14 PROCEDURE — 93970 EXTREMITY STUDY: CPT

## 2023-05-14 PROCEDURE — G0378 HOSPITAL OBSERVATION PER HR: HCPCS

## 2023-05-14 PROCEDURE — 85025 COMPLETE CBC W/AUTO DIFF WBC: CPT

## 2023-05-14 PROCEDURE — 96372 THER/PROPH/DIAG INJ SC/IM: CPT

## 2023-05-14 PROCEDURE — 84484 ASSAY OF TROPONIN QUANT: CPT

## 2023-05-14 PROCEDURE — 2580000003 HC RX 258: Performed by: STUDENT IN AN ORGANIZED HEALTH CARE EDUCATION/TRAINING PROGRAM

## 2023-05-14 PROCEDURE — 93010 ELECTROCARDIOGRAM REPORT: CPT | Performed by: INTERNAL MEDICINE

## 2023-05-14 PROCEDURE — 84145 PROCALCITONIN (PCT): CPT

## 2023-05-14 PROCEDURE — 6370000000 HC RX 637 (ALT 250 FOR IP): Performed by: STUDENT IN AN ORGANIZED HEALTH CARE EDUCATION/TRAINING PROGRAM

## 2023-05-14 PROCEDURE — 87636 SARSCOV2 & INF A&B AMP PRB: CPT

## 2023-05-14 PROCEDURE — 96374 THER/PROPH/DIAG INJ IV PUSH: CPT

## 2023-05-14 PROCEDURE — 2580000003 HC RX 258: Performed by: NURSE PRACTITIONER

## 2023-05-14 PROCEDURE — 6360000002 HC RX W HCPCS: Performed by: STUDENT IN AN ORGANIZED HEALTH CARE EDUCATION/TRAINING PROGRAM

## 2023-05-14 PROCEDURE — 6360000002 HC RX W HCPCS: Performed by: NURSE PRACTITIONER

## 2023-05-14 PROCEDURE — 94761 N-INVAS EAR/PLS OXIMETRY MLT: CPT

## 2023-05-14 PROCEDURE — 86140 C-REACTIVE PROTEIN: CPT

## 2023-05-14 PROCEDURE — 99223 1ST HOSP IP/OBS HIGH 75: CPT | Performed by: NURSE PRACTITIONER

## 2023-05-14 PROCEDURE — 84443 ASSAY THYROID STIM HORMONE: CPT

## 2023-05-14 PROCEDURE — 71046 X-RAY EXAM CHEST 2 VIEWS: CPT

## 2023-05-14 PROCEDURE — 85379 FIBRIN DEGRADATION QUANT: CPT

## 2023-05-14 PROCEDURE — 84439 ASSAY OF FREE THYROXINE: CPT

## 2023-05-14 RX ORDER — ACETAMINOPHEN 650 MG/1
650 SUPPOSITORY RECTAL EVERY 6 HOURS PRN
Status: DISCONTINUED | OUTPATIENT
Start: 2023-05-14 | End: 2023-05-16 | Stop reason: HOSPADM

## 2023-05-14 RX ORDER — MAGNESIUM SULFATE IN WATER 40 MG/ML
2000 INJECTION, SOLUTION INTRAVENOUS PRN
Status: DISCONTINUED | OUTPATIENT
Start: 2023-05-14 | End: 2023-05-16 | Stop reason: HOSPADM

## 2023-05-14 RX ORDER — LISINOPRIL 20 MG/1
20 TABLET ORAL DAILY
Status: DISCONTINUED | OUTPATIENT
Start: 2023-05-14 | End: 2023-05-16 | Stop reason: HOSPADM

## 2023-05-14 RX ORDER — DICYCLOMINE HYDROCHLORIDE 10 MG/ML
20 INJECTION INTRAMUSCULAR ONCE
Status: COMPLETED | OUTPATIENT
Start: 2023-05-14 | End: 2023-05-14

## 2023-05-14 RX ORDER — POTASSIUM CHLORIDE 20 MEQ/1
40 TABLET, EXTENDED RELEASE ORAL PRN
Status: DISCONTINUED | OUTPATIENT
Start: 2023-05-14 | End: 2023-05-16 | Stop reason: HOSPADM

## 2023-05-14 RX ORDER — PANTOPRAZOLE SODIUM 40 MG/1
40 TABLET, DELAYED RELEASE ORAL DAILY
Status: DISCONTINUED | OUTPATIENT
Start: 2023-05-14 | End: 2023-05-16 | Stop reason: HOSPADM

## 2023-05-14 RX ORDER — ENOXAPARIN SODIUM 100 MG/ML
30 INJECTION SUBCUTANEOUS 2 TIMES DAILY
Status: DISCONTINUED | OUTPATIENT
Start: 2023-05-14 | End: 2023-05-14

## 2023-05-14 RX ORDER — POTASSIUM CHLORIDE 7.45 MG/ML
10 INJECTION INTRAVENOUS PRN
Status: DISCONTINUED | OUTPATIENT
Start: 2023-05-14 | End: 2023-05-16 | Stop reason: HOSPADM

## 2023-05-14 RX ORDER — ONDANSETRON 4 MG/1
4 TABLET, ORALLY DISINTEGRATING ORAL EVERY 8 HOURS PRN
Status: DISCONTINUED | OUTPATIENT
Start: 2023-05-14 | End: 2023-05-16 | Stop reason: HOSPADM

## 2023-05-14 RX ORDER — ACETAMINOPHEN 650 MG/1
650 SUPPOSITORY RECTAL EVERY 6 HOURS PRN
Status: DISCONTINUED | OUTPATIENT
Start: 2023-05-14 | End: 2023-05-14 | Stop reason: SDUPTHER

## 2023-05-14 RX ORDER — ONDANSETRON 2 MG/ML
4 INJECTION INTRAMUSCULAR; INTRAVENOUS EVERY 6 HOURS PRN
Status: DISCONTINUED | OUTPATIENT
Start: 2023-05-14 | End: 2023-05-16 | Stop reason: HOSPADM

## 2023-05-14 RX ORDER — SODIUM CHLORIDE 0.9 % (FLUSH) 0.9 %
5-40 SYRINGE (ML) INJECTION EVERY 12 HOURS SCHEDULED
Status: DISCONTINUED | OUTPATIENT
Start: 2023-05-14 | End: 2023-05-16 | Stop reason: HOSPADM

## 2023-05-14 RX ORDER — SODIUM CHLORIDE, SODIUM LACTATE, POTASSIUM CHLORIDE, CALCIUM CHLORIDE 600; 310; 30; 20 MG/100ML; MG/100ML; MG/100ML; MG/100ML
INJECTION, SOLUTION INTRAVENOUS CONTINUOUS
Status: DISCONTINUED | OUTPATIENT
Start: 2023-05-14 | End: 2023-05-16 | Stop reason: HOSPADM

## 2023-05-14 RX ORDER — AMLODIPINE BESYLATE 2.5 MG/1
2.5 TABLET ORAL DAILY
Status: DISCONTINUED | OUTPATIENT
Start: 2023-05-15 | End: 2023-05-16 | Stop reason: HOSPADM

## 2023-05-14 RX ORDER — ACETAMINOPHEN 325 MG/1
650 TABLET ORAL EVERY 6 HOURS PRN
Status: DISCONTINUED | OUTPATIENT
Start: 2023-05-14 | End: 2023-05-14 | Stop reason: SDUPTHER

## 2023-05-14 RX ORDER — ACETAMINOPHEN 325 MG/1
650 TABLET ORAL EVERY 6 HOURS PRN
Status: DISCONTINUED | OUTPATIENT
Start: 2023-05-14 | End: 2023-05-16 | Stop reason: HOSPADM

## 2023-05-14 RX ORDER — ASPIRIN 81 MG/1
81 TABLET ORAL DAILY
Status: DISCONTINUED | OUTPATIENT
Start: 2023-05-15 | End: 2023-05-16 | Stop reason: HOSPADM

## 2023-05-14 RX ORDER — ASCORBIC ACID 500 MG
500 TABLET ORAL 2 TIMES DAILY
COMMUNITY

## 2023-05-14 RX ORDER — MORPHINE SULFATE 2 MG/ML
2 INJECTION, SOLUTION INTRAMUSCULAR; INTRAVENOUS ONCE
Status: DISCONTINUED | OUTPATIENT
Start: 2023-05-14 | End: 2023-05-16 | Stop reason: HOSPADM

## 2023-05-14 RX ORDER — ENOXAPARIN SODIUM 100 MG/ML
40 INJECTION SUBCUTANEOUS DAILY
Status: DISCONTINUED | OUTPATIENT
Start: 2023-05-14 | End: 2023-05-16 | Stop reason: HOSPADM

## 2023-05-14 RX ORDER — ASPIRIN 81 MG/1
243 TABLET, CHEWABLE ORAL ONCE
Status: COMPLETED | OUTPATIENT
Start: 2023-05-14 | End: 2023-05-14

## 2023-05-14 RX ORDER — SODIUM CHLORIDE 0.9 % (FLUSH) 0.9 %
5-40 SYRINGE (ML) INJECTION PRN
Status: DISCONTINUED | OUTPATIENT
Start: 2023-05-14 | End: 2023-05-16 | Stop reason: HOSPADM

## 2023-05-14 RX ORDER — 0.9 % SODIUM CHLORIDE 0.9 %
1000 INTRAVENOUS SOLUTION INTRAVENOUS ONCE
Status: COMPLETED | OUTPATIENT
Start: 2023-05-14 | End: 2023-05-14

## 2023-05-14 RX ORDER — ATORVASTATIN CALCIUM 10 MG/1
10 TABLET, FILM COATED ORAL DAILY
Status: DISCONTINUED | OUTPATIENT
Start: 2023-05-15 | End: 2023-05-15 | Stop reason: SDUPTHER

## 2023-05-14 RX ORDER — SODIUM CHLORIDE 9 MG/ML
INJECTION, SOLUTION INTRAVENOUS PRN
Status: DISCONTINUED | OUTPATIENT
Start: 2023-05-14 | End: 2023-05-16 | Stop reason: HOSPADM

## 2023-05-14 RX ORDER — POLYETHYLENE GLYCOL 3350 17 G/17G
17 POWDER, FOR SOLUTION ORAL DAILY PRN
Status: DISCONTINUED | OUTPATIENT
Start: 2023-05-14 | End: 2023-05-16 | Stop reason: HOSPADM

## 2023-05-14 RX ORDER — ALBUTEROL SULFATE 90 UG/1
2 AEROSOL, METERED RESPIRATORY (INHALATION) EVERY 4 HOURS PRN
Status: DISCONTINUED | OUTPATIENT
Start: 2023-05-14 | End: 2023-05-16 | Stop reason: HOSPADM

## 2023-05-14 RX ADMIN — ONDANSETRON 4 MG: 2 INJECTION INTRAMUSCULAR; INTRAVENOUS at 19:43

## 2023-05-14 RX ADMIN — ASPIRIN 81 MG 243 MG: 81 TABLET ORAL at 13:51

## 2023-05-14 RX ADMIN — DICYCLOMINE HYDROCHLORIDE 20 MG: 10 INJECTION, SOLUTION INTRAMUSCULAR at 15:05

## 2023-05-14 RX ADMIN — ENOXAPARIN SODIUM 40 MG: 100 INJECTION SUBCUTANEOUS at 18:01

## 2023-05-14 RX ADMIN — SODIUM CHLORIDE, PRESERVATIVE FREE 10 ML: 5 INJECTION INTRAVENOUS at 19:48

## 2023-05-14 RX ADMIN — SODIUM CHLORIDE 1000 ML: 9 INJECTION, SOLUTION INTRAVENOUS at 14:41

## 2023-05-14 RX ADMIN — SODIUM CHLORIDE, POTASSIUM CHLORIDE, SODIUM LACTATE AND CALCIUM CHLORIDE: 600; 310; 30; 20 INJECTION, SOLUTION INTRAVENOUS at 17:58

## 2023-05-14 RX ADMIN — Medication: at 13:52

## 2023-05-14 ASSESSMENT — PAIN SCALES - GENERAL
PAINLEVEL_OUTOF10: 4
PAINLEVEL_OUTOF10: 3
PAINLEVEL_OUTOF10: 3
PAINLEVEL_OUTOF10: 4
PAINLEVEL_OUTOF10: 4
PAINLEVEL_OUTOF10: 6

## 2023-05-14 ASSESSMENT — PAIN DESCRIPTION - FREQUENCY: FREQUENCY: INTERMITTENT

## 2023-05-14 ASSESSMENT — PAIN - FUNCTIONAL ASSESSMENT
PAIN_FUNCTIONAL_ASSESSMENT: 0-10
PAIN_FUNCTIONAL_ASSESSMENT: ACTIVITIES ARE NOT PREVENTED
PAIN_FUNCTIONAL_ASSESSMENT: 0-10

## 2023-05-14 ASSESSMENT — PAIN DESCRIPTION - PAIN TYPE: TYPE: ACUTE PAIN

## 2023-05-14 ASSESSMENT — PAIN DESCRIPTION - ONSET: ONSET: ON-GOING

## 2023-05-14 ASSESSMENT — LIFESTYLE VARIABLES
HOW MANY STANDARD DRINKS CONTAINING ALCOHOL DO YOU HAVE ON A TYPICAL DAY: PATIENT DOES NOT DRINK
HOW OFTEN DO YOU HAVE A DRINK CONTAINING ALCOHOL: NEVER

## 2023-05-14 ASSESSMENT — HEART SCORE: ECG: 1

## 2023-05-14 ASSESSMENT — PAIN DESCRIPTION - LOCATION: LOCATION: CHEST

## 2023-05-14 ASSESSMENT — PAIN DESCRIPTION - ORIENTATION: ORIENTATION: MID

## 2023-05-14 ASSESSMENT — PAIN DESCRIPTION - DESCRIPTORS: DESCRIPTORS: SHARP

## 2023-05-15 ENCOUNTER — APPOINTMENT (OUTPATIENT)
Dept: CT IMAGING | Age: 73
End: 2023-05-15
Payer: MEDICARE

## 2023-05-15 LAB
ANION GAP SERPL CALC-SCNC: 9 MEQ/L (ref 8–16)
BASOPHILS ABSOLUTE: 0 THOU/MM3 (ref 0–0.1)
BASOPHILS NFR BLD AUTO: 0.2 %
BUN SERPL-MCNC: 23 MG/DL (ref 7–22)
CALCIUM SERPL-MCNC: 8.8 MG/DL (ref 8.5–10.5)
CHLORIDE SERPL-SCNC: 103 MEQ/L (ref 98–111)
CHOLEST SERPL-MCNC: 180 MG/DL (ref 100–199)
CO2 SERPL-SCNC: 23 MEQ/L (ref 23–33)
CREAT SERPL-MCNC: 0.9 MG/DL (ref 0.4–1.2)
DEPRECATED MEAN GLUCOSE BLD GHB EST-ACNC: 120 MG/DL (ref 70–126)
DEPRECATED RDW RBC AUTO: 53.4 FL (ref 35–45)
EOSINOPHIL NFR BLD AUTO: 2.9 %
EOSINOPHILS ABSOLUTE: 0.3 THOU/MM3 (ref 0–0.4)
ERYTHROCYTE [DISTWIDTH] IN BLOOD BY AUTOMATED COUNT: 14.6 % (ref 11.5–14.5)
GFR SERPL CREATININE-BSD FRML MDRD: > 60 ML/MIN/1.73M2
GLUCOSE SERPL-MCNC: 129 MG/DL (ref 70–108)
HBA1C MFR BLD HPLC: 6 % (ref 4.4–6.4)
HCT VFR BLD AUTO: 40.2 % (ref 37–47)
HDLC SERPL-MCNC: 87 MG/DL
HGB BLD-MCNC: 12.7 GM/DL (ref 12–16)
IMM GRANULOCYTES # BLD AUTO: 0.03 THOU/MM3 (ref 0–0.07)
IMM GRANULOCYTES NFR BLD AUTO: 0.3 %
LDLC SERPL CALC-MCNC: 62 MG/DL
LV EF: 50 %
LVEF MODALITY: NORMAL
LYMPHOCYTES ABSOLUTE: 2 THOU/MM3 (ref 1–4.8)
LYMPHOCYTES NFR BLD AUTO: 21 %
MCH RBC QN AUTO: 31.1 PG (ref 26–33)
MCHC RBC AUTO-ENTMCNC: 31.6 GM/DL (ref 32.2–35.5)
MCV RBC AUTO: 98.3 FL (ref 81–99)
MONOCYTES ABSOLUTE: 1 THOU/MM3 (ref 0.4–1.3)
MONOCYTES NFR BLD AUTO: 10.5 %
NEUTROPHILS NFR BLD AUTO: 65.1 %
NRBC BLD AUTO-RTO: 0 /100 WBC
PLATELET # BLD AUTO: 182 THOU/MM3 (ref 130–400)
PMV BLD AUTO: 10.4 FL (ref 9.4–12.4)
POTASSIUM SERPL-SCNC: 5.4 MEQ/L (ref 3.5–5.2)
RBC # BLD AUTO: 4.09 MILL/MM3 (ref 4.2–5.4)
SEGMENTED NEUTROPHILS ABSOLUTE COUNT: 6.1 THOU/MM3 (ref 1.8–7.7)
SODIUM SERPL-SCNC: 135 MEQ/L (ref 135–145)
TRIGL SERPL-MCNC: 155 MG/DL (ref 0–199)
WBC # BLD AUTO: 9.3 THOU/MM3 (ref 4.8–10.8)

## 2023-05-15 PROCEDURE — 6370000000 HC RX 637 (ALT 250 FOR IP): Performed by: HOSPITALIST

## 2023-05-15 PROCEDURE — 36415 COLL VENOUS BLD VENIPUNCTURE: CPT

## 2023-05-15 PROCEDURE — 6360000002 HC RX W HCPCS: Performed by: NURSE PRACTITIONER

## 2023-05-15 PROCEDURE — G0378 HOSPITAL OBSERVATION PER HR: HCPCS

## 2023-05-15 PROCEDURE — 6370000000 HC RX 637 (ALT 250 FOR IP): Performed by: NURSE PRACTITIONER

## 2023-05-15 PROCEDURE — 93306 TTE W/DOPPLER COMPLETE: CPT

## 2023-05-15 PROCEDURE — 96372 THER/PROPH/DIAG INJ SC/IM: CPT

## 2023-05-15 PROCEDURE — 99233 SBSQ HOSP IP/OBS HIGH 50: CPT | Performed by: HOSPITALIST

## 2023-05-15 PROCEDURE — 71275 CT ANGIOGRAPHY CHEST: CPT

## 2023-05-15 PROCEDURE — 85025 COMPLETE CBC W/AUTO DIFF WBC: CPT

## 2023-05-15 PROCEDURE — 6360000004 HC RX CONTRAST MEDICATION: Performed by: HOSPITALIST

## 2023-05-15 PROCEDURE — 83036 HEMOGLOBIN GLYCOSYLATED A1C: CPT

## 2023-05-15 PROCEDURE — 96361 HYDRATE IV INFUSION ADD-ON: CPT

## 2023-05-15 PROCEDURE — 2580000003 HC RX 258: Performed by: NURSE PRACTITIONER

## 2023-05-15 PROCEDURE — 80061 LIPID PANEL: CPT

## 2023-05-15 PROCEDURE — 80048 BASIC METABOLIC PNL TOTAL CA: CPT

## 2023-05-15 RX ORDER — SIMVASTATIN 20 MG
20 TABLET ORAL DAILY
Status: DISCONTINUED | OUTPATIENT
Start: 2023-05-16 | End: 2023-05-16 | Stop reason: HOSPADM

## 2023-05-15 RX ADMIN — SODIUM ZIRCONIUM CYCLOSILICATE 5 G: 5 POWDER, FOR SUSPENSION ORAL at 10:46

## 2023-05-15 RX ADMIN — SODIUM ZIRCONIUM CYCLOSILICATE 5 G: 5 POWDER, FOR SUSPENSION ORAL at 20:17

## 2023-05-15 RX ADMIN — SODIUM ZIRCONIUM CYCLOSILICATE 5 G: 5 POWDER, FOR SUSPENSION ORAL at 14:36

## 2023-05-15 RX ADMIN — ACETAMINOPHEN 650 MG: 325 TABLET ORAL at 07:52

## 2023-05-15 RX ADMIN — ENOXAPARIN SODIUM 40 MG: 100 INJECTION SUBCUTANEOUS at 07:52

## 2023-05-15 RX ADMIN — ATORVASTATIN CALCIUM 10 MG: 10 TABLET, FILM COATED ORAL at 10:15

## 2023-05-15 RX ADMIN — IOPAMIDOL 85 ML: 755 INJECTION, SOLUTION INTRAVENOUS at 12:08

## 2023-05-15 RX ADMIN — ASPIRIN 81 MG: 81 TABLET ORAL at 10:14

## 2023-05-15 RX ADMIN — PANTOPRAZOLE SODIUM 40 MG: 40 TABLET, DELAYED RELEASE ORAL at 07:53

## 2023-05-15 RX ADMIN — SODIUM CHLORIDE, POTASSIUM CHLORIDE, SODIUM LACTATE AND CALCIUM CHLORIDE: 600; 310; 30; 20 INJECTION, SOLUTION INTRAVENOUS at 07:42

## 2023-05-15 RX ADMIN — AMLODIPINE BESYLATE 2.5 MG: 2.5 TABLET ORAL at 10:16

## 2023-05-15 ASSESSMENT — PAIN DESCRIPTION - LOCATION: LOCATION: HEAD

## 2023-05-15 ASSESSMENT — PAIN SCALES - GENERAL
PAINLEVEL_OUTOF10: 1

## 2023-05-15 ASSESSMENT — PAIN - FUNCTIONAL ASSESSMENT: PAIN_FUNCTIONAL_ASSESSMENT: ACTIVITIES ARE NOT PREVENTED

## 2023-05-15 ASSESSMENT — PAIN DESCRIPTION - DESCRIPTORS: DESCRIPTORS: DULL

## 2023-05-15 ASSESSMENT — PAIN DESCRIPTION - ORIENTATION: ORIENTATION: ANTERIOR

## 2023-05-15 NOTE — PLAN OF CARE
Problem: Discharge Planning  Goal: Discharge to home or other facility with appropriate resources  Outcome: Progressing  Flowsheets (Taken 5/15/2023 1548)  Discharge to home or other facility with appropriate resources: Identify barriers to discharge with patient and caregiver  Note: Patient lives at home with spouse. Patient plans to return home at discharge pending cardiac workup. Problem: Pain  Goal: Verbalizes/displays adequate comfort level or baseline comfort level  Outcome: Progressing  Flowsheets (Taken 5/15/2023 1548)  Verbalizes/displays adequate comfort level or baseline comfort level: Encourage patient to monitor pain and request assistance  Note: Patient with complaints of headache this AM. PRN Tylenol given per patient request. Encouraging non pharmacological interventions to help better control pain. Pain goal 0/10. Problem: Respiratory - Adult  Goal: Achieves optimal ventilation and oxygenation  Outcome: Progressing  Flowsheets (Taken 5/15/2023 1548)  Achieves optimal ventilation and oxygenation: Assess for changes in respiratory status  Note: Respirations even and unlabored. Patient continues on room air. COVID + denies symptoms. Problem: Cardiovascular - Adult  Goal: Maintains optimal cardiac output and hemodynamic stability  Outcome: Progressing  Flowsheets (Taken 5/15/2023 1548)  Maintains optimal cardiac output and hemodynamic stability: Monitor blood pressure and heart rate  Note: Vital signs stable. Patient denies any chest pain when asked. Telemetry monitoring continues as ordered. Cardiology consulted for workup due to positive troponin's. Goal: Absence of cardiac dysrhythmias or at baseline  Outcome: Progressing  Flowsheets (Taken 5/15/2023 1548)  Absence of cardiac dysrhythmias or at baseline: Monitor cardiac rate and rhythm  Note: Telemetry monitoring continues. No arrhthymias noted. Telemetry strips read twice per shift.       Problem: Infection - Adult  Goal: Absence of

## 2023-05-15 NOTE — CARE COORDINATION
Case Management Assessment  Initial Evaluation    Date/Time of Evaluation: 5/15/2023 12:49 PM  Assessment Completed by: Titus Son RN    If patient is discharged prior to next notation, then this note serves as note for discharge by case management. Patient Name: Modesta Petty                   YOB: 1950  Diagnosis: Other chest pain [R07.89]  Elevated troponin [R77.8]  Chest pain, unspecified type [R07.9]  COVID [U07.1]                   Date / Time: 5/14/2023 12:46 PM  Location: [address] [place] [address]     Patient Admission Status: Observation   Readmission Risk Low 0-14, Mod 15-19), High > 20: No data recorded  Current PCP: Hanh Albert MD  PCP verified by ? Yes    Chart Reviewed: Yes      History Provided by: Patient  Patient Orientation: Alert and Oriented    Patient Cognition: Alert    Hospitalization in the last 30 days (Readmission):  No    If yes, Readmission Assessment in  Navigator will be completed. Advance Directives:      Code Status: Full Code   Patient's Primary Decision Maker is: Legal Next of Kin      Discharge Planning:    Patient lives with: Spouse/Significant Other Type of Home: House  Primary Care Giver: Self  Patient Support Systems include: Spouse/Significant Other   Current Financial resources: Medicare (and Medical [place])  Current community resources: None  Current services prior to admission: None            Current DME:              Type of Home Care services:  None    ADLS  Prior functional level: Independent in ADLs/IADLs  Current functional level: Independent in ADLs/IADLs    Family can provide assistance at DC: Yes  Would you like Case Management to discuss the discharge plan with any other family members/significant others, and if so, who?  No  Plans to Return to Present Housing: Yes  Other Identified Issues/Barriers to RETURNING to current housing: No  Potential Assistance needed at discharge: N/A            Potential DME:    Patient expects to discharge We could consider lumbar midline epidural at L2-3 as follow up for further benefit. Sometimes its take 1-3 injections for combined benefit.

## 2023-05-15 NOTE — PROGRESS NOTES
Hospitalist Progress Note    Patient:  Zuri Covarrubias      Unit/Bed:8B-22/022-A    YOB: 1950    MRN: [id number]       Acct: [de-identified]     PCP: Nicolasa Zamudio MD    Date of Admission: 5/14/2023    Assessment/Plan:    Chest pain: Patient with slightly elevated troponin on arrival now on downward trend. Endorses exertional chest pain and dyspnea which has improved since she has been here at rest.  CTA chest ordered to rule out PE, venous Dopplers negative. If CTA chest is negative for PE will need additional cardiac work-up. 2D echo ordered  NICHOL eye: Mild dehydration resolved with fluids. Avoid nephrotoxic medications monitor renal function. Bactrim discontinued. COVID-19 infection: Patient reports abdominal pain has resolved, denies any shortness of breath or cough. Remains on room air at this time. History of coronary spasm: Patient follows with [facility] last cardiac catheter was 12 years ago. Requested records to be obtained for more recent cardiac testing from [facility].  Reports this pain is different though. To with aspirin and statin  Hypertension: Continue with amlodipine, monitor blood pressure  Hyperkalemia: Potassium slightly elevated at 5.4, will give [facility] monitor potassium levels. GERD: PPI  DVT prophylaxis: Lovenox        Subjective (past 24 hours):   Patient seen and examined at bedside.   She reports that she is only having the chest discomfort now but was having severe pains she states was localized midsternal and described as a heavy pain that was worse with ambulation and resolved with rest.      Medications:  Reviewed    Infusion Medications    sodium chloride      lactated ringers IV soln 75 mL/hr at 05/15/23 0742     Scheduled Medications    sodium zirconium cyclosilicate  5 g Oral TID    aspirin  81 mg Oral Daily    [Held by provider] lisinopril  20 mg Oral Daily    pantoprazole  40 mg Oral Daily    atorvastatin  10 mg Oral Daily    sodium chloride

## 2023-05-15 NOTE — PLAN OF CARE
Problem: Discharge Planning  Goal: Discharge to home or other facility with appropriate resources  Outcome: Progressing     Problem: Pain  Goal: Verbalizes/displays adequate comfort level or baseline comfort level  Outcome: Progressing     Problem: Respiratory - Adult  Goal: Achieves optimal ventilation and oxygenation  Outcome: Progressing     Problem: Cardiovascular - Adult  Goal: Maintains optimal cardiac output and hemodynamic stability  Outcome: Progressing  Goal: Absence of cardiac dysrhythmias or at baseline  Outcome: Progressing     Problem: Infection - Adult  Goal: Absence of infection at discharge  Outcome: Progressing  Goal: Absence of infection during hospitalization  Outcome: Progressing    Pt educated on plan of care. States understanding.

## 2023-05-16 VITALS
WEIGHT: 189.3 LBS | SYSTOLIC BLOOD PRESSURE: 119 MMHG | OXYGEN SATURATION: 96 % | RESPIRATION RATE: 17 BRPM | HEART RATE: 85 BPM | DIASTOLIC BLOOD PRESSURE: 66 MMHG | HEIGHT: 62 IN | BODY MASS INDEX: 34.83 KG/M2 | TEMPERATURE: 98 F

## 2023-05-16 PROBLEM — R77.8 ELEVATED TROPONIN: Status: ACTIVE | Noted: 2023-05-16

## 2023-05-16 PROBLEM — U07.1 COVID: Status: ACTIVE | Noted: 2023-05-16

## 2023-05-16 PROBLEM — R79.89 ELEVATED TROPONIN: Status: ACTIVE | Noted: 2023-05-16

## 2023-05-16 PROBLEM — R07.9 CHEST PAIN: Status: ACTIVE | Noted: 2023-05-14

## 2023-05-16 LAB
ANION GAP SERPL CALC-SCNC: 12 MEQ/L (ref 8–16)
BASOPHILS ABSOLUTE: 0 THOU/MM3 (ref 0–0.1)
BASOPHILS NFR BLD AUTO: 0.3 %
BUN SERPL-MCNC: 18 MG/DL (ref 7–22)
CALCIUM SERPL-MCNC: 8.2 MG/DL (ref 8.5–10.5)
CHLORIDE SERPL-SCNC: 104 MEQ/L (ref 98–111)
CO2 SERPL-SCNC: 22 MEQ/L (ref 23–33)
CREAT SERPL-MCNC: 0.7 MG/DL (ref 0.4–1.2)
DEPRECATED RDW RBC AUTO: 52 FL (ref 35–45)
EOSINOPHIL NFR BLD AUTO: 5.7 %
EOSINOPHILS ABSOLUTE: 0.4 THOU/MM3 (ref 0–0.4)
ERYTHROCYTE [DISTWIDTH] IN BLOOD BY AUTOMATED COUNT: 14.4 % (ref 11.5–14.5)
GFR SERPL CREATININE-BSD FRML MDRD: > 60 ML/MIN/1.73M2
GLUCOSE SERPL-MCNC: 116 MG/DL (ref 70–108)
HCT VFR BLD AUTO: 37.6 % (ref 37–47)
HGB BLD-MCNC: 11.8 GM/DL (ref 12–16)
IMM GRANULOCYTES # BLD AUTO: 0.02 THOU/MM3 (ref 0–0.07)
IMM GRANULOCYTES NFR BLD AUTO: 0.3 %
LYMPHOCYTES ABSOLUTE: 2 THOU/MM3 (ref 1–4.8)
LYMPHOCYTES NFR BLD AUTO: 26.9 %
MCH RBC QN AUTO: 30.8 PG (ref 26–33)
MCHC RBC AUTO-ENTMCNC: 31.4 GM/DL (ref 32.2–35.5)
MCV RBC AUTO: 98.2 FL (ref 81–99)
MONOCYTES ABSOLUTE: 0.9 THOU/MM3 (ref 0.4–1.3)
MONOCYTES NFR BLD AUTO: 12.2 %
NEUTROPHILS NFR BLD AUTO: 54.6 %
NRBC BLD AUTO-RTO: 0 /100 WBC
PLATELET # BLD AUTO: 168 THOU/MM3 (ref 130–400)
PMV BLD AUTO: 10.4 FL (ref 9.4–12.4)
POTASSIUM SERPL-SCNC: 4.7 MEQ/L (ref 3.5–5.2)
RBC # BLD AUTO: 3.83 MILL/MM3 (ref 4.2–5.4)
SEGMENTED NEUTROPHILS ABSOLUTE COUNT: 4 THOU/MM3 (ref 1.8–7.7)
SODIUM SERPL-SCNC: 138 MEQ/L (ref 135–145)
WBC # BLD AUTO: 7.4 THOU/MM3 (ref 4.8–10.8)

## 2023-05-16 PROCEDURE — 99223 1ST HOSP IP/OBS HIGH 75: CPT | Performed by: INTERNAL MEDICINE

## 2023-05-16 PROCEDURE — 36415 COLL VENOUS BLD VENIPUNCTURE: CPT

## 2023-05-16 PROCEDURE — 80048 BASIC METABOLIC PNL TOTAL CA: CPT

## 2023-05-16 PROCEDURE — G0378 HOSPITAL OBSERVATION PER HR: HCPCS

## 2023-05-16 PROCEDURE — 6370000000 HC RX 637 (ALT 250 FOR IP): Performed by: NURSE PRACTITIONER

## 2023-05-16 PROCEDURE — 96361 HYDRATE IV INFUSION ADD-ON: CPT

## 2023-05-16 PROCEDURE — 96372 THER/PROPH/DIAG INJ SC/IM: CPT

## 2023-05-16 PROCEDURE — 2580000003 HC RX 258: Performed by: NURSE PRACTITIONER

## 2023-05-16 PROCEDURE — 99239 HOSP IP/OBS DSCHRG MGMT >30: CPT | Performed by: HOSPITALIST

## 2023-05-16 PROCEDURE — 85025 COMPLETE CBC W/AUTO DIFF WBC: CPT

## 2023-05-16 PROCEDURE — 6360000002 HC RX W HCPCS: Performed by: NURSE PRACTITIONER

## 2023-05-16 RX ADMIN — ACETAMINOPHEN 650 MG: 325 TABLET ORAL at 11:25

## 2023-05-16 RX ADMIN — AMLODIPINE BESYLATE 2.5 MG: 2.5 TABLET ORAL at 11:22

## 2023-05-16 RX ADMIN — ASPIRIN 81 MG: 81 TABLET ORAL at 08:29

## 2023-05-16 RX ADMIN — SODIUM CHLORIDE, PRESERVATIVE FREE 10 ML: 5 INJECTION INTRAVENOUS at 11:25

## 2023-05-16 RX ADMIN — PANTOPRAZOLE SODIUM 40 MG: 40 TABLET, DELAYED RELEASE ORAL at 08:29

## 2023-05-16 RX ADMIN — ENOXAPARIN SODIUM 40 MG: 100 INJECTION SUBCUTANEOUS at 08:27

## 2023-05-16 ASSESSMENT — PAIN DESCRIPTION - LOCATION: LOCATION: HEAD

## 2023-05-16 ASSESSMENT — PAIN DESCRIPTION - ORIENTATION: ORIENTATION: MID

## 2023-05-16 ASSESSMENT — PAIN SCALES - GENERAL
PAINLEVEL_OUTOF10: 0
PAINLEVEL_OUTOF10: 4

## 2023-05-16 ASSESSMENT — PAIN DESCRIPTION - DESCRIPTORS: DESCRIPTORS: DISCOMFORT

## 2023-05-16 ASSESSMENT — PAIN - FUNCTIONAL ASSESSMENT: PAIN_FUNCTIONAL_ASSESSMENT: ACTIVITIES ARE NOT PREVENTED

## 2023-05-16 NOTE — PLAN OF CARE
Problem: Discharge Planning  Goal: Discharge to home or other facility with appropriate resources  Outcome: Completed     Problem: Pain  Goal: Verbalizes/displays adequate comfort level or baseline comfort level  Outcome: Completed     Problem: Respiratory - Adult  Goal: Achieves optimal ventilation and oxygenation  Outcome: Completed     Problem: Cardiovascular - Adult  Goal: Maintains optimal cardiac output and hemodynamic stability  Outcome: Completed  Goal: Absence of cardiac dysrhythmias or at baseline  Outcome: Completed     Problem: Infection - Adult  Goal: Absence of infection at discharge  Outcome: Completed  Goal: Absence of infection during hospitalization  Outcome: Completed

## 2023-05-16 NOTE — DISCHARGE SUMMARY
Discharge Summary    Patient:  Judy Archer  YOB: 1950    MRN: [id number]   Acct: [de-identified]    Primary Care Physician: Dave Mancini MD    Admit date:  5/14/2023    Discharge date:  5/16/2023       Discharge Diagnoses:   Other chest pain  Principal Problem:    Other chest pain  Resolved Problems:    * No resolved hospital problems. *        Admitted for: (HPI)  Judy Archer is a 81 y.o. female with PMHx that includes: Hypertension, hyperlipidemia, coronary artery spasm, mitral valve prolapse, GERD, hiatal hernia, chronic right ear otitis media who presented to [facility] with chief complaint of chest pain with associated shortness of breath. Patient is alert, oriented x3, pleasant, no acute distress. On 5 3 patient was seen by ear nose throat specialist, has chronic right ear otitis media and she felt symptomatic. Patient was placed on Bactrim and Medrol Dosepak. Patient states she began noticing intermittent chest pain soon after starting the Bactrim. Chest discomfort is described as substernal pressure, intermittent, worsening over the past few days especially at night. Nonexertional, nonreproducible, nonradiating. Patient reports difficulty \"catching my breath\" associated with the episodes of chest pressure. Denies associated symptoms such as diaphoresis or lightheadedness. Denies lower extremity edema or calf pain. Patient states she did not sleep well overnight and this morning decided to seek medical attention. She did take her last dose of Bactrim yesterday evening which completed day 10 of 14 of the course she reports completing Medrol Dosepak a few days ago. In the interim patient has also developed what she describes as abdominal cramping, no diarrhea or constipation, no nausea. Reports good appetite but she has been eating less the last few days because of this cramping.   Patient states she took a home COVID test prior to presenting to the hospital which was

## 2023-05-16 NOTE — CARE COORDINATION
5/16/23, 7:18 AM EDT    DISCHARGE ON GOING EVALUATION    Pavan Knight day: 0  Location: -22/022-A Reason for admit: Other chest pain [R07.89]  Elevated troponin [R77.8]  Chest pain, unspecified type [R07.9]  COVID [U07.1]   Procedure: No.  Barriers to Discharge: CT chest neg for PE. Echo ordered. Cardiology consulted yesterday, await their visit and plan. LR at 75/hr. PCP: Ana An MD  Patient Goals/Plan/Treatment Preferences: Home with spouse. 5/16/23, 12:39 PM EDT    Patient goals/plan/ treatment preferences discussed by  and . Patient goals/plan/ treatment preferences reviewed with patient/ family. Patient/ family verbalize understanding of discharge plan and are in agreement with goal/plan/treatment preferences. Understanding was demonstrated using the teach back method. AVS provided by RN at time of discharge, which includes all necessary medical information pertaining to the patients current course of illness, treatment, post-discharge goals of care, and treatment preferences. Services At/After Discharge: None       IMM Letter  Observation Status Letter date given[de-identified] 05/14/23  Observation Status Letter time given[de-identified] 65  Observation Status Letter given to Patient/Family/Significant other/Guardian/POA/by[de-identified] Pt Access    Potential discharge home today. Plans return home with spouse as prior to admission.

## 2023-05-16 NOTE — PROGRESS NOTES
Hospitalist Progress Note    Patient:  Arlen Martin      Unit/Bed:8B-22/022-A    YOB: 1950    MRN: [id number]       Acct: [de-identified]     PCP: Molly Sloan MD    Date of Admission: 5/14/2023    Assessment/Plan:    Chest pain: Patient with slightly elevated troponin on arrival now on downward trend. Endorses exertional chest pain and dyspnea which has improved since she has been here at rest.  CTA chest negative for PE. 2D echo shows EF 50% with mild global LV hypokinesis. Positive stress test in 2018 at [facility]. Keep NPO, cardiology consulted  NICHOL : Mild dehydration resolved with fluids. Avoid nephrotoxic medications monitor renal function. Bactrim discontinued. COVID-19 infection: Patient reports abdominal pain has resolved, denies any shortness of breath or cough. Remains on room air at this time. History of coronary spasm: Patient follows with [facility] last cardiac catheter was 12 years ago. Requested records to be obtained for more recent cardiac testing from [facility].  Reports this pain is different though. To with aspirin and statin  Hypertension: Continue with amlodipine, monitor blood pressure  Hyperkalemia: Potassium slightly elevated at 5.4, will give [facility] monitor potassium levels. GERD: PPI  DVT prophylaxis: Lovenox        Subjective (past 24 hours):   Patient seen and examined at bedside. Denies any chest pain or SOB, no new complaints or acute overnight events.     Medications:  Reviewed    Infusion Medications    sodium chloride      lactated ringers IV soln 75 mL/hr at 05/15/23 0742     Scheduled Medications    sodium zirconium cyclosilicate  5 g Oral TID    simvastatin  20 mg Oral Daily    aspirin  81 mg Oral Daily    [Held by provider] lisinopril  20 mg Oral Daily    pantoprazole  40 mg Oral Daily    sodium chloride flush  5-40 mL IntraVENous 2 times per day    morphine  2 mg IntraVENous Once    enoxaparin  40 mg SubCUTAneous Daily    amLODIPine  2.5

## 2023-05-16 NOTE — CONSULTS
05/14/23  1300 05/15/23  0623 05/16/23  0549   WBC 11.6* 9.3 7.4   HGB 14.3 12.7 11.8*   HCT 43.7 40.2 37.6   MCV 96.0 98.3 98.2    182 168     BMP:   Recent Labs     05/14/23  1300 05/15/23  0623 05/16/23  0549    135 138   K 5.0 5.4* 4.7    103 104   CO2 22* 23 22*   BUN 31* 23* 18   CREATININE 1.2 0.9 0.7     Accucheck Glucoses: No results for input(s): POCGLU in the last 72 hours. Cardiac Enzymes: No results for input(s): CKTOTAL, CKMB, CKMBINDEX, TROPONINI in the last 72 hours. PT/INR: No results for input(s): PROTIME, INR in the last 72 hours. APTT: No results for input(s): APTT in the last 72 hours.   Liver Profile:  Lab Results   Component Value Date/Time    AST 21 05/14/2023 01:00 PM    ALT 20 05/14/2023 01:00 PM    BILITOT 0.4 05/14/2023 01:00 PM    ALKPHOS 110 05/14/2023 01:00 PM     Lab Results   Component Value Date/Time    CHOL 180 05/15/2023 06:23 AM    HDL 87 05/15/2023 06:23 AM    TRIG 155 05/15/2023 06:23 AM     TSH:   Lab Results   Component Value Date/Time    TSH 4.340 05/14/2023 01:00 PM     UA:   Lab Results   Component Value Date/Time    COLORU Colorless 08/30/2019 10:20 PM    WBCUA 0-5 08/30/2019 10:20 PM    RBCUA None 08/30/2019 10:20 PM    LEUKOCYTESUR Trace 08/30/2019 10:20 PM    UROBILINOGEN <2.0 E.U./dL 08/30/2019 10:20 PM    BILIRUBINUR Negative 08/30/2019 10:20 PM    GLUCOSEU Negative 08/30/2019 10:20 PM     Physical Exam:  Vitals:    05/16/23 0815   BP: (!) 93/57   Pulse: 84   Resp: 17   Temp: 98.4 °F (36.9 °C)   SpO2: 95%      Intake/Output Summary (Last 24 hours) at 5/16/2023 0841  Last data filed at 5/15/2023 1637  Gross per 24 hour   Intake 520 ml   Output --   Net 520 ml      General:  No acute distress  Neck: Supple, no JVD, no carotid bruits  Heart: Regular rhythm normal S1 and S2, no rubs, murmurs or gallops  Lungs: clear to ascultation no rales, wheezes, or rhonchi  Abdomen: positive bowel sounds, soft, non-tender, non-distended, no bruits, no

## 2023-05-17 ENCOUNTER — CARE COORDINATION (OUTPATIENT)
Dept: CASE MANAGEMENT | Age: 73
End: 2023-05-17

## 2023-05-17 DIAGNOSIS — U07.1 COVID: Primary | ICD-10-CM

## 2023-05-17 NOTE — CARE COORDINATION
[facility] Care Transitions Initial Follow Up Call    Call within 2 business days of discharge: Yes    Patient Current Location:  Home: [facility]    Care Transition Nurse contacted the patient by telephone to perform post hospital discharge assessment. Verified name and  as identifiers. Provided introduction to self, and explanation of the Care Transition Nurse role. Patient: Loise Burn Patient : 1950   MRN: <[id number]>  Reason for Admission: CP, Ziegler Russell  Discharge Date: 23 RARS: No data recorded  Facility: [facility] 23-23  Last Discharge  [address]       Date Complaint Diagnosis Description Type Department Provider    23 Chest Pain Chest pain, unspecified type . .. ED to Hosp-Admission (Discharged) (ADMITTED) Christina Collier MD; Dede Moore MD... Was this an external facility discharge? No   Challenges to be reviewed by the provider   Additional needs identified to be addressed with provider: No       Method of communication with provider: none. Patient reports doing well since home. Denies chest pain/pressure, ac distress. No bp cuff monitoring system, declined RPM. Advised heart healthy diet; proper nutrition, hydration. Denies cough, fever, chills or other Covid19 sx (reports she has been asymptomatic). Reports appetite, fluid intake good w/ b&b wnl. Denies resource needs. Care Transition Nurse reviewed discharge instructions, medical action plan, and red flags with patient who verbalized understanding. The patient was given an opportunity to ask questions and does not have any further questions or concerns at this time. Were discharge instructions available to patient? Yes. Reviewed appropriate site of care based on symptoms and resources available to patient including: PCP  Urgent care clinics  MyChart Messaging. The patient agrees to contact PCP office for questions related to healthcare.      Advance Care Planning:   Does patient

## 2023-05-24 ENCOUNTER — CARE COORDINATION (OUTPATIENT)
Dept: CASE MANAGEMENT | Age: 73
End: 2023-05-24

## 2023-05-24 NOTE — CARE COORDINATION
[facility] Care Transitions Follow Up Call    Patient Current Location:  Home: [address] [facility] Coordinator contacted the patient by telephone to follow up after admission on -. Verified name and  with patient as identifiers. Patient: Gonzalo Veras  Patient : 1950   MRN: <[id number]>  Reason for Admission: CP, COVID  Discharge Date: 23 RARS: No data recorded    Needs to be reviewed by the provider   Additional needs identified to be addressed with provider: No  none             Method of communication with provider: none. LPN CC spoke briefly with patient. States she is ok. Denies s/s illness: fever/chills, N/V/D, HA, CP, SOB, cough. Appetite good. Denies problems with bowels or bladder. Denies medication changes. Going to cardio appt tomorrow. Denies needs. Brock Waters LPN CC  Care Transitions  [phone]    Addressed changes since last contact:  none  Discussed follow-up appointments. If no appointment was previously scheduled, appointment scheduling offered: Yes. Is follow up appointment scheduled within 7 days of discharge? Yes. Follow Up  Future Appointments   Date Time Provider Loco Márquez   2023  9:50 AM Tremayne Kay MD [facility] [address]   2023  9:30 AM STR ULTRASOUND RM 2 STRZ US STR Radiolog     Non-[facility] follow up appointment(s): NA    LPN Care Coordinator reviewed medical action plan and red flags with patient and discussed any barriers to care and/or understanding of plan of care after discharge. Discussed appropriate site of care based on symptoms and resources available to patient including: PCP  Specialist  Urgent care clinics  When to call [address] The patient agrees to contact the PCP office for questions related to their healthcare. Advance Care Planning:   not on file.      Patients top risk factors for readmission: medical condition-CP, COVID  Interventions to address risk factors:

## 2023-05-26 ENCOUNTER — OFFICE VISIT (OUTPATIENT)
Dept: FAMILY MEDICINE CLINIC | Age: 73
End: 2023-05-26

## 2023-05-26 VITALS
HEIGHT: 62 IN | BODY MASS INDEX: 33.72 KG/M2 | WEIGHT: 183.25 LBS | HEART RATE: 70 BPM | DIASTOLIC BLOOD PRESSURE: 80 MMHG | SYSTOLIC BLOOD PRESSURE: 128 MMHG | RESPIRATION RATE: 16 BRPM

## 2023-05-26 DIAGNOSIS — R07.9 CHEST PAIN, UNSPECIFIED TYPE: Primary | ICD-10-CM

## 2023-05-26 SDOH — ECONOMIC STABILITY: INCOME INSECURITY: HOW HARD IS IT FOR YOU TO PAY FOR THE VERY BASICS LIKE FOOD, HOUSING, MEDICAL CARE, AND HEATING?: NOT HARD AT ALL

## 2023-05-26 SDOH — ECONOMIC STABILITY: HOUSING INSECURITY
IN THE LAST 12 MONTHS, WAS THERE A TIME WHEN YOU DID NOT HAVE A STEADY PLACE TO SLEEP OR SLEPT IN A SHELTER (INCLUDING NOW)?: NO

## 2023-05-26 SDOH — ECONOMIC STABILITY: FOOD INSECURITY: WITHIN THE PAST 12 MONTHS, YOU WORRIED THAT YOUR FOOD WOULD RUN OUT BEFORE YOU GOT MONEY TO BUY MORE.: NEVER TRUE

## 2023-05-26 SDOH — ECONOMIC STABILITY: FOOD INSECURITY: WITHIN THE PAST 12 MONTHS, THE FOOD YOU BOUGHT JUST DIDN'T LAST AND YOU DIDN'T HAVE MONEY TO GET MORE.: NEVER TRUE

## 2023-05-26 ASSESSMENT — PATIENT HEALTH QUESTIONNAIRE - PHQ9
SUM OF ALL RESPONSES TO PHQ QUESTIONS 1-9: 0
5. POOR APPETITE OR OVEREATING: 0
9. THOUGHTS THAT YOU WOULD BE BETTER OFF DEAD, OR OF HURTING YOURSELF: 0
7. TROUBLE CONCENTRATING ON THINGS, SUCH AS READING THE NEWSPAPER OR WATCHING TELEVISION: 0
8. MOVING OR SPEAKING SO SLOWLY THAT OTHER PEOPLE COULD HAVE NOTICED. OR THE OPPOSITE, BEING SO FIGETY OR RESTLESS THAT YOU HAVE BEEN MOVING AROUND A LOT MORE THAN USUAL: 0
6. FEELING BAD ABOUT YOURSELF - OR THAT YOU ARE A FAILURE OR HAVE LET YOURSELF OR YOUR FAMILY DOWN: 0
4. FEELING TIRED OR HAVING LITTLE ENERGY: 0
2. FEELING DOWN, DEPRESSED OR HOPELESS: 0
SUM OF ALL RESPONSES TO PHQ9 QUESTIONS 1 & 2: 0
SUM OF ALL RESPONSES TO PHQ QUESTIONS 1-9: 0
1. LITTLE INTEREST OR PLEASURE IN DOING THINGS: 0
SUM OF ALL RESPONSES TO PHQ QUESTIONS 1-9: 0
10. IF YOU CHECKED OFF ANY PROBLEMS, HOW DIFFICULT HAVE THESE PROBLEMS MADE IT FOR YOU TO DO YOUR WORK, TAKE CARE OF THINGS AT HOME, OR GET ALONG WITH OTHER PEOPLE: 0
3. TROUBLE FALLING OR STAYING ASLEEP: 0
SUM OF ALL RESPONSES TO PHQ QUESTIONS 1-9: 0

## 2023-05-26 ASSESSMENT — ENCOUNTER SYMPTOMS
SHORTNESS OF BREATH: 0
CONSTIPATION: 0
SINUS PRESSURE: 0

## 2023-05-26 NOTE — PROGRESS NOTES
No components found for: CHLPL  Lab Results   Component Value Date    TRIG 155 05/15/2023     Lab Results   Component Value Date    HDL 87 05/15/2023     Lab Results   Component Value Date    LDLCALC 62 05/15/2023     No results found for: LABVLDL    Lab Results   Component Value Date    ALT 20 05/14/2023    AST 21 05/14/2023    ALKPHOS 110 05/14/2023    BILITOT 0.4 05/14/2023           Is patient currently taking any cholesterol medications? Yes   If yes, see med list as above    Is the patient reporting any side effects of cholesterol medications? No    Is the patient taking any over the counter medications? Yes   If yes, see med list as above    Is the patient taking a daily aspirin? Yes      Patient Self-Management Goal for Chronic Condition  Goal: I will take all medications as prescribed by my doctor, and I will call the office if I am having any medication problems. Barriers to success: none  Plan for overcoming my barriers: N/A     Confidence: 9/10  Date goal set: 5/26/23  Date goal attained:     Have you seen any other physician or provider since your last visit no    Have you had any other diagnostic tests since your last visit? no    Have you changed or stopped any medications since your last visit including any over-the-counter medicines, vitamins, or herbal medicines? no     Are you taking all your prescribed medications?  Yes    If NO, why?

## 2023-05-26 NOTE — PATIENT INSTRUCTIONS
Keep trying to watch the carbs and 20 minutes of walking daily  See me after 7/14/2023 for the [facility] Wellness Visit

## 2023-05-26 NOTE — PROGRESS NOTES
Debra San (:  1950) is a 81 y.o. female,Established patient, here for evaluation of the following chief complaint(s): Other [facility])         ASSESSMENT/PLAN:   Diagnosis Orders   1. Chest pain, unspecified type               Keep trying to watch the carbs and 20 minutes of walking daily  See me after 2023 for the Medicare Wellness Visit    Subjective   SUBJECTIVE/OBJECTIVE:  HPI  We reviewed the recent hospital stay  She was seen at [facility] and then followed up with Dr Roxi Pickett  We discussed the LDL being 60 and the A1c of 6.0  Review of Systems   Constitutional:  Negative for fatigue. HENT:  Negative for sinus pressure. Eyes:  Negative for visual disturbance. Respiratory:  Negative for shortness of breath. Cardiovascular:  Positive for chest pain. Gastrointestinal:  Negative for constipation. Genitourinary: Negative. Musculoskeletal:  Negative for arthralgias. Skin:  Negative for rash. Neurological:  Negative for headaches. The patient's medications, allergies, past medical problems, surgical, social, and family histories were reviewed and updated as needed. Objective   Physical Exam  Constitutional:       Appearance: Normal appearance. She is well-developed. HENT:      Head: Normocephalic and atraumatic. Eyes:      General: No scleral icterus. Conjunctiva/sclera: Conjunctivae normal.   Neck:      Trachea: No tracheal deviation. Cardiovascular:      Rate and Rhythm: Normal rate and regular rhythm. Heart sounds: Normal heart sounds. Pulmonary:      Effort: Pulmonary effort is normal.      Breath sounds: Normal breath sounds. Skin:     General: Skin is warm and dry. Neurological:      General: No focal deficit present. Mental Status: She is alert. Psychiatric:         Behavior: Behavior normal.    Blood pressure 128/80, pulse 70, resp.  rate 16, height 5' 2\" (1.575 m), weight 183 lb 4 oz (83.1 kg), not currently

## 2023-06-15 PROBLEM — R79.89 ELEVATED TROPONIN: Status: RESOLVED | Noted: 2023-05-16 | Resolved: 2023-06-15

## 2023-06-15 PROBLEM — R77.8 ELEVATED TROPONIN: Status: RESOLVED | Noted: 2023-05-16 | Resolved: 2023-06-15

## 2023-06-22 ENCOUNTER — OFFICE VISIT (OUTPATIENT)
Dept: FAMILY MEDICINE CLINIC | Age: 73
End: 2023-06-22

## 2023-06-22 ENCOUNTER — TELEPHONE (OUTPATIENT)
Dept: FAMILY MEDICINE CLINIC | Age: 73
End: 2023-06-22

## 2023-06-22 VITALS
RESPIRATION RATE: 20 BRPM | SYSTOLIC BLOOD PRESSURE: 122 MMHG | DIASTOLIC BLOOD PRESSURE: 60 MMHG | WEIGHT: 184.5 LBS | BODY MASS INDEX: 33.95 KG/M2 | HEART RATE: 72 BPM | HEIGHT: 62 IN

## 2023-06-22 DIAGNOSIS — F33.41 RECURRENT MAJOR DEPRESSIVE DISORDER IN PARTIAL REMISSION (HCC): ICD-10-CM

## 2023-06-22 DIAGNOSIS — M46.1 SACROILIAC INFLAMMATION (HCC): ICD-10-CM

## 2023-06-22 RX ORDER — METOPROLOL SUCCINATE 25 MG/1
25 TABLET, EXTENDED RELEASE ORAL DAILY
COMMUNITY

## 2023-06-22 RX ORDER — FAMOTIDINE 20 MG/1
20 TABLET, FILM COATED ORAL 2 TIMES DAILY
COMMUNITY

## 2023-06-22 ASSESSMENT — ENCOUNTER SYMPTOMS
CONSTIPATION: 0
SHORTNESS OF BREATH: 0
SINUS PRESSURE: 0

## 2023-06-22 NOTE — PROGRESS NOTES
Skyler Foss (:  1950) is a 81 y.o. female,Established patient, here for evaluation of the following chief complaint(s): Anxiety         ASSESSMENT/PLAN:   Diagnosis Orders   1. Sacroiliac inflammation ([address]        2. Recurrent major depressive disorder in partial remission (HCC)  sertraline (ZOLOFT) 50 MG tablet             Let us know the instructions on the metoprolol  Let us know the number of milligrams for the pepcid  Resume the generic zoloft as about  See me in August as arranged    Subjective   SUBJECTIVE/OBJECTIVE:  HPI  The anxiety is bad due to seveal issues and that affects the depression  She had been on the zoloft and it was stopped due to weight gain but her daughters tolerate it well  The sacroiliac inflammation is improved but still present following the back surgery    Review of Systems   Constitutional:  Negative for fatigue. HENT:  Negative for sinus pressure. Eyes:  Negative for visual disturbance. Respiratory:  Negative for shortness of breath. Cardiovascular:  Negative for chest pain. Gastrointestinal:  Negative for constipation. Genitourinary: Negative. Musculoskeletal:  Negative for arthralgias. Skin:  Negative for rash. Neurological:  Negative for headaches. The patient's medications, allergies, past medical problems, surgical, social, and family histories were reviewed and updated as needed. Objective   Physical Exam  Constitutional:       Appearance: Normal appearance. She is well-developed. HENT:      Head: Normocephalic and atraumatic. Eyes:      General: No scleral icterus. Conjunctiva/sclera: Conjunctivae normal.   Neck:      Trachea: No tracheal deviation. Cardiovascular:      Rate and Rhythm: Normal rate and regular rhythm. Heart sounds: Normal heart sounds. Pulmonary:      Effort: Pulmonary effort is normal.      Breath sounds: Normal breath sounds. Skin:     General: Skin is warm and dry.    Neurological:      General:

## 2023-06-22 NOTE — PATIENT INSTRUCTIONS
Let us know the instructions on the metoprolol  Let us know the number of milligrams for the pepcid  Resume the generic zoloft as about  See me in August as arranged

## 2023-06-22 NOTE — TELEPHONE ENCOUNTER
Pt called in to let Dr Judith Hodges know she takes the metroprolol succinate 25 mg and pepcid OTC 20 mg each

## 2023-06-28 NOTE — H&P
gait problem, neck pain and neck stiffness. Skin: Negative for color change, rash and wound. Allergic/Immunologic: Negative for environmental allergies and food allergies. Neurological: Negative for dizziness, seizures, light-headedness, numbness and headaches. Hematological: Does not bruise/bleed easily. Psychiatric/Behavioral: Negative for sleep disturbance. The patient is not nervous/anxious.          Objective:      Vitals       Vitals:     05/14/19 0800   BP: 110/65   Site: Left Upper Arm   Position: Sitting   Cuff Size: Medium Adult   Pulse: 71   Weight: 187 lb (84.8 kg)   Height: 5' 2\" (1.575 m)            Physical Exam   Constitutional: She is oriented to person, place, and time. She appears well-developed and well-nourished. No distress. HENT:   Head: Normocephalic and atraumatic. Not macrocephalic and not microcephalic. Right Ear: External ear normal.   Left Ear: External ear normal.   Eyes: Conjunctivae are normal. Right eye exhibits no discharge. Left eye exhibits no discharge. Neck: No tracheal deviation present. Pulmonary/Chest: Effort normal. No respiratory distress. Musculoskeletal: She exhibits tenderness. She exhibits no edema. Lumbar back: She exhibits decreased range of motion, tenderness, pain and spasm. Back:    Neurological: She is alert and oriented to person, place, and time. No cranial nerve deficit. Skin: Skin is warm and dry. No rash noted. She is not diaphoretic. No pallor. Psychiatric: She has a normal mood and affect. Her speech is normal and behavior is normal. Judgment and thought content normal. She is not actively hallucinating. Cognition and memory are normal. She is attentive. Vitals reviewed.        Assessment:      1. Spondylosis of lumbar region without myelopathy or radiculopathy    2. Chronic pain syndrome       Plan:      · OARRS reviewed. Current MED: 0  · Patient was not offered naloxone for home.    · Discussed long term side effects of medications, tolerance, dependency and addiction. · Previous UDS reviewed  · Patient told can not receive any pain medications from any other source. · No evidence of abuse, diversion or aberrant behavior. · Medications and/or procedures to improve function and quality of life- patient understanding with this and that may not be pain free  · Discussed with patient about safe storage of medications at home  · Discussed possible weaning of medication dosing dependent on treatment/procedure results. · Testing: none  · Procedures: Lumbar RFA L2-3, L3-4, L4-5 BILATERAL, RIGHT SIDE FIRST - schedule both please   · Discussed with patient about risks with procedure including infection, reaction to medication, increased pain, or bleeding.   · Medications: none     show

## 2023-07-08 DIAGNOSIS — M85.89 OSTEOPENIA OF MULTIPLE SITES: ICD-10-CM

## 2023-07-10 DIAGNOSIS — Z78.0 POST-MENOPAUSAL: Primary | ICD-10-CM

## 2023-07-10 RX ORDER — RALOXIFENE HYDROCHLORIDE 60 MG/1
TABLET, FILM COATED ORAL
Qty: 30 TABLET | Refills: 1 | Status: SHIPPED | OUTPATIENT
Start: 2023-07-10

## 2023-07-10 NOTE — TELEPHONE ENCOUNTER
The pharmacy is requesting a refill of the below medication which has been pended for you:     Requested Prescriptions     Pending Prescriptions Disp Refills    raloxifene (EVISTA) 60 MG tablet [Pharmacy Med Name: Raloxifene HCl Oral Tablet 60 MG] 90 tablet 0     Sig: TAKE 1 TABLET BY MOUTH EVERY DAY       Last Appointment Date: 6/22/2023  Next Appointment Date: 8/11/2023    Allergies   Allergen Reactions    Neomycin Swelling     Ear drop caused swelling of the canal    Pcn [Penicillins] Rash

## 2023-08-11 ENCOUNTER — OFFICE VISIT (OUTPATIENT)
Dept: FAMILY MEDICINE CLINIC | Age: 73
End: 2023-08-11

## 2023-08-11 VITALS
WEIGHT: 185.38 LBS | DIASTOLIC BLOOD PRESSURE: 60 MMHG | BODY MASS INDEX: 34.11 KG/M2 | SYSTOLIC BLOOD PRESSURE: 106 MMHG | HEIGHT: 62 IN | RESPIRATION RATE: 12 BRPM | HEART RATE: 72 BPM

## 2023-08-11 DIAGNOSIS — Z00.00 MEDICARE ANNUAL WELLNESS VISIT, SUBSEQUENT: Primary | ICD-10-CM

## 2023-08-11 DIAGNOSIS — R73.03 PRE-DIABETES: ICD-10-CM

## 2023-08-11 DIAGNOSIS — I10 ESSENTIAL HYPERTENSION: ICD-10-CM

## 2023-08-11 DIAGNOSIS — F33.41 RECURRENT MAJOR DEPRESSIVE DISORDER IN PARTIAL REMISSION (HCC): ICD-10-CM

## 2023-08-11 RX ORDER — FAMOTIDINE 20 MG/1
40 TABLET, FILM COATED ORAL DAILY
COMMUNITY
Start: 2023-08-11

## 2023-08-11 ASSESSMENT — PATIENT HEALTH QUESTIONNAIRE - PHQ9
SUM OF ALL RESPONSES TO PHQ QUESTIONS 1-9: 7
SUM OF ALL RESPONSES TO PHQ9 QUESTIONS 1 & 2: 2
SUM OF ALL RESPONSES TO PHQ QUESTIONS 1-9: 7
SUM OF ALL RESPONSES TO PHQ QUESTIONS 1-9: 7
4. FEELING TIRED OR HAVING LITTLE ENERGY: 2
10. IF YOU CHECKED OFF ANY PROBLEMS, HOW DIFFICULT HAVE THESE PROBLEMS MADE IT FOR YOU TO DO YOUR WORK, TAKE CARE OF THINGS AT HOME, OR GET ALONG WITH OTHER PEOPLE: 0
3. TROUBLE FALLING OR STAYING ASLEEP: 1
9. THOUGHTS THAT YOU WOULD BE BETTER OFF DEAD, OR OF HURTING YOURSELF: 0
SUM OF ALL RESPONSES TO PHQ QUESTIONS 1-9: 7
8. MOVING OR SPEAKING SO SLOWLY THAT OTHER PEOPLE COULD HAVE NOTICED. OR THE OPPOSITE, BEING SO FIGETY OR RESTLESS THAT YOU HAVE BEEN MOVING AROUND A LOT MORE THAN USUAL: 0
6. FEELING BAD ABOUT YOURSELF - OR THAT YOU ARE A FAILURE OR HAVE LET YOURSELF OR YOUR FAMILY DOWN: 0
2. FEELING DOWN, DEPRESSED OR HOPELESS: 1
5. POOR APPETITE OR OVEREATING: 2
7. TROUBLE CONCENTRATING ON THINGS, SUCH AS READING THE NEWSPAPER OR WATCHING TELEVISION: 0
1. LITTLE INTEREST OR PLEASURE IN DOING THINGS: 1

## 2023-08-11 NOTE — PROGRESS NOTES
Medicare Annual Wellness Visit    Raul Flores is here for Medicare AWV    Assessment & Plan   Essential hypertension  Recurrent major depressive disorder in partial remission [facility])    Recommendations for Preventive Services Due: see orders and patient instructions/AVS.  Recommended screening schedule for the next 5-10 years is provided to the patient in written form: see Patient Instructions/AVS.     No follow-ups on file. Subjective   The following acute and/or chronic problems were also addressed today:  The evista is going to run out soon and we discussed that it is ok to be out a few days. She cut the zoloft in half as she thought it was causing too much fatigue. This began about 2.5 weeks ago    Patient's complete Health Risk Assessment and screening values have been reviewed and are found in Flowsheets. The following problems were reviewed today and where indicated follow up appointments were made and/or referrals ordered. Positive Risk Factor Screenings with Interventions:        Depression:  PHQ-2 Score: 2  PHQ-9 Total Score: 7    Interpretation:   1-4 = minimal  5-9 = mild  10-14 = moderate  15-19 = moderately severe  20-27 = severe  Interventions:  Patient comments: we reviewed the meds            Weight and Activity:  Physical Activity: Inactive    Days of Exercise per Week: 0 days    Minutes of Exercise per Session: 0 min     On average, how many days per week do you engage in moderate to strenuous exercise (like a brisk walk)?: 0 days  Have you lost any weight without trying in the past 3 months?: No  There is no height or weight on file to calculate BMI. (!) Abnormal    Inactivity Interventions:  She has been traveling a great deal but has now joined [facility]  Obesity Interventions: The weight is down over the past several months                                 Objective   There were no vitals filed for this visit. There is no height or weight on file to calculate BMI.       General

## 2023-08-21 ENCOUNTER — TELEPHONE (OUTPATIENT)
Dept: FAMILY MEDICINE CLINIC | Age: 73
End: 2023-08-21

## 2023-08-21 NOTE — TELEPHONE ENCOUNTER
Thomas Lockhart informed by Phone  takes vitamin d3 once in awhile. Takes 600mg calcium one a day and an additional 1/2 tab later in day. Avel Esparza

## 2023-08-21 NOTE — TELEPHONE ENCOUNTER
----- Message from Bart Ervin MD sent at 8/19/2023  8:12 PM EDT -----  Let her know the DEXA was worse from 2021. Is she on the vit D3 and if so, exactly how much. Is she taking any calcium?

## 2023-08-21 NOTE — TELEPHONE ENCOUNTER
Patient is in [place] doesn't know correct dosage of vitamin d3. She will call back tomorrow and she wants to let you know that when she was in [place] she was about to run out of evista so she would take it every other day.

## 2023-08-22 DIAGNOSIS — M85.89 OSTEOPENIA OF MULTIPLE SITES: ICD-10-CM

## 2023-08-22 DIAGNOSIS — E55.9 VITAMIN D DEFICIENCY: Primary | ICD-10-CM

## 2023-08-22 RX ORDER — RALOXIFENE HYDROCHLORIDE 60 MG/1
60 TABLET, FILM COATED ORAL DAILY
Qty: 90 TABLET | Refills: 3 | Status: SHIPPED | OUTPATIENT
Start: 2023-08-22

## 2023-08-22 NOTE — TELEPHONE ENCOUNTER
Patient called back stating that she is currently taking 2,000 Iu's sporadically. She has not been very good about taking it regularly.

## 2023-08-22 NOTE — TELEPHONE ENCOUNTER
Pt called back, she is asking if she is remain on the RX Evista? If so,   Pt req a refill of Evista.     [facility]    Please call pt once addressed

## 2023-08-22 NOTE — TELEPHONE ENCOUNTER
Ask her to take the vit D3 5000 units everyday with a meal for better absorption. Ask her to also take a full calcium 600 mg pill twice daily.  Check the vit D level in late November

## 2023-09-18 DIAGNOSIS — F33.41 RECURRENT MAJOR DEPRESSIVE DISORDER IN PARTIAL REMISSION (HCC): ICD-10-CM

## 2023-09-18 NOTE — TELEPHONE ENCOUNTER
Date of last visit:  8/11/2023  Date of next visit:  2/15/2024    Requested Prescriptions     Pending Prescriptions Disp Refills    sertraline (ZOLOFT) 50 MG tablet [Pharmacy Med Name: SERTRALINE HCL 50 MG TABLET] 90 tablet      Sig: TAKE 1 TABLET BY MOUTH EVERYDAY AT BEDTIME

## 2023-10-12 NOTE — TELEPHONE ENCOUNTER
----- Message from Carly Marina MD sent at 2/18/2018  4:20 PM EST -----  Let her know the x ray shows the arthritis being worse in the lower back. We could get another MRI of the lumbar spine to see if there is now a reason for surgery if she agrees. Patient was seen 9/15/23:    He states it felt like something was trying to come out when he was having a stool.    He states he had a lot of blood with no stool. This has happened once today.    He states this is how it has happened before.  It did not cause him pain when he pushed it back into his body.    He has not had another stool since then.  He no longer feels like something is coming out of his rectum.    He does not feel dizzy.    Please advise if he should be seen again for this?    Yaneth Holbrook RN on 10/12/2023 at 5:43 PM

## 2023-12-04 LAB
VITAMIN D 25-HYDROXY: 31.8
VITAMIN D2, 25 HYDROXY: NORMAL
VITAMIN D3,25 HYDROXY: NORMAL

## 2023-12-05 DIAGNOSIS — E55.9 VITAMIN D DEFICIENCY: ICD-10-CM

## 2023-12-06 ENCOUNTER — TELEPHONE (OUTPATIENT)
Dept: FAMILY MEDICINE CLINIC | Age: 73
End: 2023-12-06

## 2023-12-06 NOTE — TELEPHONE ENCOUNTER
----- Message from Allison Vargas MD sent at 12/5/2023 10:14 PM EST -----  The vit D level is much too low. It doesn't look like she is on any. She should be on vit D3 at 5000 units once daily with a meal for better absorption. Check the non fasting level again in early June.

## 2023-12-06 NOTE — RESULT ENCOUNTER NOTE
The vit D level is much too low. It doesn't look like she is on any. She should be on vit D3 at 5000 units once daily with a meal for better absorption. Check the non fasting level again in early June.

## 2023-12-06 NOTE — TELEPHONE ENCOUNTER
Spoke with pt and she said that she has been taking 2000 units before she goes to bed. She said that she was taking 5000 units and she had terrible joint pain. It was so terrible she could not move. She was then instructed to go back down to 2000 units. I did tell her that she needs to take the Vit D for better absorption. She was taking with anything. Pt will go to [place] faxed.

## 2023-12-07 DIAGNOSIS — E55.9 VITAMIN D DEFICIENCY: ICD-10-CM

## 2023-12-07 RX ORDER — CHOLECALCIFEROL (VITAMIN D3) 125 MCG
1 CAPSULE ORAL DAILY
COMMUNITY
Start: 2023-12-07

## 2023-12-07 NOTE — TELEPHONE ENCOUNTER
So we could try the vit D3 at 2000 units with a meal for better absorption everyday and check the non fasting level in June.

## 2023-12-14 DIAGNOSIS — I10 ESSENTIAL HYPERTENSION: ICD-10-CM

## 2023-12-14 RX ORDER — LISINOPRIL 20 MG/1
20 TABLET ORAL DAILY
Qty: 90 TABLET | Refills: 3 | Status: SHIPPED | OUTPATIENT
Start: 2023-12-14

## 2023-12-14 NOTE — TELEPHONE ENCOUNTER
Date of last visit:  8/11/2023  Date of next visit:  2/15/2024    Requested Prescriptions     Signed Prescriptions Disp Refills    lisinopril (PRINIVIL;ZESTRIL) 20 MG tablet 90 tablet 3     Sig: TAKE 1 TABLET EVERY DAY     Authorizing Provider: Moses Bowens     Ordering User: HUNTER [address] over script to Pharmacy per verbal order from Dr Carmen Mccallum.

## 2024-01-02 ENCOUNTER — HOSPITAL ENCOUNTER (OUTPATIENT)
Dept: INTERVENTIONAL RADIOLOGY/VASCULAR | Age: 74
Discharge: HOME | End: 2024-01-04
Attending: OTOLARYNGOLOGY
Payer: MEDICARE

## 2024-01-02 DIAGNOSIS — R42 DIZZINESS: ICD-10-CM

## 2024-01-02 PROCEDURE — 93880 EXTRACRANIAL BILAT STUDY: CPT

## 2024-01-10 ENCOUNTER — HOSPITAL ENCOUNTER (OUTPATIENT)
Dept: MRI IMAGING | Age: 74
Discharge: HOME | End: 2024-01-10
Attending: OTOLARYNGOLOGY
Payer: MEDICARE

## 2024-01-10 ENCOUNTER — APPOINTMENT (OUTPATIENT)
Dept: MRI IMAGING | Age: 74
End: 2024-01-10
Attending: OTOLARYNGOLOGY
Payer: MEDICARE

## 2024-01-10 DIAGNOSIS — H91.8X9: ICD-10-CM

## 2024-01-10 LAB — POC CREATININE WHOLE BLOOD: 0.8 MG/DL (ref 0.5–1.2)

## 2024-01-10 PROCEDURE — 70553 MRI BRAIN STEM W/O & W/DYE: CPT

## 2024-01-10 PROCEDURE — A9579 GAD-BASE MR CONTRAST NOS,1ML: HCPCS | Performed by: OTOLARYNGOLOGY

## 2024-01-10 PROCEDURE — 6360000004 HC RX CONTRAST MEDICATION: Performed by: OTOLARYNGOLOGY

## 2024-01-10 PROCEDURE — 82565 ASSAY OF CREATININE: CPT

## 2024-01-10 RX ADMIN — GADOTERIDOL 15 ML: 279.3 INJECTION, SOLUTION INTRAVENOUS at 14:29

## 2024-02-04 NOTE — PATIENT INSTRUCTIONS
IMPROVING    Plan:  - Nephrology consulted, appreciate recommendations.  - Fluid restriction.   Personalized Preventive Plan for Kobi Horne - 7/14/2022  Medicare offers a range of preventive health benefits. Some of the tests and screenings are paid in full while other may be subject to a deductible, co-insurance, and/or copay. Some of these benefits include a comprehensive review of your medical history including lifestyle, illnesses that may run in your family, and various assessments and screenings as appropriate. After reviewing your medical record and screening and assessments performed today your provider may have ordered immunizations, labs, imaging, and/or referrals for you. A list of these orders (if applicable) as well as your Preventive Care list are included within your After Visit Summary for your review. Other Preventive Recommendations:    · A preventive eye exam performed by an eye specialist is recommended every 1-2 years to screen for glaucoma; cataracts, macular degeneration, and other eye disorders. · A preventive dental visit is recommended every 6 months. · Try to get at least 150 minutes of exercise per week or 10,000 steps per day on a pedometer . · Order or download the FREE \"Exercise & Physical Activity: Your Everyday Guide\" from [employer]. Call 4-501.940.3332 or search [employer] online. · You need 1465-2678 mg of calcium and 5633-4002 IU of vitamin D per day. It is possible to meet your calcium requirement with diet alone, but a vitamin D supplement is usually necessary to meet this goal.  · When exposed to the sun, use a sunscreen that protects against both UVA and UVB radiation with an SPF of 30 or greater. Reapply every 2 to 3 hours or after sweating, drying off with a towel, or swimming. · Always wear a seat belt when traveling in a car. Always wear a helmet when riding a bicycle or motorcycle.

## 2024-03-20 ENCOUNTER — OFFICE VISIT (OUTPATIENT)
Dept: FAMILY MEDICINE CLINIC | Age: 74
End: 2024-03-20

## 2024-03-20 VITALS
BODY MASS INDEX: 35.68 KG/M2 | HEART RATE: 74 BPM | DIASTOLIC BLOOD PRESSURE: 70 MMHG | SYSTOLIC BLOOD PRESSURE: 130 MMHG | RESPIRATION RATE: 14 BRPM | HEIGHT: 61 IN | WEIGHT: 189 LBS

## 2024-03-20 DIAGNOSIS — Z23 NEED FOR PROPHYLACTIC VACCINATION AGAINST STREPTOCOCCUS PNEUMONIAE (PNEUMOCOCCUS): ICD-10-CM

## 2024-03-20 DIAGNOSIS — F33.41 RECURRENT MAJOR DEPRESSIVE DISORDER IN PARTIAL REMISSION (HCC): ICD-10-CM

## 2024-03-20 DIAGNOSIS — R73.03 PRE-DIABETES: Primary | ICD-10-CM

## 2024-03-20 DIAGNOSIS — E66.01 SEVERE OBESITY (BMI 35.0-39.9) WITH COMORBIDITY (HCC): ICD-10-CM

## 2024-03-20 DIAGNOSIS — M46.1 SACROILIAC INFLAMMATION (HCC): ICD-10-CM

## 2024-03-20 LAB
CHP ED QC CHECK: ABNORMAL
GLUCOSE BLD-MCNC: 152 MG/DL
HBA1C MFR BLD: 6.3 %

## 2024-03-20 PROCEDURE — 90677 PCV20 VACCINE IM: CPT | Performed by: FAMILY MEDICINE

## 2024-03-20 PROCEDURE — 82962 GLUCOSE BLOOD TEST: CPT | Performed by: FAMILY MEDICINE

## 2024-03-20 PROCEDURE — 83036 HEMOGLOBIN GLYCOSYLATED A1C: CPT | Performed by: FAMILY MEDICINE

## 2024-03-20 PROCEDURE — G0009 ADMIN PNEUMOCOCCAL VACCINE: HCPCS | Performed by: FAMILY MEDICINE

## 2024-03-20 PROCEDURE — 1123F ACP DISCUSS/DSCN MKR DOCD: CPT | Performed by: FAMILY MEDICINE

## 2024-03-20 PROCEDURE — 99213 OFFICE O/P EST LOW 20 MIN: CPT | Performed by: FAMILY MEDICINE

## 2024-03-20 PROCEDURE — G8427 DOCREV CUR MEDS BY ELIG CLIN: HCPCS | Performed by: FAMILY MEDICINE

## 2024-03-20 PROCEDURE — 3017F COLORECTAL CA SCREEN DOC REV: CPT | Performed by: FAMILY MEDICINE

## 2024-03-20 ASSESSMENT — PATIENT HEALTH QUESTIONNAIRE - PHQ9
10. IF YOU CHECKED OFF ANY PROBLEMS, HOW DIFFICULT HAVE THESE PROBLEMS MADE IT FOR YOU TO DO YOUR WORK, TAKE CARE OF THINGS AT HOME, OR GET ALONG WITH OTHER PEOPLE: NOT DIFFICULT AT ALL
SUM OF ALL RESPONSES TO PHQ9 QUESTIONS 1 & 2: 0
2. FEELING DOWN, DEPRESSED OR HOPELESS: NOT AT ALL
8. MOVING OR SPEAKING SO SLOWLY THAT OTHER PEOPLE COULD HAVE NOTICED. OR THE OPPOSITE, BEING SO FIGETY OR RESTLESS THAT YOU HAVE BEEN MOVING AROUND A LOT MORE THAN USUAL: NOT AT ALL
7. TROUBLE CONCENTRATING ON THINGS, SUCH AS READING THE NEWSPAPER OR WATCHING TELEVISION: NOT AT ALL
SUM OF ALL RESPONSES TO PHQ QUESTIONS 1-9: 0
9. THOUGHTS THAT YOU WOULD BE BETTER OFF DEAD, OR OF HURTING YOURSELF: NOT AT ALL
3. TROUBLE FALLING OR STAYING ASLEEP: NOT AT ALL
SUM OF ALL RESPONSES TO PHQ QUESTIONS 1-9: 0
5. POOR APPETITE OR OVEREATING: NOT AT ALL
1. LITTLE INTEREST OR PLEASURE IN DOING THINGS: NOT AT ALL
4. FEELING TIRED OR HAVING LITTLE ENERGY: NOT AT ALL
SUM OF ALL RESPONSES TO PHQ QUESTIONS 1-9: 0
6. FEELING BAD ABOUT YOURSELF - OR THAT YOU ARE A FAILURE OR HAVE LET YOURSELF OR YOUR FAMILY DOWN: NOT AT ALL
SUM OF ALL RESPONSES TO PHQ QUESTIONS 1-9: 0

## 2024-03-20 ASSESSMENT — ENCOUNTER SYMPTOMS
SINUS PRESSURE: 0
CONSTIPATION: 0
SHORTNESS OF BREATH: 0

## 2024-03-20 NOTE — PROGRESS NOTES
Jesi Rivera (:  1950) is a 73 y.o. female,Established patient, here for evaluation of the following chief complaint(s):  Hypertension and Hyperlipidemia         ASSESSMENT/PLAN:   Diagnosis Orders   1. Pre-diabetes  POCT glycosylated hemoglobin (Hb A1C)    POCT Glucose      2. Sacroiliac inflammation (HCC)        3. Recurrent major depressive disorder in partial remission (HCC)        4. Need for prophylactic vaccination against Streptococcus pneumoniae (pneumococcus)  Pneumococcal, PCV20, PREVNAR 20, (age 6w+), IM, PF      5. Severe obesity (BMI 35.0-39.9) with comorbidity (HCC)             Let me know if at some point you would want to try a different antidepressant.  Continue watching the diet and consider seeing the podiatrist for new orthotics  See me in 6 months    Subjective   SUBJECTIVE/OBJECTIVE:  HPI  We discussed the A1c being up some and the need for diet and exercise  Her feet bother her and she wears orthotics  There weight is increasing and her diet isn't helping as before.  The depression is fair and she wonders if the zoloft could be causing the weight gain  Review of Systems   Constitutional:  Negative for fatigue.   HENT:  Negative for sinus pressure.    Eyes:  Negative for visual disturbance.   Respiratory:  Negative for shortness of breath.    Cardiovascular:  Negative for chest pain.   Gastrointestinal:  Negative for constipation.   Genitourinary: Negative.    Musculoskeletal:  Negative for arthralgias.   Skin:  Negative for rash.   Neurological:  Negative for headaches.    The patient's medications, allergies, past medical problems, surgical, social, and family histories were reviewed and updated as needed.      Objective   Physical Exam  Constitutional:       Appearance: Normal appearance. She is well-developed.   HENT:      Head: Normocephalic and atraumatic.   Eyes:      General: No scleral icterus.     Conjunctiva/sclera: Conjunctivae normal.   Neck:      Trachea: No tracheal

## 2024-03-20 NOTE — PROGRESS NOTES
Immunizations Administered       Name Date Dose Route    Pneumococcal, PCV20, PREVNAR 20, (age 6w+), IM, 0.5mL 3/20/2024 0.5 mL Intramuscular    Site: Deltoid- Right    Lot: [id number]    NDC: 9352-0767-41

## 2024-03-20 NOTE — PATIENT INSTRUCTIONS
Let me know if at some point you would want to try a different antidepressant.  Continue watching the diet and consider seeing the podiatrist for new orthotics  See me in 6 months

## 2024-04-04 ENCOUNTER — TELEPHONE (OUTPATIENT)
Dept: FAMILY MEDICINE CLINIC | Age: 74
End: 2024-04-04

## 2024-04-04 RX ORDER — AZITHROMYCIN 250 MG/1
TABLET, FILM COATED ORAL
Qty: 6 TABLET | Refills: 0 | Status: SHIPPED | OUTPATIENT
Start: 2024-04-04 | End: 2024-04-14

## 2024-04-04 NOTE — TELEPHONE ENCOUNTER
Pt called stating she has a fever cough chest tightness headache she is asking for an z pack pt stated the same thing was called in for spouse yesterday     Please send to [facility] [address]

## 2024-06-03 LAB
VITAMIN D 25-HYDROXY: 23.9
VITAMIN D2, 25 HYDROXY: NORMAL
VITAMIN D3,25 HYDROXY: NORMAL

## 2024-06-04 DIAGNOSIS — E55.9 VITAMIN D DEFICIENCY: ICD-10-CM

## 2024-06-06 ENCOUNTER — TELEPHONE (OUTPATIENT)
Dept: FAMILY MEDICINE CLINIC | Age: 74
End: 2024-06-06

## 2024-06-06 DIAGNOSIS — E55.9 VITAMIN D DEFICIENCY: Primary | ICD-10-CM

## 2024-06-06 RX ORDER — GLUCOSAMINE HCL 500 MG
1 TABLET ORAL DAILY
COMMUNITY
Start: 2024-06-06

## 2024-06-06 NOTE — RESULT ENCOUNTER NOTE
Pt c/o chest heaviness x 1hr - denies pain currently. Also c/o SOB. Denies cardiac hx. The vit D was very low. Is she still on the vit D3 at 2000 units once daily?

## 2024-06-06 NOTE — TELEPHONE ENCOUNTER
Patient informed but stated that she had lots of bone and joint pain when she took 5,000 in the past.  She even tried to spread it out during the day and she still had the aching and feeling like she didn't want to do anything.    Please call her back with recommendations.

## 2024-06-06 NOTE — TELEPHONE ENCOUNTER
Let her know that the vit D3 should be 5000 units once daily with a meal. Check the non fasting level again in early December

## 2024-06-06 NOTE — TELEPHONE ENCOUNTER
----- Message from García Miramontes MD sent at 6/5/2024  8:49 PM EDT -----  The vit D was very low. Is she still on the vit D3 at 2000 units once daily?

## 2024-06-27 DIAGNOSIS — I49.3 VENTRICULAR PREMATURE DEPOLARIZATION: ICD-10-CM

## 2024-06-27 RX ORDER — METOPROLOL SUCCINATE 25 MG/1
25 TABLET, EXTENDED RELEASE ORAL
Qty: 90 TABLET | Refills: 3 | Status: SHIPPED | OUTPATIENT
Start: 2024-06-27 | End: 2024-09-25

## 2024-06-27 NOTE — TELEPHONE ENCOUNTER
The pharmacy is  requesting a refill of the below medication which has been pended for you:     Requested Prescriptions     Pending Prescriptions Disp Refills    metoprolol succinate (TOPROL XL) 25 MG extended release tablet [Pharmacy Med Name: METOPROLOL SUCC ER 25 MG TAB] 90 tablet 3     Sig: TAKE 1 TABLET BY MOUTH EVERY DAY FOR 90 DAYS       Last Appointment Date: 3/20/2024  Next Appointment Date: 9/23/2024    Allergies   Allergen Reactions    Neomycin Swelling     Ear drop caused swelling of the canal    Pcn [Penicillins] Rash

## 2024-07-24 ENCOUNTER — OFFICE VISIT (OUTPATIENT)
Dept: FAMILY MEDICINE CLINIC | Age: 74
End: 2024-07-24

## 2024-07-24 VITALS
HEIGHT: 61 IN | WEIGHT: 191.5 LBS | DIASTOLIC BLOOD PRESSURE: 60 MMHG | SYSTOLIC BLOOD PRESSURE: 130 MMHG | RESPIRATION RATE: 16 BRPM | BODY MASS INDEX: 36.15 KG/M2 | HEART RATE: 64 BPM

## 2024-07-24 DIAGNOSIS — M77.52 TENDONITIS OF ANKLE, LEFT: Primary | ICD-10-CM

## 2024-07-24 PROCEDURE — G8399 PT W/DXA RESULTS DOCUMENT: HCPCS | Performed by: FAMILY MEDICINE

## 2024-07-24 PROCEDURE — G8417 CALC BMI ABV UP PARAM F/U: HCPCS | Performed by: FAMILY MEDICINE

## 2024-07-24 PROCEDURE — 1123F ACP DISCUSS/DSCN MKR DOCD: CPT | Performed by: FAMILY MEDICINE

## 2024-07-24 PROCEDURE — 99213 OFFICE O/P EST LOW 20 MIN: CPT | Performed by: FAMILY MEDICINE

## 2024-07-24 PROCEDURE — 3017F COLORECTAL CA SCREEN DOC REV: CPT | Performed by: FAMILY MEDICINE

## 2024-07-24 PROCEDURE — 1090F PRES/ABSN URINE INCON ASSESS: CPT | Performed by: FAMILY MEDICINE

## 2024-07-24 PROCEDURE — 1036F TOBACCO NON-USER: CPT | Performed by: FAMILY MEDICINE

## 2024-07-24 PROCEDURE — G8427 DOCREV CUR MEDS BY ELIG CLIN: HCPCS | Performed by: FAMILY MEDICINE

## 2024-07-24 RX ORDER — PREDNISONE 20 MG/1
20 TABLET ORAL DAILY
Qty: 7 TABLET | Refills: 0 | Status: SHIPPED | OUTPATIENT
Start: 2024-07-24 | End: 2024-07-31

## 2024-07-24 ASSESSMENT — ENCOUNTER SYMPTOMS
CONSTIPATION: 0
SINUS PRESSURE: 0
SHORTNESS OF BREATH: 0

## 2024-07-24 NOTE — PATIENT INSTRUCTIONS
See Dr Khoury or Dr San if the soreness is not fixed by the prednisone  See me in September still

## 2024-07-24 NOTE — PROGRESS NOTES
pulse 64, resp. rate 16, height 1.549 m (5' 1\"), weight 86.9 kg (191 lb 8 oz), not currently breastfeeding.               An electronic signature was used to authenticate this note.    --García Miramontes MD

## 2024-08-29 DIAGNOSIS — M85.89 OSTEOPENIA OF MULTIPLE SITES: ICD-10-CM

## 2024-08-29 RX ORDER — RALOXIFENE HYDROCHLORIDE 60 MG/1
60 TABLET, FILM COATED ORAL DAILY
Qty: 90 TABLET | Refills: 3 | Status: SHIPPED | OUTPATIENT
Start: 2024-08-29

## 2024-08-29 NOTE — TELEPHONE ENCOUNTER
Pt called requesting 90 day Rx for Raloxifene 60 mg one tablet daily    [address]    Reminded pt of appt here on 9/24/24

## 2024-08-29 NOTE — TELEPHONE ENCOUNTER
Date of last visit:  7/24/2024  Date of next visit:  9/24/2024    Requested Prescriptions     Pending Prescriptions Disp Refills    raloxifene (EVISTA) 60 MG tablet 90 tablet 1     Sig: Take 1 tablet by mouth daily

## 2024-08-31 NOTE — PROGRESS NOTES
1421: Patient to phase 2 recovery room via cart. Patient is awake and alert. Report received from surgical RN, Edgard Koo. Patient's vitals obtained, see charting. 1423: Patient is denying pain and nausea at this time. Incision site is clean, dry and intact- see LDA. 1430: Offered drink and snack provided to the patient. Bed is in the lowest position, call light is within reach and patient's  brought to the room. 1448: Discharge instructions given and explained to the patient and the patient's , both verbalized understanding. 1450: IV removed at this time, no complications and dressing applied. 1456: Patient ambulated to the car and discharged home in stable condition with her . [place] Lovenox    Asim Sánchez MD

## 2024-09-12 DIAGNOSIS — F33.41 RECURRENT MAJOR DEPRESSIVE DISORDER IN PARTIAL REMISSION (HCC): ICD-10-CM

## 2024-10-03 ENCOUNTER — TELEPHONE (OUTPATIENT)
Dept: FAMILY MEDICINE CLINIC | Age: 74
End: 2024-10-03

## 2024-10-03 ENCOUNTER — OFFICE VISIT (OUTPATIENT)
Dept: FAMILY MEDICINE CLINIC | Age: 74
End: 2024-10-03

## 2024-10-03 VITALS
DIASTOLIC BLOOD PRESSURE: 80 MMHG | WEIGHT: 187 LBS | HEIGHT: 61 IN | BODY MASS INDEX: 35.3 KG/M2 | SYSTOLIC BLOOD PRESSURE: 130 MMHG | HEART RATE: 70 BPM | RESPIRATION RATE: 16 BRPM

## 2024-10-03 DIAGNOSIS — R73.03 PRE-DIABETES: Primary | ICD-10-CM

## 2024-10-03 DIAGNOSIS — I10 ESSENTIAL HYPERTENSION: ICD-10-CM

## 2024-10-03 DIAGNOSIS — Z00.00 MEDICARE ANNUAL WELLNESS VISIT, SUBSEQUENT: ICD-10-CM

## 2024-10-03 DIAGNOSIS — Z12.31 VISIT FOR SCREENING MAMMOGRAM: ICD-10-CM

## 2024-10-03 LAB
CHP ED QC CHECK: NORMAL
GLUCOSE BLD-MCNC: 104 MG/DL
HBA1C MFR BLD: 6 %

## 2024-10-03 ASSESSMENT — PATIENT HEALTH QUESTIONNAIRE - PHQ9
2. FEELING DOWN, DEPRESSED OR HOPELESS: NOT AT ALL
10. IF YOU CHECKED OFF ANY PROBLEMS, HOW DIFFICULT HAVE THESE PROBLEMS MADE IT FOR YOU TO DO YOUR WORK, TAKE CARE OF THINGS AT HOME, OR GET ALONG WITH OTHER PEOPLE: NOT DIFFICULT AT ALL
9. THOUGHTS THAT YOU WOULD BE BETTER OFF DEAD, OR OF HURTING YOURSELF: NOT AT ALL
6. FEELING BAD ABOUT YOURSELF - OR THAT YOU ARE A FAILURE OR HAVE LET YOURSELF OR YOUR FAMILY DOWN: NOT AT ALL
SUM OF ALL RESPONSES TO PHQ QUESTIONS 1-9: 3
3. TROUBLE FALLING OR STAYING ASLEEP: MORE THAN HALF THE DAYS
4. FEELING TIRED OR HAVING LITTLE ENERGY: SEVERAL DAYS
SUM OF ALL RESPONSES TO PHQ QUESTIONS 1-9: 3
8. MOVING OR SPEAKING SO SLOWLY THAT OTHER PEOPLE COULD HAVE NOTICED. OR THE OPPOSITE, BEING SO FIGETY OR RESTLESS THAT YOU HAVE BEEN MOVING AROUND A LOT MORE THAN USUAL: NOT AT ALL
SUM OF ALL RESPONSES TO PHQ QUESTIONS 1-9: 3
1. LITTLE INTEREST OR PLEASURE IN DOING THINGS: NOT AT ALL
SUM OF ALL RESPONSES TO PHQ9 QUESTIONS 1 & 2: 0
SUM OF ALL RESPONSES TO PHQ QUESTIONS 1-9: 3
7. TROUBLE CONCENTRATING ON THINGS, SUCH AS READING THE NEWSPAPER OR WATCHING TELEVISION: NOT AT ALL
5. POOR APPETITE OR OVEREATING: NOT AT ALL

## 2024-10-03 ASSESSMENT — LIFESTYLE VARIABLES
HOW OFTEN DO YOU HAVE A DRINK CONTAINING ALCOHOL: NEVER
HOW MANY STANDARD DRINKS CONTAINING ALCOHOL DO YOU HAVE ON A TYPICAL DAY: PATIENT DOES NOT DRINK

## 2024-10-03 NOTE — TELEPHONE ENCOUNTER
Mammogram scheduled for 10/15/2024 at [facility].  Patient to arrive by 815am for 830am.  No katherin lotion or perfume.  MOM to return call to office   faxed order to [facility]

## 2024-10-03 NOTE — PATIENT INSTRUCTIONS
breath.     Pain, pressure, or a strange feeling in the back, neck, jaw, or upper belly or in one or both shoulders or arms.     Lightheadedness or sudden weakness.     A fast or irregular heartbeat.   After you call 911, the  may tell you to chew 1 adult-strength or 2 to 4 low-dose aspirin. Wait for an ambulance. Do not try to drive yourself.  Watch closely for changes in your health, and be sure to contact your doctor if you have any problems.  Where can you learn more?  Go to https://[email or web address].net/patientEd and enter F075 to learn more about \"A Healthy Heart: Care Instructions.\"  Current as of: June 24, 2023  Content Version: 14.2  © 2024 [employer].   Care instructions adapted under license by [employer]. If you have questions about a medical condition or this instruction, always ask your healthcare professional. [employer] disclaims any warranty or liability for your use of this information.      Personalized Preventive Plan for Jesi Rivera - 10/3/2024  Medicare offers a range of preventive health benefits. Some of the tests and screenings are paid in full while other may be subject to a deductible, co-insurance, and/or copay.    Some of these benefits include a comprehensive review of your medical history including lifestyle, illnesses that may run in your family, and various assessments and screenings as appropriate.    After reviewing your medical record and screening and assessments performed today your provider may have ordered immunizations, labs, imaging, and/or referrals for you.  A list of these orders (if applicable) as well as your Preventive Care list are included within your After Visit Summary for your review.    Other Preventive Recommendations:    A preventive eye exam performed by an eye specialist is recommended every 1-2 years to screen for glaucoma; cataracts, macular degeneration, and other eye disorders.  A preventive dental visit is recommended

## 2024-10-03 NOTE — PROGRESS NOTES
ACID) 500 MG tablet Take 1 tablet by mouth 2 times daily  Nathaniel Bustamante MD   amLODIPine (NORVASC) 2.5 MG tablet Take 1 tablet by mouth daily  García Miramontes MD   acetaminophen (AMINOFEN) 325 MG tablet Take 2 tablets by mouth every 6 hours as needed for Pain  Vinay Roque, APRN - CNP   ferrous sulfate (IRON 325) 325 (65 Fe) MG tablet Take 1 tablet by mouth daily (with breakfast)  ProviderNathaniel MD   vitamin B-12 (CYANOCOBALAMIN) 1000 MCG tablet Take 1 tablet by mouth daily  Nathaniel Bustamante MD   simvastatin (ZOCOR) 20 MG tablet TAKE 1 TABLET BY MOUTH EVERY DAY  García Miramontes MD   aspirin 81 MG tablet Take 1 tablet by mouth daily  Nathaniel Bustamante MD   calcium carbonate 600 MG TABS tablet Take 1 tablet by mouth 2 times daily  Provider, Historical, MD       CareTeam (Including outside providers/suppliers regularly involved in providing care):   Patient Care Team:  García Miramontes MD as PCP - General (Family Medicine)  García Miramontes MD as PCP - Empaneled Provider  Albin Oneill MD as Consulting Physician (Gastroenterology)  Harris Mayers MD as Consulting Physician (Orthopedic Surgery)  Kala Mclain DO as Consulting Physician (Cardiology)      Reviewed and updated this visit:  Tobacco  Allergies  Meds  Med Hx  Surg Hx  Soc Hx  Fam Hx

## 2024-10-10 ENCOUNTER — TELEPHONE (OUTPATIENT)
Dept: FAMILY MEDICINE CLINIC | Age: 74
End: 2024-10-10

## 2024-10-10 DIAGNOSIS — M46.1 SACROILIAC INFLAMMATION (HCC): Primary | ICD-10-CM

## 2024-10-10 DIAGNOSIS — M54.50 RECURRENT LOW BACK PAIN: ICD-10-CM

## 2024-10-10 NOTE — TELEPHONE ENCOUNTER
Pt called too ask for an new referral for  at [facility]     Please call pt once addressed [phone]

## 2024-11-04 ASSESSMENT — ENCOUNTER SYMPTOMS
COLOR CHANGE: 0
BACK PAIN: 1

## 2024-11-05 ENCOUNTER — OFFICE VISIT (OUTPATIENT)
Dept: PHYSICAL MEDICINE AND REHAB | Age: 74
End: 2024-11-05
Payer: MEDICARE

## 2024-11-05 VITALS
DIASTOLIC BLOOD PRESSURE: 66 MMHG | SYSTOLIC BLOOD PRESSURE: 126 MMHG | WEIGHT: 187 LBS | HEIGHT: 61 IN | BODY MASS INDEX: 35.3 KG/M2

## 2024-11-05 DIAGNOSIS — Z98.1 HISTORY OF LUMBAR FUSION: ICD-10-CM

## 2024-11-05 DIAGNOSIS — M54.17 LUMBOSACRAL RADICULITIS: ICD-10-CM

## 2024-11-05 DIAGNOSIS — M47.816 LUMBAR FACET ARTHROPATHY: ICD-10-CM

## 2024-11-05 DIAGNOSIS — M53.3 SACROILIAC JOINT DYSFUNCTION: ICD-10-CM

## 2024-11-05 DIAGNOSIS — M46.1 SI (SACROILIAC) JOINT INFLAMMATION (HCC): Primary | ICD-10-CM

## 2024-11-05 DIAGNOSIS — M47.816 LUMBAR SPONDYLOSIS: ICD-10-CM

## 2024-11-05 DIAGNOSIS — Z98.890 HISTORY OF LUMBAR LAMINECTOMY: ICD-10-CM

## 2024-11-05 PROCEDURE — 1125F AMNT PAIN NOTED PAIN PRSNT: CPT | Performed by: NURSE PRACTITIONER

## 2024-11-05 PROCEDURE — G8428 CUR MEDS NOT DOCUMENT: HCPCS | Performed by: NURSE PRACTITIONER

## 2024-11-05 PROCEDURE — 3078F DIAST BP <80 MM HG: CPT | Performed by: NURSE PRACTITIONER

## 2024-11-05 PROCEDURE — 1123F ACP DISCUSS/DSCN MKR DOCD: CPT | Performed by: NURSE PRACTITIONER

## 2024-11-05 PROCEDURE — G8399 PT W/DXA RESULTS DOCUMENT: HCPCS | Performed by: NURSE PRACTITIONER

## 2024-11-05 PROCEDURE — 3074F SYST BP LT 130 MM HG: CPT | Performed by: NURSE PRACTITIONER

## 2024-11-05 PROCEDURE — G8484 FLU IMMUNIZE NO ADMIN: HCPCS | Performed by: NURSE PRACTITIONER

## 2024-11-05 PROCEDURE — 1090F PRES/ABSN URINE INCON ASSESS: CPT | Performed by: NURSE PRACTITIONER

## 2024-11-05 PROCEDURE — G8417 CALC BMI ABV UP PARAM F/U: HCPCS | Performed by: NURSE PRACTITIONER

## 2024-11-05 PROCEDURE — 99214 OFFICE O/P EST MOD 30 MIN: CPT | Performed by: NURSE PRACTITIONER

## 2024-11-05 PROCEDURE — 1036F TOBACCO NON-USER: CPT | Performed by: NURSE PRACTITIONER

## 2024-11-05 PROCEDURE — 3017F COLORECTAL CA SCREEN DOC REV: CPT | Performed by: NURSE PRACTITIONER

## 2024-11-05 NOTE — PROGRESS NOTES
Functionality Assessment/Goals Worksheet     On a scale of 0 (Does not Interfere) to 10 (Completely Interferes)     1.  Which number describes how during the past week pain has interfered with           the following:  A.  General Activity:  7  B.  Mood: 8  C.  Walking Ability:  3  D.  Normal Work (Includes both work outside the home and housework):  8  E.  Relations with Other People:   8  F.  Sleep:   6  G.  Enjoyment of Life:   6    2.  Patient Prefers to Take their Pain Medications:     []  On a regular basis   []  Only when necessary    []  Does not take pain medications    3.  What are the Patient's Goals/Expectations for Visiting Pain Management?     [x]  Learn about my pain    []  Receive Medication   []  Physical Therapy     []  Treat Depression   [x]  Receive Injections    []  Treat Sleep   []  Deal with Anxiety and Stress   []  Treat Opoid Dependence/Addiction   []  Other:                                
content normal.         Judgment: Judgment normal.       ANN  Patricks test  positive  Yeoman's  or Gaenslen's positive  Kemps  positive  Spurlings  na  Duenas's na         Assessment:     1. SI (sacroiliac) joint inflammation (HCC)    2. Sacroiliac joint dysfunction    3. Lumbosacral radiculitis    4. Lumbar facet arthropathy    5. Lumbar spondylosis    6. History of lumbar fusion    7. History of lumbar laminectomy           Assessment & Plan   Plan:      OARRS reviewed. Current MED:  Patient was not offered naloxone for home.   Testing Labs or Radiology reviewed: Lumbar MRI   Reviewed Ortho Neuro surgical notes   Testing Labs or Radiology ordered:will obtain post op MRI from Dr Frances office  Procedures. Bilateral Sacroiliac Diagnostic injection  under Fluoroscopy F/U Bhargavi  Discussed with patient about risks with procedure including infection, reaction to medication, increased pain, or bleeding.  Medications:  Discussed Lumbar facet MBB L3-4 and L5-S1 above nad below surgical level if need  If patient is on blood thinners will need approval to hold yes or no:ASA per PCP prophylactic   Does patient have implanted devices? Stimulators, AICD or Pacemaker:N/A      Meds. Prescribed:   No orders of the defined types were placed in this encounter.      Return for Bilateral Sacroiliac Diagnostic injection  under Fluoroscopy F/U Bhargavi.       Electronically signed by SHERRY Varghese CNP on11/5/2024 at 10:48 AM

## 2024-11-27 DIAGNOSIS — I10 ESSENTIAL HYPERTENSION: ICD-10-CM

## 2024-11-27 RX ORDER — LISINOPRIL 20 MG/1
20 TABLET ORAL DAILY
Qty: 90 TABLET | Refills: 3 | Status: SHIPPED | OUTPATIENT
Start: 2024-11-27

## 2024-11-27 NOTE — TELEPHONE ENCOUNTER
Date of last visit:  10/3/2024  Date of next visit:  4/7/2025    Requested Prescriptions     Pending Prescriptions Disp Refills    lisinopril (PRINIVIL;ZESTRIL) 20 MG tablet [Pharmacy Med Name: LISINOPRIL 20 MG TABLET] 90 tablet 3     Sig: TAKE 1 TABLET DAILY

## 2024-12-10 DIAGNOSIS — I10 ESSENTIAL HYPERTENSION: ICD-10-CM

## 2024-12-10 RX ORDER — AMLODIPINE BESYLATE 2.5 MG/1
2.5 TABLET ORAL DAILY
Qty: 90 TABLET | Refills: 3 | Status: SHIPPED | OUTPATIENT
Start: 2024-12-10 | End: 2025-12-10

## 2024-12-10 NOTE — TELEPHONE ENCOUNTER
Date of last visit:  10/3/2024  Date of next visit:  4/7/2025    Requested Prescriptions     Pending Prescriptions Disp Refills    amLODIPine (NORVASC) 2.5 MG tablet 90 tablet 0     Sig: Take 1 tablet by mouth daily

## 2024-12-13 NOTE — DISCHARGE INSTRUCTIONS
Post procedure Instructions:    No driving or making significant decisions for the remainder of the day.   Be cautions with walking and activity for 24 hours, do not over exert yourself.  Ok to resume pre-procedure activity level today.  Apply ice to site of injection site if you have pain or discomfort.  Do not submerge sit of injection during bath or pool activities for 48 hours post-procedure.  Resume blood thinning medications in 24 hours.     Call office [phone] if you have:  Temperature greater than 100.4  Persistent nausea and vomiting  Severe uncontrolled pain  Redness, tenderness, or signs of infection (pain, swelling, redness, odor or green/yellow discharge around the site)  Difficulty breathing, headache or visual disturbances  Hives  Persistent dizziness or light-headedness  Extreme fatigue  Any other questions or concerns you may have after discharge    In an emergency, call 911 or go to an Emergency Department at a nearby hospital    “Surgical Site Infections”      How can we work together to prevent Surgical Site Infections?   We would like to thank you for choosing [facility] for your Surgical Care.  Below you will find helpful information on how we can work together to prevent Surgical Site Infections.    What is a Surgical Site Infection (SSI)?  A surgical site infection is an infection that occurs after surgery in the part of the body where the surgery took place. Most patients who have surgery do not develop an infection. However, infections develop in about 1 to 3 out of every 100 patients who have surgery.  Some of the common symptoms of a surgical site infection are:  Redness and pain around the area where you had surgery  Drainage of cloudy fluid from your surgical wound  Fever    Can SSIs be treated?  Yes. Most surgical site infections can be treated with antibiotics. The antibiotic given to you depends on the bacteria (germs) causing the infection. Sometimes patients with

## 2024-12-16 ENCOUNTER — TELEPHONE (OUTPATIENT)
Dept: FAMILY MEDICINE CLINIC | Age: 74
End: 2024-12-16

## 2024-12-16 RX ORDER — AZITHROMYCIN 250 MG/1
TABLET, FILM COATED ORAL
Qty: 6 TABLET | Refills: 0 | Status: ON HOLD | OUTPATIENT
Start: 2024-12-16 | End: 2024-12-17

## 2024-12-16 ASSESSMENT — ENCOUNTER SYMPTOMS
COLOR CHANGE: 0
BACK PAIN: 1

## 2024-12-16 NOTE — TELEPHONE ENCOUNTER
Jesi called and has not been feeling well:    Symptoms: Deep Cough, Head congestion  When did Symptoms start: 12/15/24  Taking any OTC Medications to help Symptoms: robitussin  Covid Test: No   Are you able to do a Virtual Appointment if your PCP Recommends that: No     Pharmacy: [facility] on [address]

## 2024-12-16 NOTE — H&P
Today, patient presents for planned Bilateral Sacroiliac injection    This note is reflective of the patient's previous visit for evaluation. We will proceed with today's planned procedure. Since patient's last visit for evaluation, there have been no interval changes in medical history. Patient has no new numbness, weakness, or focal neurological deficit since evaluation. Patient has no contraindications to injection (no anticoagulation or recent antibiotic intake for active infections), and has a  present or is able to drive themselves (as discussed and cleared by physician).  Allergies to latex, contrast dye, and steroid medications have been confirmed with the patient prior to the procedure.  NPO necessity has been assessed and accepted based on procedure complexity. The risks and benefits of the procedure have been explained including but are not limited to infection, bleeding, paralysis, immediate post procedure weakness, and dizziness; the patient acknowledges understanding and desires to proceed with the procedure. Patient has signed consent for same procedure as discussed in previous clinic encounter. All other questions and concerns were addressed at bedside. See procedure note for full details.       Post procedure Instructions: The patient was advised not to drive during the day of the procedure and not to engage in any significant decision making (unless otherwise states by physician). The patient was also advised to be cautious with walking/activity for 24 hours following today's visit and asked not to engage in over-exertion (unless otherwise states by physician). After this time, it is ok to resume pre-procedure level of activity. Patient advised to apply ice to site of injection in situations of pain and discomfort. Patient advised to not submerge site of injection during bath or pool activities for approximately 24 hours post-procedure. Patient attested to understanding post procedure

## 2024-12-17 ENCOUNTER — HOSPITAL ENCOUNTER (OUTPATIENT)
Age: 74
Setting detail: OUTPATIENT SURGERY
Discharge: HOME | End: 2024-12-17
Attending: ANESTHESIOLOGY | Admitting: ANESTHESIOLOGY
Payer: MEDICARE

## 2024-12-17 ENCOUNTER — APPOINTMENT (OUTPATIENT)
Dept: GENERAL RADIOLOGY | Age: 74
End: 2024-12-17
Attending: ANESTHESIOLOGY
Payer: MEDICARE

## 2024-12-17 VITALS
RESPIRATION RATE: 16 BRPM | HEART RATE: 68 BPM | SYSTOLIC BLOOD PRESSURE: 148 MMHG | HEIGHT: 62 IN | DIASTOLIC BLOOD PRESSURE: 61 MMHG | WEIGHT: 172.8 LBS | OXYGEN SATURATION: 94 % | BODY MASS INDEX: 31.8 KG/M2 | TEMPERATURE: 97.1 F

## 2024-12-17 PROCEDURE — 6360000002 HC RX W HCPCS: Performed by: ANESTHESIOLOGY

## 2024-12-17 PROCEDURE — 27096 INJECT SACROILIAC JOINT: CPT | Performed by: ANESTHESIOLOGY

## 2024-12-17 PROCEDURE — 3600000054 HC PAIN LEVEL 3 BASE: Performed by: ANESTHESIOLOGY

## 2024-12-17 PROCEDURE — 2709999900 HC NON-CHARGEABLE SUPPLY: Performed by: ANESTHESIOLOGY

## 2024-12-17 PROCEDURE — 7100000010 HC PHASE II RECOVERY - FIRST 15 MIN: Performed by: ANESTHESIOLOGY

## 2024-12-17 RX ORDER — BUPIVACAINE HYDROCHLORIDE 5 MG/ML
INJECTION, SOLUTION PERINEURAL PRN
Status: DISCONTINUED | OUTPATIENT
Start: 2024-12-17 | End: 2024-12-17 | Stop reason: ALTCHOICE

## 2024-12-17 RX ORDER — METHYLPREDNISOLONE ACETATE 80 MG/ML
INJECTION, SUSPENSION INTRA-ARTICULAR; INTRALESIONAL; INTRAMUSCULAR; SOFT TISSUE PRN
Status: DISCONTINUED | OUTPATIENT
Start: 2024-12-17 | End: 2024-12-17 | Stop reason: ALTCHOICE

## 2024-12-17 RX ORDER — LIDOCAINE HYDROCHLORIDE 10 MG/ML
INJECTION, SOLUTION EPIDURAL; INFILTRATION; INTRACAUDAL; PERINEURAL PRN
Status: DISCONTINUED | OUTPATIENT
Start: 2024-12-17 | End: 2024-12-17 | Stop reason: ALTCHOICE

## 2024-12-17 ASSESSMENT — PAIN DESCRIPTION - DESCRIPTORS: DESCRIPTORS: ACHING

## 2024-12-17 ASSESSMENT — PAIN - FUNCTIONAL ASSESSMENT
PAIN_FUNCTIONAL_ASSESSMENT: 0-10
PAIN_FUNCTIONAL_ASSESSMENT: 0-10

## 2024-12-17 NOTE — PROGRESS NOTES
1341 Patient arrived to phase II via cart.  Spontaneous respiraitons even and unlabored.  Placed on monitor--VSS.  Report received from OR RN    1342 Assessment completed.  Patient is alert and oriented x4.   Patient denies pain--will monitor.  Injection sites clean and dry.      1343 Pt. Denies all needs at this time. Side rails up X2.  Pt states readiness for discharge.    1344 Pt. Standing at bedside. With stand by assist of RN. Pt. Denies weakness or dizziness.    1346 Pt. Getting self dressed. Family notified of discharge    1348 Pt. Ambulated to private vehicle in stable condition with stand by assist of RN.

## 2024-12-17 NOTE — PROCEDURES
Pre-operative Diagnosis:  SI joint pain     Post-operative Diagnosis:  SI joint pain     Procedure: Bilateral SI joint injection     Procedure Description:  After having signed the informed consent, the patient was placed in the prone position.  The patient's back was prepped with chloraprep solution, and draped in a sterile fashion. A total of 2 ml of 1% lidocaine were used to anesthetize the skin and underlying tissues.  Under fluoroscopic guidance a single 22-gauge, 3.5 inch spinal needle was advanced to lie within the inferior pole of the RIGHT sacroiliac joint.  There were no paresthesias or heme aspiration. Needle placement was confirmed in the AP view.  After negative aspiration, 0.5 ml of Omnipaque 300 contrast was injected with appropriate spread observed.  A total of 4 ml of 0.5% bupivicaine mixed with 40 mg depo-medrol were injected into the sacroiliac joint. The needle was withdrawn without any complications.  This exact procedure was repeated on the contralateral side. The patient tolerated the procedure well, was transported to the recovery room and observed for 15 minutes and discharged in an ambulatory fashion. No immediate reported complications.    Procedural Complications: None  Estimated Blood Loss: 0 mL    Arjun Hernández DO  Interventional Pain Management/PM&R   [facility]

## 2025-01-14 ENCOUNTER — OFFICE VISIT (OUTPATIENT)
Dept: PHYSICAL MEDICINE AND REHAB | Age: 75
End: 2025-01-14
Payer: MEDICARE

## 2025-01-14 VITALS
WEIGHT: 172 LBS | SYSTOLIC BLOOD PRESSURE: 124 MMHG | HEIGHT: 62 IN | DIASTOLIC BLOOD PRESSURE: 70 MMHG | BODY MASS INDEX: 31.65 KG/M2

## 2025-01-14 DIAGNOSIS — M46.1 SI (SACROILIAC) JOINT INFLAMMATION (HCC): Primary | ICD-10-CM

## 2025-01-14 DIAGNOSIS — M53.3 SACROILIAC JOINT DYSFUNCTION: ICD-10-CM

## 2025-01-14 DIAGNOSIS — M54.17 LUMBOSACRAL RADICULITIS: ICD-10-CM

## 2025-01-14 DIAGNOSIS — Z98.890 HISTORY OF LUMBAR LAMINECTOMY: ICD-10-CM

## 2025-01-14 DIAGNOSIS — M47.816 LUMBAR FACET ARTHROPATHY: ICD-10-CM

## 2025-01-14 DIAGNOSIS — M47.816 LUMBAR SPONDYLOSIS: ICD-10-CM

## 2025-01-14 DIAGNOSIS — Z98.1 HISTORY OF LUMBAR FUSION: ICD-10-CM

## 2025-01-14 PROCEDURE — 99214 OFFICE O/P EST MOD 30 MIN: CPT | Performed by: NURSE PRACTITIONER

## 2025-01-14 PROCEDURE — G8399 PT W/DXA RESULTS DOCUMENT: HCPCS | Performed by: NURSE PRACTITIONER

## 2025-01-14 PROCEDURE — 3017F COLORECTAL CA SCREEN DOC REV: CPT | Performed by: NURSE PRACTITIONER

## 2025-01-14 PROCEDURE — G8417 CALC BMI ABV UP PARAM F/U: HCPCS | Performed by: NURSE PRACTITIONER

## 2025-01-14 PROCEDURE — 3074F SYST BP LT 130 MM HG: CPT | Performed by: NURSE PRACTITIONER

## 2025-01-14 PROCEDURE — 1036F TOBACCO NON-USER: CPT | Performed by: NURSE PRACTITIONER

## 2025-01-14 PROCEDURE — 1123F ACP DISCUSS/DSCN MKR DOCD: CPT | Performed by: NURSE PRACTITIONER

## 2025-01-14 PROCEDURE — 1159F MED LIST DOCD IN RCRD: CPT | Performed by: NURSE PRACTITIONER

## 2025-01-14 PROCEDURE — 1090F PRES/ABSN URINE INCON ASSESS: CPT | Performed by: NURSE PRACTITIONER

## 2025-01-14 PROCEDURE — 1125F AMNT PAIN NOTED PAIN PRSNT: CPT | Performed by: NURSE PRACTITIONER

## 2025-01-14 PROCEDURE — 3078F DIAST BP <80 MM HG: CPT | Performed by: NURSE PRACTITIONER

## 2025-01-14 PROCEDURE — G8427 DOCREV CUR MEDS BY ELIG CLIN: HCPCS | Performed by: NURSE PRACTITIONER

## 2025-01-14 ASSESSMENT — ENCOUNTER SYMPTOMS
BACK PAIN: 1
COLOR CHANGE: 0

## 2025-01-14 NOTE — PROGRESS NOTES
[facility] [place] [facility]  [facility]  [address]  [facility] [facility] [address]  Dept: [phone]  Dept Fax: [phone]  Loc: [phone]    Visit Date: 1/14/2025    Functionality Assessment/Goals Worksheet     On a scale of 0 (Does not Interfere) to 10 (Completely Interferes)     1.  Which number describes how during the past week pain has interfered with       the following:  A.  General Activity:  9  B.  Mood: 6  C.  Walking Ability:  9  D.  Normal Work (Includes both work outside the home and housework):  9  E.  Relations with Other People:   5  F.  Sleep:   4  G.  Enjoyment of Life:   8    2.  Patient Prefers to Take their Pain Medications:     []  On a regular basis   [x]  Only when necessary    []  Does not take pain medications    3.  What are the Patient's Goals/Expectations for Visiting Pain Management?     []  Learn about my pain    [x]  Receive Medication   []  Physical Therapy     []  Treat Depression   []  Receive Injections    []  Treat Sleep   []  Deal with Anxiety and Stress   []  Treat Opoid Dependence/Addiction   []  Other:      HPI:   Jesi Rivera is a 74 y.o. female is here today for    Chief Complaint: Low back pain and SI pain      F/U   Bilateral Sacroiliac Diagnostic injection  under Fluoroscopy 12/17/2024 states she received about 85% pain relief or benefit for 3 days then slowly wore off by end of the week, she still feels better now than she did before the injection.  She has continued Bilateral SI pain increases with walking standing bending lifting.         She had Lumbar Laminectomy and Fusion L4-5 9/6/2022  with Dr Frances since her last visit.  She states recovery was tough.  The surgery was helping her pain  for awhile. Low lumbar  Bilateral SI pain is coming back more moderate to severe. Not as bad as it was prior to lumbar surgery but getting close at times.  She has constant low back SI pain except at

## 2025-01-14 NOTE — PROGRESS NOTES
Functionality Assessment/Goals Worksheet     On a scale of 0 (Does not Interfere) to 10 (Completely Interferes)     1.  Which number describes how during the past week pain has interfered with           the following:  A.  General Activity:  5  B.  Mood: 6  C.  Walking Ability:  5  D.  Normal Work (Includes both work outside the home and housework):  6  E.  Relations with Other People:   7  F.  Sleep:   7  G.  Enjoyment of Life:   7    2.  Patient Prefers to Take their Pain Medications:     []  On a regular basis   []  Only when necessary    []  Does not take pain medications    3.  What are the Patient's Goals/Expectations for Visiting Pain Management?     [x]  Learn about my pain    []  Receive Medication   []  Physical Therapy     []  Treat Depression   [x]  Receive Injections    []  Treat Sleep   []  Deal with Anxiety and Stress   []  Treat Opoid Dependence/Addiction   []  Other:

## 2025-02-20 ENCOUNTER — HOSPITAL ENCOUNTER (OUTPATIENT)
Age: 75
Discharge: HOME | End: 2025-02-20
Payer: MEDICARE

## 2025-02-20 ENCOUNTER — TELEPHONE (OUTPATIENT)
Dept: FAMILY MEDICINE CLINIC | Age: 75
End: 2025-02-20

## 2025-02-20 LAB
ANION GAP SERPL CALC-SCNC: 8 MEQ/L (ref 8–16)
BUN SERPL-MCNC: 24 MG/DL (ref 7–22)
CALCIUM SERPL-MCNC: 9.4 MG/DL (ref 8.2–9.6)
CHLORIDE SERPL-SCNC: 104 MEQ/L (ref 98–111)
CO2 SERPL-SCNC: 27 MEQ/L (ref 23–33)
CREAT SERPL-MCNC: 0.6 MG/DL (ref 0.4–1.2)
DEPRECATED RDW RBC AUTO: 47.2 FL (ref 35–45)
EKG ATRIAL RATE: 56 BPM
EKG P AXIS: -20 DEGREES
EKG P-R INTERVAL: 150 MS
EKG Q-T INTERVAL: 438 MS
EKG QRS DURATION: 92 MS
EKG QTC CALCULATION (BAZETT): 422 MS
EKG R AXIS: -39 DEGREES
EKG T AXIS: 50 DEGREES
EKG VENTRICULAR RATE: 56 BPM
ERYTHROCYTE [DISTWIDTH] IN BLOOD BY AUTOMATED COUNT: 13.2 % (ref 11.5–14.5)
GFR SERPL CREATININE-BSD FRML MDRD: > 90 ML/MIN/1.73M2
GLUCOSE SERPL-MCNC: 97 MG/DL (ref 70–108)
HCT VFR BLD AUTO: 39.2 % (ref 37–47)
HGB BLD-MCNC: 12.9 GM/DL (ref 12–16)
MCH RBC QN AUTO: 31.8 PG (ref 26–33)
MCHC RBC AUTO-ENTMCNC: 32.9 GM/DL (ref 32.2–35.5)
MCV RBC AUTO: 96.6 FL (ref 81–99)
PLATELET # BLD AUTO: 205 THOU/MM3 (ref 130–400)
PMV BLD AUTO: 10.3 FL (ref 9.4–12.4)
POTASSIUM SERPL-SCNC: 4.2 MEQ/L (ref 3.5–5.2)
RBC # BLD AUTO: 4.06 MILL/MM3 (ref 4.2–5.4)
SODIUM SERPL-SCNC: 139 MEQ/L (ref 135–145)
WBC # BLD AUTO: 6.7 THOU/MM3 (ref 4.8–10.8)

## 2025-02-20 PROCEDURE — 85027 COMPLETE CBC AUTOMATED: CPT

## 2025-02-20 PROCEDURE — 36415 COLL VENOUS BLD VENIPUNCTURE: CPT

## 2025-02-20 PROCEDURE — 93005 ELECTROCARDIOGRAM TRACING: CPT | Performed by: PODIATRIST

## 2025-02-20 PROCEDURE — 80048 BASIC METABOLIC PNL TOTAL CA: CPT

## 2025-02-20 NOTE — TELEPHONE ENCOUNTER
Jesi stopped in and requested Pre-Op for upcoming Surgery.    Surgery: Left Foot Surgery  Surgeon: Dr. Khoury  Date: 03/06/25  Where is Surgery taking place: [facility]  Who called with Extension if applicable:   Is the Surgeons office ordering Pre-Op Testing: Yes EKG & Blood Work    Jesi may be reached at [phone]    Blank form scanned into Epic and placed on cart.

## 2025-02-24 NOTE — DISCHARGE INSTRUCTIONS
Post procedure Instructions:    No driving or making significant decisions for the remainder of the day.   Be cautions with walking and activity for 24 hours, do not over exert yourself.  Ok to resume pre-procedure activity level today.  Apply ice to site of injection site if you have pain or discomfort.  Do not submerge sit of injection during bath or pool activities for 48 hours post-procedure.  Resume blood thinning medications in 24 hours.     Call office [phone] if you have:  Temperature greater than 100.4  Persistent nausea and vomiting  Severe uncontrolled pain  Redness, tenderness, or signs of infection (pain, swelling, redness, odor or green/yellow discharge around the site)  Difficulty breathing, headache or visual disturbances  Hives  Persistent dizziness or light-headedness  Extreme fatigue  Any other questions or concerns you may have after discharge    In an emergency, call 911 or go to an Emergency Department at a nearby hospital    “Surgical Site Infections”      How can we work together to prevent Surgical Site Infections?   We would like to thank you for choosing [facility] for your Surgical Care.  Below you will find helpful information on how we can work together to prevent Surgical Site Infections.    What is a Surgical Site Infection (SSI)?  A surgical site infection is an infection that occurs after surgery in the part of the body where the surgery took place. Most patients who have surgery do not develop an infection. However, infections develop in about 1 to 3 out of every 100 patients who have surgery.  Some of the common symptoms of a surgical site infection are:  Redness and pain around the area where you had surgery  Drainage of cloudy fluid from your surgical wound  Fever    Can SSIs be treated?  Yes. Most surgical site infections can be treated with antibiotics. The antibiotic given to you depends on the bacteria (germs) causing the infection. Sometimes patients with

## 2025-02-24 NOTE — TELEPHONE ENCOUNTER
Pt called asking if she is needing to be seen for pre-op. She said that she had the blood work and EKG done. She read the results on [facility] and is worried about the EKG. She would like to know if it is ok.

## 2025-02-25 RX ORDER — METOPROLOL SUCCINATE 25 MG/1
25 TABLET, EXTENDED RELEASE ORAL DAILY
COMMUNITY

## 2025-02-25 NOTE — PROGRESS NOTES
NPO after midnight (may have 8 oz of water up to 2 hours before surgery)  Bring insurance info and drivers license  Wear comfortable clean clothing  Do not bring jewelry  Shower night before and morning of surgery with a liquid antibacterial soap  Bring list of medications with dosage and how often taken  Follow all instructions given by your physician   needed at discharge  You must have a responsible adult with you day of surgery and for 24 hours after surgery  Call [phone] for any questions

## 2025-02-25 NOTE — PROGRESS NOTES
In preparation for their surgical procedure above patient was screened for Obstructive Sleep Apnea (PEDRO) using the STOP-Bang Questionnaire by the Pre-Admission Testing department.  This is a pre-surgical screening tool for patient safety and serves as a recommendation, this WILL NOT cause cancellation of surgery.    STOP-Bang Questionnaire  * Do you currently see a pulmonologist?  No     If yes STOP, do not complete.  Patient follows with DrMane     1.  Do you snore loudly (able to be heard in the next room)?      No    2.  Do you often feel tired or sleepy during the daytime?          No       3.  Has anyone ever told you that you stop breathing during your sleep?       No    4.  Do you have or are you being treated for high blood pressure?          Yes      5.  BMI more than 35?  BMI (Calculated): 30.2        No    6.  Age over 50 years? 74 y.o.      Yes    7.  Neck Circumference greater than 17 inches for male or 16 inches for female?  Measured           (visits only)            Not Applicable    8.  Gender Male?                 No      TOTAL SCORE: 2    PEDRO - Low Risk : Yes to 0 - 2 questions  PEDRO - Intermediate Risk : Yes to 3 - 4 questions  PEDRO - High Risk : Yes to 5 - 8 questions    Adapted from:   STOP Questionnaire: A Tool to Screen Patients for Obstructive Sleep Apnea   FRANKY Spicer.R.C.P.C., Martinez Tierney M.B.B.S., Lavern Martinez M.D., Shani Sloan, Ph.D., PATRICA Murrell.B.B.S., PATRICA Ndiaye.Sc., Margarito Calvo M.D., Nate Roblero F.R.C.P.C.   Anesthesiology 2008; 108:812-21 Copyright 2008, the American Society of Anesthesiologists, Inc. [employer]   ----------------------------------------------------------------------------------------------------------------

## 2025-02-26 ASSESSMENT — ENCOUNTER SYMPTOMS
COLOR CHANGE: 0
BACK PAIN: 1

## 2025-02-26 NOTE — H&P
Today, patient presents for planned Bilateral Sacroiliac ADVANCED injection    This note is reflective of the patient's previous visit for evaluation. We will proceed with today's planned procedure. Since patient's last visit for evaluation, there have been no interval changes in medical history. Patient has no new numbness, weakness, or focal neurological deficit since evaluation. Patient has no contraindications to injection (no anticoagulation or recent antibiotic intake for active infections), and has a  present or is able to drive themselves (as discussed and cleared by physician).  Allergies to latex, contrast dye, and steroid medications have been confirmed with the patient prior to the procedure.  NPO necessity has been assessed and accepted based on procedure complexity. The risks and benefits of the procedure have been explained including but are not limited to infection, bleeding, paralysis, immediate post procedure weakness, and dizziness; the patient acknowledges understanding and desires to proceed with the procedure. Patient has signed consent for same procedure as discussed in previous clinic encounter. All other questions and concerns were addressed at bedside. See procedure note for full details.       Post procedure Instructions: The patient was advised not to drive during the day of the procedure and not to engage in any significant decision making (unless otherwise states by physician). The patient was also advised to be cautious with walking/activity for 24 hours following today's visit and asked not to engage in over-exertion (unless otherwise states by physician). After this time, it is ok to resume pre-procedure level of activity. Patient advised to apply ice to site of injection in situations of pain and discomfort. Patient advised to not submerge site of injection during bath or pool activities for approximately 24 hours post-procedure. Patient attested to understanding post

## 2025-02-27 ENCOUNTER — HOSPITAL ENCOUNTER (OUTPATIENT)
Age: 75
Setting detail: OUTPATIENT SURGERY
Discharge: HOME | End: 2025-02-27
Attending: ANESTHESIOLOGY | Admitting: ANESTHESIOLOGY
Payer: MEDICARE

## 2025-02-27 ENCOUNTER — APPOINTMENT (OUTPATIENT)
Dept: GENERAL RADIOLOGY | Age: 75
End: 2025-02-27
Attending: ANESTHESIOLOGY
Payer: MEDICARE

## 2025-02-27 VITALS
SYSTOLIC BLOOD PRESSURE: 100 MMHG | HEIGHT: 62 IN | WEIGHT: 165 LBS | HEART RATE: 72 BPM | OXYGEN SATURATION: 93 % | TEMPERATURE: 97.5 F | BODY MASS INDEX: 30.36 KG/M2 | RESPIRATION RATE: 14 BRPM | DIASTOLIC BLOOD PRESSURE: 64 MMHG

## 2025-02-27 PROCEDURE — 7100000011 HC PHASE II RECOVERY - ADDTL 15 MIN: Performed by: ANESTHESIOLOGY

## 2025-02-27 PROCEDURE — 6360000004 HC RX CONTRAST MEDICATION: Performed by: ANESTHESIOLOGY

## 2025-02-27 PROCEDURE — 3600000054 HC PAIN LEVEL 3 BASE: Performed by: ANESTHESIOLOGY

## 2025-02-27 PROCEDURE — 6360000002 HC RX W HCPCS: Performed by: ANESTHESIOLOGY

## 2025-02-27 PROCEDURE — 99152 MOD SED SAME PHYS/QHP 5/>YRS: CPT | Performed by: ANESTHESIOLOGY

## 2025-02-27 PROCEDURE — 7100000010 HC PHASE II RECOVERY - FIRST 15 MIN: Performed by: ANESTHESIOLOGY

## 2025-02-27 PROCEDURE — 64451 NJX AA&/STRD NRV NRVTG SI JT: CPT | Performed by: ANESTHESIOLOGY

## 2025-02-27 PROCEDURE — 2709999900 HC NON-CHARGEABLE SUPPLY: Performed by: ANESTHESIOLOGY

## 2025-02-27 RX ORDER — LIDOCAINE HYDROCHLORIDE 10 MG/ML
INJECTION, SOLUTION EPIDURAL; INFILTRATION; INTRACAUDAL; PERINEURAL PRN
Status: DISCONTINUED | OUTPATIENT
Start: 2025-02-27 | End: 2025-02-27 | Stop reason: ALTCHOICE

## 2025-02-27 RX ORDER — MIDAZOLAM HYDROCHLORIDE 1 MG/ML
INJECTION, SOLUTION INTRAMUSCULAR; INTRAVENOUS PRN
Status: DISCONTINUED | OUTPATIENT
Start: 2025-02-27 | End: 2025-02-27 | Stop reason: ALTCHOICE

## 2025-02-27 RX ORDER — BUPIVACAINE HYDROCHLORIDE 5 MG/ML
INJECTION, SOLUTION PERINEURAL PRN
Status: DISCONTINUED | OUTPATIENT
Start: 2025-02-27 | End: 2025-02-27 | Stop reason: ALTCHOICE

## 2025-02-27 RX ORDER — METHYLPREDNISOLONE ACETATE 80 MG/ML
INJECTION, SUSPENSION INTRA-ARTICULAR; INTRALESIONAL; INTRAMUSCULAR; SOFT TISSUE PRN
Status: DISCONTINUED | OUTPATIENT
Start: 2025-02-27 | End: 2025-02-27 | Stop reason: ALTCHOICE

## 2025-02-27 RX ORDER — IOPAMIDOL 612 MG/ML
INJECTION, SOLUTION INTRAVASCULAR PRN
Status: DISCONTINUED | OUTPATIENT
Start: 2025-02-27 | End: 2025-02-27 | Stop reason: ALTCHOICE

## 2025-02-27 RX ORDER — FENTANYL CITRATE 50 UG/ML
INJECTION, SOLUTION INTRAMUSCULAR; INTRAVENOUS PRN
Status: DISCONTINUED | OUTPATIENT
Start: 2025-02-27 | End: 2025-02-27 | Stop reason: ALTCHOICE

## 2025-02-27 ASSESSMENT — PAIN SCALES - GENERAL: PAINLEVEL_OUTOF10: 0

## 2025-02-27 ASSESSMENT — PAIN - FUNCTIONAL ASSESSMENT
PAIN_FUNCTIONAL_ASSESSMENT: NONE - DENIES PAIN
PAIN_FUNCTIONAL_ASSESSMENT: NONE - DENIES PAIN

## 2025-02-27 NOTE — PRE SEDATION
[facility]  Pre-Sedation/Analgesia History & Physical    Pt Name: Jesi Rivera  MRN: [id number]  YOB: 1950  Provider Performing Procedure: Arjun Hernández DO   Primary Care Physician: García Miramontes MD      MEDICAL HISTORY       has a past medical history of Adenomatous colon polyp, Chronic gastritis, Coronary artery spasm, GERD (gastroesophageal reflux disease), Hiatal hernia, Hyperlipidemia, Hypertension, MVP (mitral valve prolapse), Obesity, Class II, BMI 35-39.9, with comorbidity, Osteopenia, Pre-diabetes, Scoliosis (and kyphoscoliosis), idiopathic, and Vitamin D deficiency.  SURGICAL HISTORY   has a past surgical history that includes Carpal tunnel release (Right, 2007); Hand surgery (Left, 2008); Upper gastrointestinal endoscopy (10/2013); Cardiac catheterization (2008); Cardiac catheterization (12/18/2018); Endoscopy, colon, diagnostic (01/27/2019); Nerve Block Lumb Facet Level 1 Bilateral (Bilateral, 02/04/2019); Nerve Block Lumb Facet Level 1 Bilateral (Bilateral, 03/25/2019); Lumbar spine surgery (Right, 05/20/2019); Lumbar spine surgery (Left, 06/17/2019); epidural steroid injection (Bilateral, 08/01/2019); epidural steroid injection (Bilateral, 09/19/2019); Lumbar spine surgery (Right, 12/02/2019); Pain management procedure (Left, 06/15/2020); Pain management procedure (Right, 07/31/2020); Pain management procedure (N/A, 10/29/2020); Pain management procedure (Left, 12/18/2020); Pain management procedure (Right, 02/08/2021); Pain management procedure (Bilateral, 01/27/2022); Back Injection (Bilateral, 05/09/2022); Back Injection (Bilateral, 12/17/2024); and lumbar fusion (09/2022).    ALLERGIES   Allergies as of 01/14/2025 - Fully Reviewed 01/14/2025   Allergen Reaction Noted    Neomycin Swelling 04/30/2021    Pcn [penicillins] Rash 03/30/2012       MEDICATIONS   Prior to Admission medications    Medication Sig Start Date End Date Taking? Authorizing Provider

## 2025-02-27 NOTE — PROGRESS NOTES
0958: Patient to phase 2 recovery room via cart. Patient is awake and alert. Report received from surgical RN, Юлия. Patient's vitals obtained, see charting.   1000: Patient is denying pain and nausea at this time. IV is INT'd.  1002: Offered drink and snack provided to the patient. Bed is in the lowest position and call light is within reach. Blood pressure cuff remains on her arm to recheck it.   1012: Patient's blood pressure has increased- it is now 100/64. Call light remains within reach.   1021: IV removed at this time- no complications and dressing applied. Patient states she understood her discharge instructions given in pre op.   1025: Patient's ride called at this time and went right to voicemail.   1028: Patient ambulated to the car and discharged home in stable condition with her .

## 2025-02-27 NOTE — PROCEDURES
Pre-operative Diagnosis:  SI joint pain     Post-operative Diagnosis:  SI joint pain     Procedure: Bilateral SI joint block     Procedure Description:  After having signed the informed consent, the patient was placed in the prone position.  The patient's low back and buttocks was prepped with chloraprep solution, and draped in a sterile fashion. A total of 1 ml of 1% lidocaine was used to anesthetize the skin and underlying tissues at each level. Next, 3.5 inch 22 g spinal needles were advanced to the anatomic location of the RIGHT L5 primary dorsal ramus at the junction of the superior articular process and sacral ala utilizing intermittent fluoroscopy, as well as the RIGHT S1, S2, and S3 lateral branch nerves at the lateral border of the S1, S2, and S3 posterior/dorsal foramen. Then, a 1 cc mixture of 40 mg depo-medrol and 0.25% bupivacaine was injected at each site. The needles were removed without any complications. This procedure was repeated on the contralateral side. The patient tolerated the procedure well, was transported to the recovery room and observed for 15 minutes and discharged in an ambulatory fashion. No immediate reported complications.     Procedural Complications: None  Estimated Blood Loss: 0 mL           IV sedation was used during the procedure:  - Moderate intravenous conscious sedation was supervised by Dr. Hernández  - The patient was independently monitored by a Registered Nurse assigned to the procedure room  - Monitoring included automated blood pressure, continuous EKG, and continuous pulse oximetry  - The detailed conscious record is permanently stored in the Hospital Information System  - The following is the conscious sedation record:  Start Time: 09:54  End Time : 10:09  Duration: 15 minutes   Medications Administered: 2 mg Versed, 50 mcg Fentanyl        Arjun Hernández DO  Interventional Pain Management/PM&R   [facility]

## 2025-02-27 NOTE — POST SEDATION
[facility]  Sedation/Analgesia Post Sedation Record    Pt Name: Jesi Rivera  MRN: [id number]  YOB: 1950  Procedure Performed By: Arjun Hernández DO  Primary Care Physician: García Miramontes MD    POST-PROCEDURE    Physicians/Assistants: Arjun Hernández DO  Procedure Performed: See Procedure Note   Sedation/Anesthesia: Versed and Fentanyl (See procedure note for amount and duration)  Estimated Blood Loss:     0  ml  Specimens Removed: None        Complications: None           Arjun Hernández DO  Electronically signed 2/27/2025 at 10:54 AM

## 2025-03-03 NOTE — PROGRESS NOTES
Dr. Mclain cleared as low to moderate risk, information given to anesthesia for review. Per Dr. Pinedo ok to proceed at the surgery center 3/6

## 2025-03-06 ENCOUNTER — ANESTHESIA (OUTPATIENT)
Dept: OPERATING ROOM | Age: 75
End: 2025-03-06
Payer: MEDICARE

## 2025-03-06 ENCOUNTER — HOSPITAL ENCOUNTER (OUTPATIENT)
Age: 75
Setting detail: OUTPATIENT SURGERY
Discharge: HOME | End: 2025-03-06
Attending: PODIATRIST | Admitting: PODIATRIST
Payer: MEDICARE

## 2025-03-06 ENCOUNTER — ANESTHESIA EVENT (OUTPATIENT)
Dept: OPERATING ROOM | Age: 75
End: 2025-03-06
Payer: MEDICARE

## 2025-03-06 VITALS
HEART RATE: 61 BPM | WEIGHT: 160 LBS | HEIGHT: 62 IN | BODY MASS INDEX: 29.44 KG/M2 | SYSTOLIC BLOOD PRESSURE: 116 MMHG | OXYGEN SATURATION: 96 % | TEMPERATURE: 96.3 F | RESPIRATION RATE: 13 BRPM | DIASTOLIC BLOOD PRESSURE: 56 MMHG

## 2025-03-06 DIAGNOSIS — G89.18 POST-OP PAIN: Primary | ICD-10-CM

## 2025-03-06 PROCEDURE — 2709999900 HC NON-CHARGEABLE SUPPLY: Performed by: PODIATRIST

## 2025-03-06 PROCEDURE — 6360000002 HC RX W HCPCS

## 2025-03-06 PROCEDURE — 7100000000 HC PACU RECOVERY - FIRST 15 MIN: Performed by: PODIATRIST

## 2025-03-06 PROCEDURE — 2500000003 HC RX 250 WO HCPCS: Performed by: PODIATRIST

## 2025-03-06 PROCEDURE — 6360000002 HC RX W HCPCS: Performed by: PODIATRIST

## 2025-03-06 PROCEDURE — 7100000010 HC PHASE II RECOVERY - FIRST 15 MIN: Performed by: PODIATRIST

## 2025-03-06 PROCEDURE — 2580000003 HC RX 258: Performed by: PODIATRIST

## 2025-03-06 PROCEDURE — 3600000003 HC SURGERY LEVEL 3 BASE: Performed by: PODIATRIST

## 2025-03-06 PROCEDURE — 2500000003 HC RX 250 WO HCPCS

## 2025-03-06 PROCEDURE — 7100000001 HC PACU RECOVERY - ADDTL 15 MIN: Performed by: PODIATRIST

## 2025-03-06 PROCEDURE — 64445 NJX AA&/STRD SCIATIC NRV IMG: CPT | Performed by: STUDENT IN AN ORGANIZED HEALTH CARE EDUCATION/TRAINING PROGRAM

## 2025-03-06 PROCEDURE — 6360000002 HC RX W HCPCS: Performed by: STUDENT IN AN ORGANIZED HEALTH CARE EDUCATION/TRAINING PROGRAM

## 2025-03-06 PROCEDURE — 3600000013 HC SURGERY LEVEL 3 ADDTL 15MIN: Performed by: PODIATRIST

## 2025-03-06 PROCEDURE — 3700000000 HC ANESTHESIA ATTENDED CARE: Performed by: PODIATRIST

## 2025-03-06 PROCEDURE — C1713 ANCHOR/SCREW BN/BN,TIS/BN: HCPCS | Performed by: PODIATRIST

## 2025-03-06 PROCEDURE — 3700000001 HC ADD 15 MINUTES (ANESTHESIA): Performed by: PODIATRIST

## 2025-03-06 PROCEDURE — 7100000011 HC PHASE II RECOVERY - ADDTL 15 MIN: Performed by: PODIATRIST

## 2025-03-06 DEVICE — ANCHOR SUTURE DIA 3.9 MM LOOP 1.7 MM BIOCOMP SWIVELOCK FT CC: Type: IMPLANTABLE DEVICE | Status: FUNCTIONAL

## 2025-03-06 RX ORDER — DEXAMETHASONE SODIUM PHOSPHATE 4 MG/ML
INJECTION, SOLUTION INTRA-ARTICULAR; INTRALESIONAL; INTRAMUSCULAR; INTRAVENOUS; SOFT TISSUE
Status: DISCONTINUED | OUTPATIENT
Start: 2025-03-06 | End: 2025-03-06 | Stop reason: SDUPTHER

## 2025-03-06 RX ORDER — NALOXONE HYDROCHLORIDE 0.4 MG/ML
INJECTION, SOLUTION INTRAMUSCULAR; INTRAVENOUS; SUBCUTANEOUS PRN
Status: DISCONTINUED | OUTPATIENT
Start: 2025-03-06 | End: 2025-03-06 | Stop reason: HOSPADM

## 2025-03-06 RX ORDER — OXYCODONE HYDROCHLORIDE 5 MG/1
10 TABLET ORAL EVERY 4 HOURS PRN
Status: DISCONTINUED | OUTPATIENT
Start: 2025-03-06 | End: 2025-03-06 | Stop reason: HOSPADM

## 2025-03-06 RX ORDER — SODIUM CHLORIDE 0.9 % (FLUSH) 0.9 %
5-40 SYRINGE (ML) INJECTION EVERY 12 HOURS SCHEDULED
Status: DISCONTINUED | OUTPATIENT
Start: 2025-03-06 | End: 2025-03-06 | Stop reason: HOSPADM

## 2025-03-06 RX ORDER — SODIUM CHLORIDE 0.9 % (FLUSH) 0.9 %
5-40 SYRINGE (ML) INJECTION PRN
Status: DISCONTINUED | OUTPATIENT
Start: 2025-03-06 | End: 2025-03-06 | Stop reason: HOSPADM

## 2025-03-06 RX ORDER — RIVAROXABAN 10 MG/1
10 TABLET, FILM COATED ORAL
Qty: 30 TABLET | Refills: 0 | Status: SHIPPED | OUTPATIENT
Start: 2025-03-06 | End: 2025-04-05

## 2025-03-06 RX ORDER — FENTANYL CITRATE 50 UG/ML
50 INJECTION, SOLUTION INTRAMUSCULAR; INTRAVENOUS EVERY 5 MIN PRN
Status: DISCONTINUED | OUTPATIENT
Start: 2025-03-06 | End: 2025-03-06 | Stop reason: HOSPADM

## 2025-03-06 RX ORDER — MIDAZOLAM HYDROCHLORIDE 1 MG/ML
INJECTION, SOLUTION INTRAMUSCULAR; INTRAVENOUS
Status: DISCONTINUED | OUTPATIENT
Start: 2025-03-06 | End: 2025-03-06 | Stop reason: SDUPTHER

## 2025-03-06 RX ORDER — ROPIVACAINE HYDROCHLORIDE 5 MG/ML
INJECTION, SOLUTION EPIDURAL; INFILTRATION; PERINEURAL
Status: COMPLETED | OUTPATIENT
Start: 2025-03-06 | End: 2025-03-06

## 2025-03-06 RX ORDER — FENTANYL CITRATE 50 UG/ML
INJECTION, SOLUTION INTRAMUSCULAR; INTRAVENOUS
Status: DISCONTINUED | OUTPATIENT
Start: 2025-03-06 | End: 2025-03-06 | Stop reason: SDUPTHER

## 2025-03-06 RX ORDER — MORPHINE SULFATE 2 MG/ML
4 INJECTION, SOLUTION INTRAMUSCULAR; INTRAVENOUS
Status: DISCONTINUED | OUTPATIENT
Start: 2025-03-06 | End: 2025-03-06 | Stop reason: HOSPADM

## 2025-03-06 RX ORDER — SODIUM CHLORIDE 9 MG/ML
INJECTION, SOLUTION INTRAVENOUS PRN
Status: DISCONTINUED | OUTPATIENT
Start: 2025-03-06 | End: 2025-03-06 | Stop reason: HOSPADM

## 2025-03-06 RX ORDER — DIPHENHYDRAMINE HYDROCHLORIDE 50 MG/ML
12.5 INJECTION INTRAMUSCULAR; INTRAVENOUS
Status: DISCONTINUED | OUTPATIENT
Start: 2025-03-06 | End: 2025-03-06 | Stop reason: HOSPADM

## 2025-03-06 RX ORDER — ONDANSETRON 2 MG/ML
INJECTION INTRAMUSCULAR; INTRAVENOUS
Status: DISCONTINUED | OUTPATIENT
Start: 2025-03-06 | End: 2025-03-06 | Stop reason: SDUPTHER

## 2025-03-06 RX ORDER — ONDANSETRON 2 MG/ML
4 INJECTION INTRAMUSCULAR; INTRAVENOUS
Status: DISCONTINUED | OUTPATIENT
Start: 2025-03-06 | End: 2025-03-06 | Stop reason: HOSPADM

## 2025-03-06 RX ORDER — PROPOFOL 10 MG/ML
INJECTION, EMULSION INTRAVENOUS
Status: DISCONTINUED | OUTPATIENT
Start: 2025-03-06 | End: 2025-03-06 | Stop reason: SDUPTHER

## 2025-03-06 RX ORDER — ONDANSETRON 4 MG/1
4 TABLET, ORALLY DISINTEGRATING ORAL EVERY 8 HOURS PRN
Status: DISCONTINUED | OUTPATIENT
Start: 2025-03-06 | End: 2025-03-06 | Stop reason: HOSPADM

## 2025-03-06 RX ORDER — OXYCODONE HYDROCHLORIDE 5 MG/1
5 TABLET ORAL EVERY 4 HOURS PRN
Status: DISCONTINUED | OUTPATIENT
Start: 2025-03-06 | End: 2025-03-06 | Stop reason: HOSPADM

## 2025-03-06 RX ORDER — LIDOCAINE HYDROCHLORIDE 20 MG/ML
INJECTION, SOLUTION INTRAVENOUS
Status: DISCONTINUED | OUTPATIENT
Start: 2025-03-06 | End: 2025-03-06 | Stop reason: SDUPTHER

## 2025-03-06 RX ORDER — SODIUM CHLORIDE 9 MG/ML
INJECTION, SOLUTION INTRAVENOUS CONTINUOUS
Status: DISCONTINUED | OUTPATIENT
Start: 2025-03-06 | End: 2025-03-06 | Stop reason: HOSPADM

## 2025-03-06 RX ORDER — OXYCODONE AND ACETAMINOPHEN 5; 325 MG/1; MG/1
1 TABLET ORAL EVERY 6 HOURS PRN
Qty: 28 TABLET | Refills: 0 | Status: SHIPPED | OUTPATIENT
Start: 2025-03-06 | End: 2025-03-13

## 2025-03-06 RX ORDER — ROCURONIUM BROMIDE 10 MG/ML
INJECTION, SOLUTION INTRAVENOUS
Status: DISCONTINUED | OUTPATIENT
Start: 2025-03-06 | End: 2025-03-06 | Stop reason: SDUPTHER

## 2025-03-06 RX ORDER — KETOROLAC TROMETHAMINE 10 MG/1
10 TABLET, FILM COATED ORAL 4 TIMES DAILY
Qty: 20 TABLET | Refills: 0 | Status: SHIPPED | OUTPATIENT
Start: 2025-03-06 | End: 2025-03-11

## 2025-03-06 RX ORDER — PHENYLEPHRINE HCL IN 0.9% NACL 1 MG/10 ML
SYRINGE (ML) INTRAVENOUS
Status: DISCONTINUED | OUTPATIENT
Start: 2025-03-06 | End: 2025-03-06 | Stop reason: SDUPTHER

## 2025-03-06 RX ORDER — ONDANSETRON 2 MG/ML
4 INJECTION INTRAMUSCULAR; INTRAVENOUS EVERY 6 HOURS PRN
Status: DISCONTINUED | OUTPATIENT
Start: 2025-03-06 | End: 2025-03-06 | Stop reason: HOSPADM

## 2025-03-06 RX ORDER — MORPHINE SULFATE 2 MG/ML
2 INJECTION, SOLUTION INTRAMUSCULAR; INTRAVENOUS
Status: DISCONTINUED | OUTPATIENT
Start: 2025-03-06 | End: 2025-03-06 | Stop reason: HOSPADM

## 2025-03-06 RX ADMIN — PROPOFOL 150 MG: 10 INJECTION, EMULSION INTRAVENOUS at 07:45

## 2025-03-06 RX ADMIN — SODIUM CHLORIDE: 9 INJECTION, SOLUTION INTRAVENOUS at 07:41

## 2025-03-06 RX ADMIN — Medication 50 MCG: at 09:13

## 2025-03-06 RX ADMIN — LIDOCAINE HYDROCHLORIDE 50 MG: 20 INJECTION, SOLUTION INTRAVENOUS at 07:45

## 2025-03-06 RX ADMIN — ONDANSETRON 4 MG: 2 INJECTION, SOLUTION INTRAMUSCULAR; INTRAVENOUS at 08:07

## 2025-03-06 RX ADMIN — FENTANYL CITRATE 50 MCG: 50 INJECTION, SOLUTION INTRAMUSCULAR; INTRAVENOUS at 07:45

## 2025-03-06 RX ADMIN — Medication 50 MCG: at 07:47

## 2025-03-06 RX ADMIN — ROPIVACAINE HYDROCHLORIDE 20 ML: 5 INJECTION, SOLUTION EPIDURAL; INFILTRATION; PERINEURAL at 07:30

## 2025-03-06 RX ADMIN — PROPOFOL 50 MG: 10 INJECTION, EMULSION INTRAVENOUS at 07:51

## 2025-03-06 RX ADMIN — ROCURONIUM BROMIDE 50 MG: 10 INJECTION, SOLUTION INTRAVENOUS at 07:45

## 2025-03-06 RX ADMIN — DEXAMETHASONE SODIUM PHOSPHATE 10 MG: 4 INJECTION, SOLUTION INTRAMUSCULAR; INTRAVENOUS at 08:07

## 2025-03-06 RX ADMIN — FENTANYL CITRATE 50 MCG: 50 INJECTION, SOLUTION INTRAMUSCULAR; INTRAVENOUS at 07:50

## 2025-03-06 RX ADMIN — SUGAMMADEX 200 MG: 100 INJECTION, SOLUTION INTRAVENOUS at 09:39

## 2025-03-06 RX ADMIN — MIDAZOLAM 2 MG: 1 INJECTION INTRAMUSCULAR; INTRAVENOUS at 07:30

## 2025-03-06 RX ADMIN — PROPOFOL 50 MG: 10 INJECTION, EMULSION INTRAVENOUS at 08:58

## 2025-03-06 RX ADMIN — WATER 2000 MG: 1 INJECTION INTRAMUSCULAR; INTRAVENOUS; SUBCUTANEOUS at 07:54

## 2025-03-06 RX ADMIN — ROCURONIUM BROMIDE 30 MG: 10 INJECTION, SOLUTION INTRAVENOUS at 08:44

## 2025-03-06 RX ADMIN — PROPOFOL 50 MG: 10 INJECTION, EMULSION INTRAVENOUS at 08:08

## 2025-03-06 ASSESSMENT — PAIN - FUNCTIONAL ASSESSMENT
PAIN_FUNCTIONAL_ASSESSMENT: 0-10
PAIN_FUNCTIONAL_ASSESSMENT: NONE - DENIES PAIN

## 2025-03-06 NOTE — ANESTHESIA PRE PROCEDURE
Department of Anesthesiology  Preprocedure Note       Name:  Jesi Rivera   Age:  74 y.o.  :  1950                                          MRN:  [id number]         Date:  3/6/2025      Surgeon: Surgeon(s):  Gatito Khoury DPM    Procedure: Procedure(s):  LEFT HEEL OSTECTOMY, ACHILLES TENDON REPAIR    Medications prior to admission:   Prior to Admission medications    Medication Sig Start Date End Date Taking? Authorizing Provider   metoprolol succinate (TOPROL XL) 25 MG extended release tablet Take 1 tablet by mouth daily   Yes Nathaniel Bustamante MD   SEMAGLUTIDE-WEIGHT MANAGEMENT SC Inject into the skin once a week On Fri   Yes Nathaniel Bustamante MD   amLODIPine (NORVASC) 2.5 MG tablet Take 1 tablet by mouth daily 12/10/24 12/10/25 Yes García Miramontes MD   lisinopril (PRINIVIL;ZESTRIL) 20 MG tablet TAKE 1 TABLET DAILY 24  Yes García Miramontes MD   raloxifene (EVISTA) 60 MG tablet Take 1 tablet by mouth daily 24  Yes García Miramontes MD   Cholecalciferol (VITAMIN D3) 75 MCG (3000 UT) TABS Take 1 tablet by mouth daily 24  Yes García Miramontes MD   famotidine (PEPCID) 20 MG tablet Take 2 tablets by mouth daily 1 qd 23  Yes García Miramontes MD   vitamin C (ASCORBIC ACID) 500 MG tablet Take 1 tablet by mouth 2 times daily   Yes Nathaniel Bustamante MD   ferrous sulfate (IRON 325) 325 (65 Fe) MG tablet Take 1 tablet by mouth daily (with breakfast)   Yes Nathaniel Bustamante MD   simvastatin (ZOCOR) 20 MG tablet TAKE 1 TABLET BY MOUTH EVERY DAY 20  Yes García Miramontes MD   calcium carbonate 600 MG TABS tablet Take 1 tablet by mouth 2 times daily   Yes Nathaniel Bustamante MD   sertraline (ZOLOFT) 50 MG tablet TAKE 1 TABLET BY MOUTH EVERYDAY AT BEDTIME 24   García Miramontes MD   aspirin 81 MG tablet Take 1 tablet by mouth daily    Nathaniel Bustamante MD       Current medications:    Current Facility-Administered Medications   Medication Dose Route

## 2025-03-06 NOTE — PROGRESS NOTES
Pt. Admitted in stable condition. Consent signed. VS obtained and WNL. INT inserted without complications.  Pt. Denies all other needs. Warm blanket provided. Call light left within reach. Spouse brought to bedside.

## 2025-03-06 NOTE — OP NOTE
fragmentations or prominences to the posterior heel.    At this time 4 drill holes were made utilizing the Ar[employer]rex speed bridge set. These holes were oriented 2 distal and 2 proximal within the posterior surface of the calcaneus. Next, 2 Arthrex speedbridge anchors were inserted within the proximal holes, and the Fiberwire suture was passed medially and laterally through the Achilles tendon. The fiberwire was then passed through the 2nd set of Speedbridge anchors and these were inserted at proper tension and length into the 2 distal drill holes within the calcaneus, forming a crossed pattern of 4 anchors. Finally, the remaining 4 suture ends were passed medially and then laterally through the Achilles tendon and hand-tied down to an appropriate tension. Good physiological tension on the Achilles was verified and good placement of the Anchors was verified on C-arm.     The incision was flushed with copious amounts of sterile saline.  The subcutaneous tissues were re-appoximated with 3-0 Vicryl.  The skin was closed using 4-0 Monocryl.  A post-op injection of 6 cc's of platelet rich plasma was injected.  The incision was dressed with adaptic, 4x4's, kerlix, etc.  The pneumatic  thigh tourniquet was then deflated and an immediate hyperemic response was noted to all digits of the left foot.  A cam walker boot was then applied.  The patient was transported to the PACU with VSS and NVS intact to all digits of the left foot.  No complications were noted throughout the procedure.  The patient is to be discharged per PACU protocol.  The patient is to follow-up with Dr. Khoury in the office.    STAN Rosales DPM, PGY 2  Foot and Ankle Surgical Resident  3/6/2025  9:46 AM    Electronically signed by Scooter Cota DPM on 3/6/2025 at 9:46 AM

## 2025-03-06 NOTE — H&P
[facility]  History and Physical Update    Pt Name: Jesi Rivera  MRN: [id number]  YOB: 1950  Date of evaluation: 3/6/2025    I have examined the patient and reviewed the H&P/Consult and there are no changes to the patient or plans.      Gatito Khoury DPM,FACFAS  Electronically signed 3/6/2025 at 7:33 AM

## 2025-03-06 NOTE — FLOWSHEET NOTE
0725 RN at bedside with Dr. Magaña for a popliteal block.    Consent signed and Time out completed.  0729 Procedure start  0732 Procedure complete

## 2025-03-06 NOTE — PROGRESS NOTES
0940 To recovery via cart. Spont resp. VSS. Report received from surgical rn and CRNA. IV infusing KVO. Surgical boot in place to lower left extremity and elevated on pillow. Ace wrap and stockinet clean and intact to lower left extremity. Pt denies pain. Restful.  0945 To right side per self. O2 on at 4L per N/C  0950 Restful and stable  0955 Condition remains stable and unchanged  1000 O2 decreased to 2 L per N/C  1005 Stable  1010 Meets criteria for  transfer to phase II recovery care. Transfer via cart.  1015 In Phase II recovery. HOB elevated. Snack and drink given. Call bell in reach.  in room  1045 States still sleepy. Warm blanket applied  1100 Discharge instructions given to pt and  with each voicing understanding. IV discontinued. Up to dress with  assist  1120 Discharge to home in stable condition per wheelchair with

## 2025-03-06 NOTE — BRIEF OP NOTE
Brief Postoperative Note      Patient: Jesi Rivera  YOB: 1950  MRN: [id number]    Date of Procedure: 3/6/2025    Pre-Op Diagnosis Codes:      * Pain of left heel [M79.672]     * Calcification of tendon [M65.80]    Post-Op Diagnosis: Same       Procedure(s):  LEFT HEEL OSTECTOMY, ACHILLES TENDON REPAIR WITH PLATELET RICH PLASMA INJECTION LEFT    Surgeon(s):  Gatito Khoury DPM    Assistant:  Resident: Rodolfo Johnson DPM; Scooter Cota DPM    Anesthesia: General    Estimated Blood Loss (mL): Minimal    Hemostasis: Left thigh tourniquet at 300 mmHg    Injectables: 60 cc of platelet rich plasma    Materials: 3-0 Vicryl, 4-0 Monocryl    Complications: None    Specimens:   * No specimens in log *    Implants:  Implant Name Type Inv. Item Serial No.  Lot No. LRB No. Used Action   ANCHOR SUTURE ERICA 3.9 MM LOOP 1.7 MM BIOCOMP SWIVELOCK FT CC - [id number]  ANCHOR SUTURE ERICA 3.9 MM LOOP 1.7 MM BIOCOMP SWIVELOCK FT CC  [employer]- [id number] Left 1 Implanted         Drains:   NG/OG/NJ/NE Tube Orogastric 18 fr Center mouth (Active)       Findings:  Infection Present At Time Of Surgery (PATOS) (choose all levels that have infection present):  No infection present  Other Findings: None    Electronically signed by Scooter Cota DPM on 3/6/2025 at 9:43 AM

## 2025-03-06 NOTE — DISCHARGE INSTRUCTIONS
Post-Operative Instructions    Diet: Drink liquids first; if tolerated add soft foods and increase diet as tolerated  Activity: Adults do NOT drive today. Due to anesthesia and medication your reflexes are slower. If you are wearing a surgical shoe or walking boot you may not drive until Dr. Khoury releases you to do so.  Ambulate: Nonweightbearing to left operative extremity.  Leave the bandage in place. Do not remove it. Keep it dry and clean.  Rest by staying off your feet as much as possible. (Rent movies, read a book, etc.)  Wear surgical shoe/boot at all times when you are up on your foot/feet.  Elevate the involved foot. Proper elevation is when the involved foot/feet are at the same level as the heart or slightly higher.  If the foot has some throbbing you may apply ice to the top of the ankle area or behind the knee, 20 min on 20 min off.  Take medications as prescribed   If there should be any excess bleeding to the bandage (wet blood larger than the size of a quarter), uncontrolled pain, questions or concerns please contact:   Office between 8:00 a.m. - 4:00P.M.    [phone]              Please return to office for a post-op dressing change and elevation of healing. Post-operative appointment is at previously scheduled date.     Dr. Khoury, STAN Cota DPM, PGY 2  Podiatric Surgical Resident  03/06/25  9:40 AM

## 2025-03-06 NOTE — ANESTHESIA POSTPROCEDURE EVALUATION
Department of Anesthesiology  Postprocedure Note    Patient: Jesi Rivera  MRN: [id number]  YOB: 1950  Date of evaluation: 3/6/2025    Procedure Summary       Date: 03/06/25 Room / Location: [address]    Anesthesia Start: 0739 Anesthesia Stop: 0943    Procedure: LEFT HEEL OSTECTOMY, ACHILLES TENDON REPAIR WITH PLATELET RICH PLASMA INJECTION LEFT (Left: Foot) Diagnosis:       Pain of left heel      Calcification of tendon      (Pain of left heel [M79.672])      (Calcification of tendon [M65.80])    Surgeons: Gatito Khoury DPM Responsible Provider: Cesar Magaña DO    Anesthesia Type: general, regional ASA Status: 3            Anesthesia Type: No value filed.    Eloy Phase I: Eloy Score: 9    Eloy Phase II: Eloy Score: 10    Anesthesia Post Evaluation    Patient location during evaluation: PACU  Patient participation: complete - patient participated  Level of consciousness: awake and alert  Airway patency: patent  Nausea & Vomiting: no vomiting and no nausea  Cardiovascular status: hemodynamically stable  Respiratory status: acceptable  Hydration status: stable  Pain management: adequate    No notable events documented.

## 2025-03-06 NOTE — ANESTHESIA PROCEDURE NOTES
Peripheral Block    Patient location during procedure: pre-op  Reason for block: post-op pain management and at surgeon's request  Start time: 3/6/2025 7:30 AM  End time: 3/6/2025 7:33 AM  Staffing  Performed: anesthesiologist   Anesthesiologist: Cesar Magaña DO  Performed by: Cesar Magaña DO  Authorized by: Cesar Magaña DO    Preanesthetic Checklist  Completed: patient identified, IV checked, site marked, risks and benefits discussed, surgical/procedural consents, equipment checked, pre-op evaluation, timeout performed, anesthesia consent given, oxygen available, monitors applied/VS acknowledged, fire risk safety assessment completed and verbalized and blood product R/B/A discussed and consented  Peripheral Block   Patient position: right lateral decubitus  Prep: ChloraPrep  Provider prep: mask and sterile gloves  Patient monitoring: cardiac monitor, continuous pulse ox, IV access and responsive to questions  Block type: Sciatic  Popliteal  Laterality: left  Injection technique: single-shot  Guidance: ultrasound guided    Needle   Needle type: insulated echogenic nerve stimulator needle   Needle gauge: 20 G  Needle localization: ultrasound guidance  Needle length: 10 cm  Assessment   Injection assessment: negative aspiration for heme, no paresthesia on injection, local visualized surrounding nerve on ultrasound and no intravascular symptoms  Paresthesia pain: none  Slow fractionated injection: yes  Hemodynamics: stable  Outcomes: uncomplicated and patient tolerated procedure well    Medications Administered  ropivacaine (NAROPIN) injection 0.5% - Perineural   20 mL - 3/6/2025 7:30:00 AM

## 2025-03-12 ENCOUNTER — TELEPHONE (OUTPATIENT)
Dept: FAMILY MEDICINE CLINIC | Age: 75
End: 2025-03-12

## 2025-03-12 NOTE — TELEPHONE ENCOUNTER
Pt called stating that she had foot surgery and  put her on Eliquis and told her to stop the zoloft.She is asking on how to go about stopping that medication.    Please call pt once addressed [phone]   Magdiel Larson(Attending)

## 2025-03-12 NOTE — TELEPHONE ENCOUNTER
Pt stated that Dr. Khouyr just put pt on Eliquis on 3-6-2025. She is taking 5 mg because Dr. Khoury was worried about blood clots. She stated that Dr. Khoury was going to take her off the Eliquis. Dr. Khoury stated that he wanted pt off the zoloft because of potential drug interactions between the Eliquis and Zoloft.

## 2025-03-24 RX ORDER — METOPROLOL SUCCINATE 25 MG/1
25 TABLET, EXTENDED RELEASE ORAL DAILY
Qty: 90 TABLET | Refills: 3 | Status: SHIPPED | OUTPATIENT
Start: 2025-03-24

## 2025-03-24 NOTE — TELEPHONE ENCOUNTER
Pt called requesting 90 day Rx for Metoprolol 25 mg one tablet daily.    STEVIE Calderon    Pt has appt here on 4/28/25

## 2025-03-24 NOTE — TELEPHONE ENCOUNTER
Date of last visit:  10/3/2024  Date of next visit:  4/28/2025    Requested Prescriptions     Pending Prescriptions Disp Refills    metoprolol succinate (TOPROL XL) 25 MG extended release tablet 90 tablet 3     Sig: Take 1 tablet by mouth daily

## 2025-04-09 ENCOUNTER — OFFICE VISIT (OUTPATIENT)
Dept: PHYSICAL MEDICINE AND REHAB | Age: 75
End: 2025-04-09
Payer: MEDICARE

## 2025-04-09 VITALS
BODY MASS INDEX: 29.44 KG/M2 | SYSTOLIC BLOOD PRESSURE: 120 MMHG | HEIGHT: 62 IN | WEIGHT: 160 LBS | DIASTOLIC BLOOD PRESSURE: 70 MMHG

## 2025-04-09 DIAGNOSIS — M53.3 SACROILIAC JOINT DYSFUNCTION: ICD-10-CM

## 2025-04-09 DIAGNOSIS — M54.17 LUMBOSACRAL RADICULITIS: ICD-10-CM

## 2025-04-09 DIAGNOSIS — M46.1 SI (SACROILIAC) JOINT INFLAMMATION: Primary | ICD-10-CM

## 2025-04-09 DIAGNOSIS — Z98.1 HISTORY OF LUMBAR FUSION: ICD-10-CM

## 2025-04-09 DIAGNOSIS — M47.816 LUMBAR FACET ARTHROPATHY: ICD-10-CM

## 2025-04-09 DIAGNOSIS — M47.816 LUMBAR SPONDYLOSIS: ICD-10-CM

## 2025-04-09 DIAGNOSIS — Z98.890 HISTORY OF LUMBAR LAMINECTOMY: ICD-10-CM

## 2025-04-09 PROCEDURE — 1125F AMNT PAIN NOTED PAIN PRSNT: CPT | Performed by: NURSE PRACTITIONER

## 2025-04-09 PROCEDURE — G8427 DOCREV CUR MEDS BY ELIG CLIN: HCPCS | Performed by: NURSE PRACTITIONER

## 2025-04-09 PROCEDURE — 99214 OFFICE O/P EST MOD 30 MIN: CPT | Performed by: NURSE PRACTITIONER

## 2025-04-09 PROCEDURE — G8417 CALC BMI ABV UP PARAM F/U: HCPCS | Performed by: NURSE PRACTITIONER

## 2025-04-09 PROCEDURE — 1123F ACP DISCUSS/DSCN MKR DOCD: CPT | Performed by: NURSE PRACTITIONER

## 2025-04-09 PROCEDURE — 1090F PRES/ABSN URINE INCON ASSESS: CPT | Performed by: NURSE PRACTITIONER

## 2025-04-09 PROCEDURE — 3074F SYST BP LT 130 MM HG: CPT | Performed by: NURSE PRACTITIONER

## 2025-04-09 PROCEDURE — 1159F MED LIST DOCD IN RCRD: CPT | Performed by: NURSE PRACTITIONER

## 2025-04-09 PROCEDURE — 3078F DIAST BP <80 MM HG: CPT | Performed by: NURSE PRACTITIONER

## 2025-04-09 PROCEDURE — 1036F TOBACCO NON-USER: CPT | Performed by: NURSE PRACTITIONER

## 2025-04-09 PROCEDURE — 3017F COLORECTAL CA SCREEN DOC REV: CPT | Performed by: NURSE PRACTITIONER

## 2025-04-09 PROCEDURE — G8399 PT W/DXA RESULTS DOCUMENT: HCPCS | Performed by: NURSE PRACTITIONER

## 2025-04-09 ASSESSMENT — ENCOUNTER SYMPTOMS
COLOR CHANGE: 0
BACK PAIN: 1

## 2025-04-09 NOTE — PROGRESS NOTES
Functionality Assessment/Goals Worksheet     On a scale of 0 (Does not Interfere) to 10 (Completely Interferes)     1.  Which number describes how during the past week pain has interfered with           the following:  A.  General Activity:  5  B.  Mood: 6  C.  Walking Ability:  5  D.  Normal Work (Includes both work outside the home and housework):  6  E.  Relations with Other People:   2  F.  Sleep:   5  G.  Enjoyment of Life:   5    2.  Patient Prefers to Take their Pain Medications:     []  On a regular basis   []  Only when necessary    [x]  Does not take pain medications    3.  What are the Patient's Goals/Expectations for Visiting Pain Management?     []  Learn about my pain    []  Receive Medication   []  Physical Therapy     []  Treat Depression   [x]  Receive Injections    []  Treat Sleep   []  Deal with Anxiety and Stress   []  Treat Opoid Dependence/Addiction   []  Other:

## 2025-04-09 NOTE — PROGRESS NOTES
[facility] [place] [facility]  [facility]  [address]  [place] [address]  Dept: [phone]  Dept Fax: [phone]  Loc: [phone]    Visit Date: 4/9/2025    Functionality Assessment/Goals Worksheet     On a scale of 0 (Does not Interfere) to 10 (Completely Interferes)     1.  Which number describes how during the past week pain has interfered with       the following:  A.  General Activity:  9  B.  Mood: 6  C.  Walking Ability:  9  D.  Normal Work (Includes both work outside the home and housework):  9  E.  Relations with Other People:   5  F.  Sleep:   4  G.  Enjoyment of Life:   8    2.  Patient Prefers to Take their Pain Medications:     []  On a regular basis   [x]  Only when necessary    []  Does not take pain medications    3.  What are the Patient's Goals/Expectations for Visiting Pain Management?     []  Learn about my pain    [x]  Receive Medication   []  Physical Therapy     []  Treat Depression   []  Receive Injections    []  Treat Sleep   []  Deal with Anxiety and Stress   []  Treat Opoid Dependence/Addiction   []  Other:      HPI:   Jesi Rivera is a 74 y.o. female is here today for    Chief Complaint: Low back pain and SI pain      F/U  Bilateral Sacroiliac ADVANCED injection 2/27/2025 was doing well with 80% pain relief or benefit. Until she had foot surgery  now only 50% pain relief or benefit.     She had left heel ostectomy achilles tendon repair with Dr Khoury 3/6/2025 has been sleeping in chair and couch since foot surgery. Here today with  knee cart and  surgical boot on.       She has continued Bilateral SI pain increases with walking standing bending lifting.     She had Lumbar Laminectomy and Fusion L4-5 9/6/2022  with Dr Frances since her last visit.  She states recovery was tough.  The surgery was helping her pain  for awhile. Low lumbar  Bilateral SI pain is coming back more moderate to severe. Not as bad as

## 2025-04-28 ENCOUNTER — OFFICE VISIT (OUTPATIENT)
Dept: FAMILY MEDICINE CLINIC | Age: 75
End: 2025-04-28

## 2025-04-28 VITALS
HEART RATE: 84 BPM | DIASTOLIC BLOOD PRESSURE: 70 MMHG | WEIGHT: 165 LBS | BODY MASS INDEX: 30.36 KG/M2 | RESPIRATION RATE: 16 BRPM | HEIGHT: 62 IN | SYSTOLIC BLOOD PRESSURE: 130 MMHG

## 2025-04-28 DIAGNOSIS — R73.03 PRE-DIABETES: Primary | ICD-10-CM

## 2025-04-28 DIAGNOSIS — R06.09 DOE (DYSPNEA ON EXERTION): ICD-10-CM

## 2025-04-28 LAB
CHP ED QC CHECK: ABNORMAL
GLUCOSE BLD-MCNC: 131 MG/DL
HBA1C MFR BLD: 5.3 %

## 2025-04-28 PROCEDURE — 82962 GLUCOSE BLOOD TEST: CPT | Performed by: FAMILY MEDICINE

## 2025-04-28 PROCEDURE — 83036 HEMOGLOBIN GLYCOSYLATED A1C: CPT | Performed by: FAMILY MEDICINE

## 2025-04-28 PROCEDURE — 1036F TOBACCO NON-USER: CPT | Performed by: FAMILY MEDICINE

## 2025-04-28 PROCEDURE — G8427 DOCREV CUR MEDS BY ELIG CLIN: HCPCS | Performed by: FAMILY MEDICINE

## 2025-04-28 PROCEDURE — 99214 OFFICE O/P EST MOD 30 MIN: CPT | Performed by: FAMILY MEDICINE

## 2025-04-28 PROCEDURE — 3017F COLORECTAL CA SCREEN DOC REV: CPT | Performed by: FAMILY MEDICINE

## 2025-04-28 PROCEDURE — G8417 CALC BMI ABV UP PARAM F/U: HCPCS | Performed by: FAMILY MEDICINE

## 2025-04-28 PROCEDURE — 1123F ACP DISCUSS/DSCN MKR DOCD: CPT | Performed by: FAMILY MEDICINE

## 2025-04-28 SDOH — ECONOMIC STABILITY: FOOD INSECURITY: WITHIN THE PAST 12 MONTHS, YOU WORRIED THAT YOUR FOOD WOULD RUN OUT BEFORE YOU GOT MONEY TO BUY MORE.: NEVER TRUE

## 2025-04-28 SDOH — ECONOMIC STABILITY: FOOD INSECURITY: WITHIN THE PAST 12 MONTHS, THE FOOD YOU BOUGHT JUST DIDN'T LAST AND YOU DIDN'T HAVE MONEY TO GET MORE.: NEVER TRUE

## 2025-04-28 ASSESSMENT — PATIENT HEALTH QUESTIONNAIRE - PHQ9
4. FEELING TIRED OR HAVING LITTLE ENERGY: NOT AT ALL
SUM OF ALL RESPONSES TO PHQ QUESTIONS 1-9: 1
SUM OF ALL RESPONSES TO PHQ QUESTIONS 1-9: 1
7. TROUBLE CONCENTRATING ON THINGS, SUCH AS READING THE NEWSPAPER OR WATCHING TELEVISION: NOT AT ALL
8. MOVING OR SPEAKING SO SLOWLY THAT OTHER PEOPLE COULD HAVE NOTICED. OR THE OPPOSITE, BEING SO FIGETY OR RESTLESS THAT YOU HAVE BEEN MOVING AROUND A LOT MORE THAN USUAL: NOT AT ALL
SUM OF ALL RESPONSES TO PHQ QUESTIONS 1-9: 1
9. THOUGHTS THAT YOU WOULD BE BETTER OFF DEAD, OR OF HURTING YOURSELF: NOT AT ALL
3. TROUBLE FALLING OR STAYING ASLEEP: NOT AT ALL
SUM OF ALL RESPONSES TO PHQ QUESTIONS 1-9: 1
1. LITTLE INTEREST OR PLEASURE IN DOING THINGS: SEVERAL DAYS
5. POOR APPETITE OR OVEREATING: NOT AT ALL
6. FEELING BAD ABOUT YOURSELF - OR THAT YOU ARE A FAILURE OR HAVE LET YOURSELF OR YOUR FAMILY DOWN: NOT AT ALL
2. FEELING DOWN, DEPRESSED OR HOPELESS: NOT AT ALL
10. IF YOU CHECKED OFF ANY PROBLEMS, HOW DIFFICULT HAVE THESE PROBLEMS MADE IT FOR YOU TO DO YOUR WORK, TAKE CARE OF THINGS AT HOME, OR GET ALONG WITH OTHER PEOPLE: NOT DIFFICULT AT ALL

## 2025-04-28 ASSESSMENT — ENCOUNTER SYMPTOMS
SINUS PRESSURE: 0
SHORTNESS OF BREATH: 1
CONSTIPATION: 0

## 2025-04-28 NOTE — PROGRESS NOTES
Jesi Rivera (:  1950) is a 74 y.o. female,Established patient, here for evaluation of the following chief complaint(s):  Diabetes         Assessment & Plan  Pre-diabetes       Orders:    POCT Glucose    POCT glycosylated hemoglobin (Hb A1C)    TARANGO (dyspnea on exertion)       Orders:    XR CHEST STANDARD (2 VW); Future    Brain Natriuretic Peptide; Future      Get the chest xray and non fasting lab done soon.  See me in 6 months       Subjective   HPI  She had left foot surgery several weeks ago  She had Covid  and a few months later noticed some TARANGO that bothered her.  No orthopnea or PND. No pronounced ankle swelling  The A1c is now out of the pre diabetic range but she's lost about 30 pounds with generic wegovy from a weight loss center  Review of Systems   Constitutional:  Negative for fatigue.   HENT:  Negative for sinus pressure.    Eyes:  Negative for visual disturbance.   Respiratory:  Positive for shortness of breath.    Cardiovascular:  Negative for chest pain.   Gastrointestinal:  Negative for constipation.   Genitourinary: Negative.    Musculoskeletal:  Negative for arthralgias.   Skin:  Negative for rash.   Neurological:  Negative for headaches.      The patient's medications, allergies, past medical problems, surgical, social, and family histories were reviewed and updated as needed.    Objective   Physical Exam  Constitutional:       Appearance: Normal appearance. She is well-developed.   HENT:      Head: Normocephalic and atraumatic.   Eyes:      General: No scleral icterus.     Conjunctiva/sclera: Conjunctivae normal.   Neck:      Trachea: No tracheal deviation.   Cardiovascular:      Rate and Rhythm: Normal rate and regular rhythm.      Heart sounds: Normal heart sounds.   Pulmonary:      Effort: Pulmonary effort is normal.      Breath sounds: Normal breath sounds.   Skin:     General: Skin is warm and dry.   Neurological:      General: No focal deficit present.      Mental

## 2025-05-12 ENCOUNTER — RESULTS FOLLOW-UP (OUTPATIENT)
Dept: FAMILY MEDICINE CLINIC | Age: 75
End: 2025-05-12

## 2025-05-12 LAB — B-TYPE NATRIURETIC PEPTIDE: 105 PG/ML (ref 0–100)

## 2025-05-12 NOTE — TELEPHONE ENCOUNTER
----- Message from Dr. García Miramontes MD sent at 5/12/2025  2:39 PM EDT -----  Let her know that the xray was fine.    Let her know that the blood test was fine.      POCT Glucose; POCT glycosylated hemoglobin (Hb A1C); Brain Natriuretic Peptide

## 2025-05-25 ENCOUNTER — HOSPITAL ENCOUNTER (EMERGENCY)
Age: 75
Discharge: HOME | End: 2025-05-25
Payer: MEDICARE

## 2025-05-25 VITALS
HEIGHT: 62 IN | TEMPERATURE: 98 F | HEART RATE: 65 BPM | WEIGHT: 168 LBS | BODY MASS INDEX: 30.91 KG/M2 | OXYGEN SATURATION: 95 % | DIASTOLIC BLOOD PRESSURE: 76 MMHG | SYSTOLIC BLOOD PRESSURE: 127 MMHG | RESPIRATION RATE: 20 BRPM

## 2025-05-25 DIAGNOSIS — L23.7 POISON IVY DERMATITIS: Primary | ICD-10-CM

## 2025-05-25 PROCEDURE — 6360000002 HC RX W HCPCS: Performed by: NURSE PRACTITIONER

## 2025-05-25 PROCEDURE — 99213 OFFICE O/P EST LOW 20 MIN: CPT

## 2025-05-25 PROCEDURE — 99213 OFFICE O/P EST LOW 20 MIN: CPT | Performed by: NURSE PRACTITIONER

## 2025-05-25 PROCEDURE — 96372 THER/PROPH/DIAG INJ SC/IM: CPT

## 2025-05-25 RX ORDER — METHYLPREDNISOLONE ACETATE 80 MG/ML
80 INJECTION, SUSPENSION INTRA-ARTICULAR; INTRALESIONAL; INTRAMUSCULAR; SOFT TISSUE ONCE
Status: COMPLETED | OUTPATIENT
Start: 2025-05-25 | End: 2025-05-25

## 2025-05-25 RX ORDER — PREDNISONE 10 MG/1
TABLET ORAL
Qty: 30 TABLET | Refills: 0 | Status: SHIPPED | OUTPATIENT
Start: 2025-05-25

## 2025-05-25 RX ADMIN — METHYLPREDNISOLONE ACETATE 80 MG: 80 INJECTION, SUSPENSION INTRA-ARTICULAR; INTRALESIONAL; INTRAMUSCULAR; SOFT TISSUE at 14:41

## 2025-05-25 ASSESSMENT — PAIN - FUNCTIONAL ASSESSMENT: PAIN_FUNCTIONAL_ASSESSMENT: NONE - DENIES PAIN

## 2025-05-25 NOTE — ED PROVIDER NOTES
[facility]  Urgent Care Encounter      CHIEF COMPLAINT       Chief Complaint   Patient presents with    Rash     Possible poison ivy on left side of face started about 2 days ago       Nurses Notes reviewed and I agree except as noted in the HPI.  HISTORY OF PRESENT ILLNESS   Jesi Rivera is a 74 y.o. female who presents for evaluation of poison ivy dermatitis to left side of face.  Onset 2 days ago, worsening.  Possible exposure to poison ivy.  Patient states that she will get poison ivy at least once per year.  Patient has responded well to combination therapy with Depo-Medrol and prednisone in the past.  No improvement with current treatment.    REVIEW OF SYSTEMS     Review of Systems   Constitutional:  Negative for chills, diaphoresis, fatigue and fever.   HENT:  Negative for trouble swallowing.    Respiratory:  Negative for shortness of breath.    Cardiovascular:  Negative for chest pain.   Gastrointestinal:  Negative for nausea.   Musculoskeletal:  Negative for neck pain and neck stiffness.   Skin:  Positive for rash.   Neurological:  Negative for headaches.   Hematological:  Negative for adenopathy.       PAST MEDICAL HISTORY         Diagnosis Date    Adenomatous colon polyp 2012    Chronic gastritis 2013    Coronary artery spasm     GERD (gastroesophageal reflux disease) 2002    Hiatal hernia 01/27/2019    Hyperlipidemia     Hypertension 2001    MVP (mitral valve prolapse)     Obesity, Class II, BMI 35-39.9, with comorbidity 2012    Osteopenia 10/2012    Pre-diabetes 05/18/2023    Scoliosis (and kyphoscoliosis), idiopathic     Vitamin D deficiency 2012       SURGICAL HISTORY     Patient  has a past surgical history that includes Carpal tunnel release (Right, 2007); Hand surgery (Left, 2008); Upper gastrointestinal endoscopy (10/2013); Cardiac catheterization (2008); Cardiac catheterization (12/18/2018); Endoscopy, colon, diagnostic (01/27/2019); Nerve Block Lumb Facet Level 1 Bilateral

## 2025-06-25 ENCOUNTER — OFFICE VISIT (OUTPATIENT)
Dept: PHYSICAL MEDICINE AND REHAB | Age: 75
End: 2025-06-25
Payer: MEDICARE

## 2025-06-25 ENCOUNTER — TELEPHONE (OUTPATIENT)
Dept: PHYSICAL MEDICINE AND REHAB | Age: 75
End: 2025-06-25

## 2025-06-25 VITALS
DIASTOLIC BLOOD PRESSURE: 64 MMHG | SYSTOLIC BLOOD PRESSURE: 118 MMHG | WEIGHT: 170 LBS | BODY MASS INDEX: 31.28 KG/M2 | HEIGHT: 62 IN

## 2025-06-25 DIAGNOSIS — Z98.890 HISTORY OF LUMBAR LAMINECTOMY: ICD-10-CM

## 2025-06-25 DIAGNOSIS — M53.3 SACROILIAC JOINT DYSFUNCTION: ICD-10-CM

## 2025-06-25 DIAGNOSIS — M46.1 SI (SACROILIAC) JOINT INFLAMMATION: Primary | ICD-10-CM

## 2025-06-25 DIAGNOSIS — M47.816 LUMBAR SPONDYLOSIS: ICD-10-CM

## 2025-06-25 DIAGNOSIS — Z98.1 HISTORY OF LUMBAR FUSION: ICD-10-CM

## 2025-06-25 DIAGNOSIS — M47.816 LUMBAR FACET ARTHROPATHY: ICD-10-CM

## 2025-06-25 DIAGNOSIS — M54.17 LUMBOSACRAL RADICULITIS: ICD-10-CM

## 2025-06-25 PROCEDURE — G8417 CALC BMI ABV UP PARAM F/U: HCPCS | Performed by: NURSE PRACTITIONER

## 2025-06-25 PROCEDURE — 3074F SYST BP LT 130 MM HG: CPT | Performed by: NURSE PRACTITIONER

## 2025-06-25 PROCEDURE — 1036F TOBACCO NON-USER: CPT | Performed by: NURSE PRACTITIONER

## 2025-06-25 PROCEDURE — G8427 DOCREV CUR MEDS BY ELIG CLIN: HCPCS | Performed by: NURSE PRACTITIONER

## 2025-06-25 PROCEDURE — 1090F PRES/ABSN URINE INCON ASSESS: CPT | Performed by: NURSE PRACTITIONER

## 2025-06-25 PROCEDURE — 3078F DIAST BP <80 MM HG: CPT | Performed by: NURSE PRACTITIONER

## 2025-06-25 PROCEDURE — G8399 PT W/DXA RESULTS DOCUMENT: HCPCS | Performed by: NURSE PRACTITIONER

## 2025-06-25 PROCEDURE — 99214 OFFICE O/P EST MOD 30 MIN: CPT | Performed by: NURSE PRACTITIONER

## 2025-06-25 PROCEDURE — 3017F COLORECTAL CA SCREEN DOC REV: CPT | Performed by: NURSE PRACTITIONER

## 2025-06-25 PROCEDURE — 1125F AMNT PAIN NOTED PAIN PRSNT: CPT | Performed by: NURSE PRACTITIONER

## 2025-06-25 PROCEDURE — 1159F MED LIST DOCD IN RCRD: CPT | Performed by: NURSE PRACTITIONER

## 2025-06-25 PROCEDURE — 1123F ACP DISCUSS/DSCN MKR DOCD: CPT | Performed by: NURSE PRACTITIONER

## 2025-06-25 ASSESSMENT — ENCOUNTER SYMPTOMS
COLOR CHANGE: 0
BACK PAIN: 1

## 2025-06-25 NOTE — PROGRESS NOTES
Functionality Assessment/Goals Worksheet     On a scale of 0 (Does not Interfere) to 10 (Completely Interferes)     1.  Which number describes how during the past week pain has interfered with           the following:  A.  General Activity:  5  B.  Mood: 5  C.  Walking Ability:  5  D.  Normal Work (Includes both work outside the home and housework):  6  E.  Relations with Other People:   4  F.  Sleep:   5  G.  Enjoyment of Life:   7    2.  Patient Prefers to Take their Pain Medications:     []  On a regular basis   []  Only when necessary    []  Does not take pain medications    3.  What are the Patient's Goals/Expectations for Visiting Pain Management?     []  Learn about my pain    []  Receive Medication   []  Physical Therapy     []  Treat Depression   [x]  Receive Injections    []  Treat Sleep   []  Deal with Anxiety and Stress   []  Treat Opoid Dependence/Addiction   []  Other:   HPI:   Jesi Rivera is a 74 y.o. female is here today for    Chief Complaint: Low back pain and SI pain      F/U   Has Poison Ama was in ER 5/25/2025 .      She has continued Bilateral SI pain increases with walking standing bending lifting. Would like to repeat SI injections now that her foot is healing.        She had left heel ostectomy achilles tendon repair with Dr Khoury 3/6/2025 has been sleeping in chair and couch since foot surgery.  She is doing well still has some numbness in her heel.       She had Lumbar Laminectomy and Fusion L4-5 9/6/2022  with Dr Frances since her last visit.  She states recovery was tough.  The surgery was helping her pain  for awhile. Low lumbar  Bilateral SI pain is coming back more moderate to severe. Not as bad as it was prior to lumbar surgery but getting close at times.  She has constant low back SI pain except at rest.  She is unable to tolerate  gardening sweeping shopping walking standing  yard work housework  for any long periods of time. She doesn't have much pain while sitting or laying.

## 2025-06-25 NOTE — TELEPHONE ENCOUNTER
Pt denies taking any blood thinners except ASA 81 mg. (no cardiac events in the last 6 months) prior to scheduling procedure.

## 2025-08-07 ENCOUNTER — TELEPHONE (OUTPATIENT)
Dept: FAMILY MEDICINE CLINIC | Age: 75
End: 2025-08-07

## 2025-08-11 ENCOUNTER — HOSPITAL ENCOUNTER (OUTPATIENT)
Age: 75
Setting detail: OUTPATIENT SURGERY
Discharge: HOME | End: 2025-08-11
Attending: PAIN MEDICINE | Admitting: PAIN MEDICINE
Payer: MEDICARE

## 2025-08-11 ENCOUNTER — APPOINTMENT (OUTPATIENT)
Dept: GENERAL RADIOLOGY | Age: 75
End: 2025-08-11
Attending: PAIN MEDICINE
Payer: MEDICARE

## 2025-08-11 VITALS
BODY MASS INDEX: 29.07 KG/M2 | HEART RATE: 63 BPM | DIASTOLIC BLOOD PRESSURE: 59 MMHG | TEMPERATURE: 97.5 F | SYSTOLIC BLOOD PRESSURE: 115 MMHG | HEIGHT: 61 IN | WEIGHT: 154 LBS | RESPIRATION RATE: 17 BRPM | OXYGEN SATURATION: 93 %

## 2025-08-11 PROBLEM — M53.3 SACROILIAC JOINT DYSFUNCTION: Status: ACTIVE | Noted: 2025-08-11

## 2025-08-11 PROCEDURE — 3600000054 HC PAIN LEVEL 3 BASE: Performed by: PAIN MEDICINE

## 2025-08-11 PROCEDURE — 64451 NJX AA&/STRD NRV NRVTG SI JT: CPT | Performed by: PAIN MEDICINE

## 2025-08-11 PROCEDURE — 99152 MOD SED SAME PHYS/QHP 5/>YRS: CPT | Performed by: PAIN MEDICINE

## 2025-08-11 PROCEDURE — 2500000003 HC RX 250 WO HCPCS: Performed by: PAIN MEDICINE

## 2025-08-11 PROCEDURE — 7100000011 HC PHASE II RECOVERY - ADDTL 15 MIN: Performed by: PAIN MEDICINE

## 2025-08-11 PROCEDURE — 2709999900 HC NON-CHARGEABLE SUPPLY: Performed by: PAIN MEDICINE

## 2025-08-11 PROCEDURE — 7100000010 HC PHASE II RECOVERY - FIRST 15 MIN: Performed by: PAIN MEDICINE

## 2025-08-11 PROCEDURE — 6360000002 HC RX W HCPCS: Performed by: PAIN MEDICINE

## 2025-08-11 RX ORDER — FENTANYL CITRATE 50 UG/ML
INJECTION, SOLUTION INTRAMUSCULAR; INTRAVENOUS PRN
Status: DISCONTINUED | OUTPATIENT
Start: 2025-08-11 | End: 2025-08-11 | Stop reason: ALTCHOICE

## 2025-08-11 RX ORDER — LIDOCAINE HYDROCHLORIDE 20 MG/ML
INJECTION, SOLUTION INFILTRATION; PERINEURAL PRN
Status: DISCONTINUED | OUTPATIENT
Start: 2025-08-11 | End: 2025-08-11 | Stop reason: ALTCHOICE

## 2025-08-11 RX ORDER — METHYLPREDNISOLONE ACETATE 80 MG/ML
INJECTION, SUSPENSION INTRA-ARTICULAR; INTRALESIONAL; INTRAMUSCULAR; SOFT TISSUE PRN
Status: DISCONTINUED | OUTPATIENT
Start: 2025-08-11 | End: 2025-08-11 | Stop reason: ALTCHOICE

## 2025-08-11 RX ORDER — BUPIVACAINE HYDROCHLORIDE AND EPINEPHRINE 5; 5 MG/ML; UG/ML
INJECTION, SOLUTION EPIDURAL; INTRACAUDAL; PERINEURAL PRN
Status: DISCONTINUED | OUTPATIENT
Start: 2025-08-11 | End: 2025-08-11 | Stop reason: ALTCHOICE

## 2025-08-11 RX ORDER — MIDAZOLAM HYDROCHLORIDE 1 MG/ML
INJECTION, SOLUTION INTRAMUSCULAR; INTRAVENOUS PRN
Status: DISCONTINUED | OUTPATIENT
Start: 2025-08-11 | End: 2025-08-11 | Stop reason: ALTCHOICE

## 2025-08-11 ASSESSMENT — PAIN - FUNCTIONAL ASSESSMENT
PAIN_FUNCTIONAL_ASSESSMENT: 0-10
PAIN_FUNCTIONAL_ASSESSMENT: 0-10

## 2025-09-01 DIAGNOSIS — F33.41 RECURRENT MAJOR DEPRESSIVE DISORDER IN PARTIAL REMISSION: ICD-10-CM

## 2025-09-03 ENCOUNTER — OFFICE VISIT (OUTPATIENT)
Dept: PHYSICAL MEDICINE AND REHAB | Age: 75
End: 2025-09-03
Payer: MEDICARE

## 2025-09-03 VITALS
DIASTOLIC BLOOD PRESSURE: 60 MMHG | SYSTOLIC BLOOD PRESSURE: 120 MMHG | WEIGHT: 155 LBS | HEIGHT: 61 IN | BODY MASS INDEX: 29.27 KG/M2

## 2025-09-03 DIAGNOSIS — M54.17 LUMBOSACRAL RADICULITIS: ICD-10-CM

## 2025-09-03 DIAGNOSIS — M47.816 LUMBAR FACET ARTHROPATHY: ICD-10-CM

## 2025-09-03 DIAGNOSIS — Z98.1 HISTORY OF LUMBAR FUSION: ICD-10-CM

## 2025-09-03 DIAGNOSIS — Z98.890 HISTORY OF LUMBAR LAMINECTOMY: ICD-10-CM

## 2025-09-03 DIAGNOSIS — M47.816 LUMBAR SPONDYLOSIS: ICD-10-CM

## 2025-09-03 DIAGNOSIS — M53.3 SACROILIAC JOINT DYSFUNCTION: ICD-10-CM

## 2025-09-03 DIAGNOSIS — M46.1 SI (SACROILIAC) JOINT INFLAMMATION: Primary | ICD-10-CM

## 2025-09-03 PROCEDURE — 3074F SYST BP LT 130 MM HG: CPT | Performed by: NURSE PRACTITIONER

## 2025-09-03 PROCEDURE — 99213 OFFICE O/P EST LOW 20 MIN: CPT | Performed by: NURSE PRACTITIONER

## 2025-09-03 PROCEDURE — 3078F DIAST BP <80 MM HG: CPT | Performed by: NURSE PRACTITIONER

## 2025-09-03 PROCEDURE — 1125F AMNT PAIN NOTED PAIN PRSNT: CPT | Performed by: NURSE PRACTITIONER

## 2025-09-03 PROCEDURE — 1036F TOBACCO NON-USER: CPT | Performed by: NURSE PRACTITIONER

## 2025-09-03 PROCEDURE — 1123F ACP DISCUSS/DSCN MKR DOCD: CPT | Performed by: NURSE PRACTITIONER

## 2025-09-03 PROCEDURE — 1159F MED LIST DOCD IN RCRD: CPT | Performed by: NURSE PRACTITIONER

## 2025-09-03 PROCEDURE — 3017F COLORECTAL CA SCREEN DOC REV: CPT | Performed by: NURSE PRACTITIONER

## 2025-09-03 PROCEDURE — G8417 CALC BMI ABV UP PARAM F/U: HCPCS | Performed by: NURSE PRACTITIONER

## 2025-09-03 PROCEDURE — G8399 PT W/DXA RESULTS DOCUMENT: HCPCS | Performed by: NURSE PRACTITIONER

## 2025-09-03 PROCEDURE — G8427 DOCREV CUR MEDS BY ELIG CLIN: HCPCS | Performed by: NURSE PRACTITIONER

## 2025-09-03 PROCEDURE — 1090F PRES/ABSN URINE INCON ASSESS: CPT | Performed by: NURSE PRACTITIONER

## 2025-09-03 ASSESSMENT — ENCOUNTER SYMPTOMS
COLOR CHANGE: 0
BACK PAIN: 1

## (undated) DEVICE — SYRINGE MEDICAL 3ML CLEAR PLASTIC STANDARD NON CONTROL LUERLOCK TIP DISPOSABLE

## (undated) DEVICE — HYPODERMIC SAFETY NEEDLE: Brand: MAGELLAN

## (undated) DEVICE — SHEET,DRAPE,3/4,53X77,STERILE: Brand: MEDLINE

## (undated) DEVICE — NEEDLE SPNL 22GA L3.5IN BLK HUB S STL REG WALL FIT STYL W/

## (undated) DEVICE — 6 ML SYRINGE LUER-LOCK TIP: Brand: MONOJECT

## (undated) DEVICE — GLOVE ORANGE PI 7 1/2   MSG9075

## (undated) DEVICE — NEEDLE SPNL 22GA L7IN SPINOCAN

## (undated) DEVICE — CHLORAPREP 26ML CLEAR

## (undated) DEVICE — SYRINGE MED 3ML TRNSLUC BRL POLYPR GEN PURP W/ LUERLOCK TIP

## (undated) DEVICE — TOWEL,OR,DSP,ST,BLUE,STD,4/PK,20PK/CS: Brand: MEDLINE

## (undated) DEVICE — NEEDLE SYR 18GA L1.5IN RED PLAS HUB S STL BLNT FILL W/O

## (undated) DEVICE — 3 ML SYRINGE LUER-LOCK TIP: Brand: MONOJECT

## (undated) DEVICE — GLOVE SURG SZ 8 L11.77IN FNGR THK9.8MIL STRW LTX POLYMER

## (undated) DEVICE — Device

## (undated) DEVICE — GAUZE,SPONGE,4"X4",12PLY,STERILE,LF,2'S: Brand: MEDLINE

## (undated) DEVICE — BANDAGE ADH W1XL3IN NAT FAB WVN FLX DURABLE N ADH PD SEAL

## (undated) DEVICE — SYRINGE MED 5ML STD CLR PLAS LUERLOCK TIP N CTRL DISP

## (undated) DEVICE — NEEDLE SPNL 22 GAX5 IN HUB QNCKE FUNNEL SHP STYL BLK SPINCAN

## (undated) DEVICE — Z DISCONTINUED USE 2272117 DRAPE SURG 3 QTR N INVASIVE 2 LAYR DISP

## (undated) DEVICE — SYRINGE MED 10ML LUERLOCK TIP W/O SFTY DISP

## (undated) DEVICE — DRAPE SURG 77X53 IN SHT 3 QTR RVS FLD POLYPR DISP

## (undated) DEVICE — NEEDLE SPNL 22GA L7IN BLK HUB S STL W/ QNCKE PNT W/OUT

## (undated) DEVICE — DRESSING ALG W3XL4IN TRNSPAR ANTIMIC WND CNTCT LAYR W/

## (undated) DEVICE — INTENDED FOR TISSUE SEPARATION, AND OTHER PROCEDURES THAT REQUIRE A SHARP SURGICAL BLADE TO PUNCTURE OR CUT.: Brand: BARD-PARKER ® CARBON RIB-BACK BLADES

## (undated) DEVICE — GLOVE SURG SZ 65 THK91MIL LTX FREE SYN POLYISOPRENE

## (undated) DEVICE — GLOVE ORANGE PI 8   MSG9080

## (undated) DEVICE — NEEDLE SPNL 22GA L3.5IN BLK HUB S STL REG WALL FIT STYL

## (undated) DEVICE — SC PAIN PACK: Brand: MEDLINE INDUSTRIES, INC.

## (undated) DEVICE — NEEDLE HYPO 25GA L1.5IN BVL ORIENTED ECLIPSE

## (undated) DEVICE — Z DISCONTINUED USE 2537982 ELECTRODE DISPER W/ CRD DISP

## (undated) DEVICE — CANNULA RF 20 GAUGEX100MM 10MM

## (undated) DEVICE — LABEL MED DRUG CUST

## (undated) DEVICE — PAD GRND FOR RF PAIN MGMT DISP

## (undated) DEVICE — GLOVE ORANGE PI 8 1/2   MSG9085

## (undated) DEVICE — SHEET,DRAPE,53X77,STERILE: Brand: MEDLINE

## (undated) DEVICE — TOWEL,OR,DSP,ST,BLUE,DLX,4/PK,20PK/CS: Brand: MEDLINE

## (undated) DEVICE — BIPOLAR  ELECTRODE, 2MM, STERILE: Brand: N.A.

## (undated) DEVICE — APPLICATOR MEDICATED 26 CC SOLUTION HI LT ORNG CHLORAPREP

## (undated) DEVICE — ZINACTIVE USE 2537982 PAD GRND FOR RF PAIN MGMT DISP

## (undated) DEVICE — NEEDLE SYRINGE 18GA L1.5IN RED PLAS HUB S STL BLNT FILL W/O

## (undated) DEVICE — GLOVE ORANGE PI 7   MSG9070

## (undated) DEVICE — GAUZE SPONGES,USP TYPE VII GAUZE, 12 PLY: Brand: CURITY

## (undated) DEVICE — AURIX XL IS A PHLEBOTOMY CONVENIENCE KIT: Brand: AURIX XL

## (undated) DEVICE — MARKER SURG SKIN GENTIAN VLT REG TIP W/ 6IN RUL AND BLNK DYNJSM02

## (undated) DEVICE — SYRINGE MED 6ML STD POLYPR REG TIP ULT SHRP TRIBEVELED ANTI

## (undated) DEVICE — SYRINGE MED 30ML SLIP TIP BLNT FILL AND LUERLOCK DISP

## (undated) DEVICE — PENCIL SMK EVAC ALL IN 1 DSGN ENH VISIBILITY IMPROVED AIR